# Patient Record
Sex: FEMALE | Race: BLACK OR AFRICAN AMERICAN | Employment: OTHER | URBAN - NONMETROPOLITAN AREA
[De-identification: names, ages, dates, MRNs, and addresses within clinical notes are randomized per-mention and may not be internally consistent; named-entity substitution may affect disease eponyms.]

---

## 2017-05-02 ENCOUNTER — HOSPITAL ENCOUNTER (INPATIENT)
Age: 42
LOS: 1 days | Discharge: PSYCHIATRIC OR SUBSTANCE USE FACILITY | DRG: 885 | End: 2017-05-03
Attending: EMERGENCY MEDICINE | Admitting: PSYCHIATRY & NEUROLOGY
Payer: MEDICAID

## 2017-05-02 DIAGNOSIS — D64.9 ANEMIA, UNSPECIFIED TYPE: ICD-10-CM

## 2017-05-02 DIAGNOSIS — F20.9 SCHIZOPHRENIA, UNSPECIFIED TYPE (HCC): Primary | ICD-10-CM

## 2017-05-02 DIAGNOSIS — E61.1 IRON DEFICIENCY: ICD-10-CM

## 2017-05-02 LAB
ALBUMIN SERPL-MCNC: 4.2 G/DL (ref 3.5–5.2)
ALP BLD-CCNC: 72 U/L (ref 35–104)
ALT SERPL-CCNC: 7 U/L (ref 5–33)
AMPHETAMINE SCREEN, URINE: NEGATIVE
ANION GAP SERPL CALCULATED.3IONS-SCNC: 13 MMOL/L (ref 7–19)
AST SERPL-CCNC: 8 U/L (ref 5–32)
BARBITURATE SCREEN URINE: NEGATIVE
BASOPHILS ABSOLUTE: 0 K/UL (ref 0–0.2)
BASOPHILS RELATIVE PERCENT: 0.3 % (ref 0–1)
BENZODIAZEPINE SCREEN, URINE: NEGATIVE
BILIRUB SERPL-MCNC: <0.2 MG/DL (ref 0.2–1.2)
BILIRUBIN URINE: NEGATIVE
BLOOD, URINE: NEGATIVE
BUN BLDV-MCNC: 6 MG/DL (ref 6–20)
CALCIUM SERPL-MCNC: 8.9 MG/DL (ref 8.6–10)
CANNABINOID SCREEN URINE: NEGATIVE
CHLORIDE BLD-SCNC: 101 MMOL/L (ref 98–111)
CLARITY: CLEAR
CO2: 22 MMOL/L (ref 22–29)
COCAINE METABOLITE SCREEN URINE: NEGATIVE
COLOR: YELLOW
CREAT SERPL-MCNC: 0.5 MG/DL (ref 0.5–0.9)
EOSINOPHILS ABSOLUTE: 0 K/UL (ref 0–0.6)
EOSINOPHILS RELATIVE PERCENT: 0.2 % (ref 0–5)
ETHANOL: <10 MG/DL (ref 0–0.08)
FERRITIN: 7.1 NG/ML (ref 13–150)
GFR NON-AFRICAN AMERICAN: >60
GLOBULIN: 3 G/DL
GLUCOSE BLD-MCNC: 87 MG/DL (ref 74–109)
GLUCOSE URINE: NEGATIVE MG/DL
HCG(URINE) PREGNANCY TEST: NEGATIVE
HCT VFR BLD CALC: 29.3 % (ref 37–47)
HEMOGLOBIN: 8.6 G/DL (ref 12–16)
IRON SATURATION: 6 % (ref 14–50)
IRON: 25 UG/DL (ref 37–145)
KETONES, URINE: NEGATIVE MG/DL
LEUKOCYTE ESTERASE, URINE: NEGATIVE
LYMPHOCYTES ABSOLUTE: 1.7 K/UL (ref 1.1–4.5)
LYMPHOCYTES RELATIVE PERCENT: 19.3 % (ref 20–40)
Lab: NORMAL
MCH RBC QN AUTO: 19.2 PG (ref 27–31)
MCHC RBC AUTO-ENTMCNC: 29.4 G/DL (ref 33–37)
MCV RBC AUTO: 65.5 FL (ref 81–99)
MONOCYTES ABSOLUTE: 0.8 K/UL (ref 0–0.9)
MONOCYTES RELATIVE PERCENT: 8.9 % (ref 0–10)
NEUTROPHILS ABSOLUTE: 6.3 K/UL (ref 1.5–7.5)
NEUTROPHILS RELATIVE PERCENT: 70.4 % (ref 50–65)
NITRITE, URINE: NEGATIVE
OPIATE SCREEN URINE: NEGATIVE
PDW BLD-RTO: 21.4 % (ref 11.5–14.5)
PH UA: 7.5
PLATELET # BLD: 363 K/UL (ref 130–400)
PMV BLD AUTO: 10.2 FL (ref 7.4–10.4)
POTASSIUM SERPL-SCNC: 3.6 MMOL/L (ref 3.5–5)
PROTEIN UA: NEGATIVE MG/DL
RBC # BLD: 4.47 M/UL (ref 4.2–5.4)
SODIUM BLD-SCNC: 136 MMOL/L (ref 136–145)
SPECIFIC GRAVITY UA: 1
TOTAL IRON BINDING CAPACITY: 397 UG/DL (ref 250–400)
TOTAL PROTEIN: 7.2 G/DL (ref 6.6–8.7)
UROBILINOGEN, URINE: 0.2 E.U./DL
WBC # BLD: 9 K/UL (ref 4.8–10.8)

## 2017-05-02 PROCEDURE — 82728 ASSAY OF FERRITIN: CPT

## 2017-05-02 PROCEDURE — 6370000000 HC RX 637 (ALT 250 FOR IP): Performed by: PSYCHIATRY & NEUROLOGY

## 2017-05-02 PROCEDURE — 99285 EMERGENCY DEPT VISIT HI MDM: CPT

## 2017-05-02 PROCEDURE — 80053 COMPREHEN METABOLIC PANEL: CPT

## 2017-05-02 PROCEDURE — G0480 DRUG TEST DEF 1-7 CLASSES: HCPCS

## 2017-05-02 PROCEDURE — 83540 ASSAY OF IRON: CPT

## 2017-05-02 PROCEDURE — 36415 COLL VENOUS BLD VENIPUNCTURE: CPT

## 2017-05-02 PROCEDURE — 81025 URINE PREGNANCY TEST: CPT

## 2017-05-02 PROCEDURE — 99284 EMERGENCY DEPT VISIT MOD MDM: CPT | Performed by: EMERGENCY MEDICINE

## 2017-05-02 PROCEDURE — 80307 DRUG TEST PRSMV CHEM ANLYZR: CPT

## 2017-05-02 PROCEDURE — 81003 URINALYSIS AUTO W/O SCOPE: CPT

## 2017-05-02 PROCEDURE — 1240000000 HC EMOTIONAL WELLNESS R&B

## 2017-05-02 PROCEDURE — 85025 COMPLETE CBC W/AUTO DIFF WBC: CPT

## 2017-05-02 PROCEDURE — 83550 IRON BINDING TEST: CPT

## 2017-05-02 PROCEDURE — 6370000000 HC RX 637 (ALT 250 FOR IP): Performed by: EMERGENCY MEDICINE

## 2017-05-02 RX ORDER — CITALOPRAM 10 MG/1
10 TABLET ORAL DAILY
Status: DISCONTINUED | OUTPATIENT
Start: 2017-05-02 | End: 2017-05-02

## 2017-05-02 RX ORDER — BENZTROPINE MESYLATE 1 MG/1
1 TABLET ORAL 2 TIMES DAILY
Status: DISCONTINUED | OUTPATIENT
Start: 2017-05-02 | End: 2017-05-04 | Stop reason: HOSPADM

## 2017-05-02 RX ORDER — PALIPERIDONE 6 MG/1
6 TABLET, EXTENDED RELEASE ORAL DAILY
Status: DISCONTINUED | OUTPATIENT
Start: 2017-05-02 | End: 2017-05-02

## 2017-05-02 RX ORDER — NICOTINE 21 MG/24HR
1 PATCH, TRANSDERMAL 24 HOURS TRANSDERMAL DAILY
Status: DISCONTINUED | OUTPATIENT
Start: 2017-05-03 | End: 2017-05-04 | Stop reason: HOSPADM

## 2017-05-02 RX ORDER — PALIPERIDONE 6 MG/1
6 TABLET, EXTENDED RELEASE ORAL EVERY MORNING
Status: ON HOLD | COMMUNITY
End: 2017-05-03 | Stop reason: HOSPADM

## 2017-05-02 RX ORDER — PALIPERIDONE 6 MG/1
6 TABLET, EXTENDED RELEASE ORAL DAILY
Status: DISCONTINUED | OUTPATIENT
Start: 2017-05-03 | End: 2017-05-03

## 2017-05-02 RX ORDER — CITALOPRAM 10 MG/1
10 TABLET ORAL DAILY
Status: DISCONTINUED | OUTPATIENT
Start: 2017-05-03 | End: 2017-05-04 | Stop reason: HOSPADM

## 2017-05-02 RX ORDER — QUETIAPINE FUMARATE 400 MG/1
400 TABLET, FILM COATED ORAL NIGHTLY
Status: DISCONTINUED | OUTPATIENT
Start: 2017-05-02 | End: 2017-05-03

## 2017-05-02 RX ORDER — NICOTINE 21 MG/24HR
1 PATCH, TRANSDERMAL 24 HOURS TRANSDERMAL DAILY
Status: DISCONTINUED | OUTPATIENT
Start: 2017-05-02 | End: 2017-05-02

## 2017-05-02 RX ORDER — OLANZAPINE 10 MG/1
10 TABLET, ORALLY DISINTEGRATING ORAL ONCE
Status: COMPLETED | OUTPATIENT
Start: 2017-05-02 | End: 2017-05-02

## 2017-05-02 RX ORDER — TRAZODONE HYDROCHLORIDE 50 MG/1
50 TABLET ORAL NIGHTLY PRN
Status: DISCONTINUED | OUTPATIENT
Start: 2017-05-02 | End: 2017-05-03

## 2017-05-02 RX ORDER — ACETAMINOPHEN 325 MG/1
650 TABLET ORAL EVERY 4 HOURS PRN
Status: DISCONTINUED | OUTPATIENT
Start: 2017-05-02 | End: 2017-05-04 | Stop reason: HOSPADM

## 2017-05-02 RX ADMIN — BENZTROPINE MESYLATE 1 MG: 1 TABLET ORAL at 21:08

## 2017-05-02 RX ADMIN — OLANZAPINE 10 MG: 10 TABLET, ORALLY DISINTEGRATING ORAL at 16:35

## 2017-05-02 RX ADMIN — QUETIAPINE FUMARATE 400 MG: 400 TABLET, FILM COATED ORAL at 21:08

## 2017-05-02 RX ADMIN — TRAZODONE HYDROCHLORIDE 50 MG: 50 TABLET ORAL at 21:08

## 2017-05-02 ASSESSMENT — SLEEP AND FATIGUE QUESTIONNAIRES
AVERAGE NUMBER OF SLEEP HOURS: 20
DO YOU HAVE DIFFICULTY SLEEPING: NO
DO YOU USE A SLEEP AID: NO

## 2017-05-02 ASSESSMENT — LIFESTYLE VARIABLES: HISTORY_ALCOHOL_USE: NO

## 2017-05-02 ASSESSMENT — PATIENT HEALTH QUESTIONNAIRE - PHQ9: SUM OF ALL RESPONSES TO PHQ QUESTIONS 1-9: 4

## 2017-05-03 VITALS
OXYGEN SATURATION: 98 % | WEIGHT: 175 LBS | SYSTOLIC BLOOD PRESSURE: 126 MMHG | HEART RATE: 93 BPM | DIASTOLIC BLOOD PRESSURE: 63 MMHG | TEMPERATURE: 98.4 F | RESPIRATION RATE: 20 BRPM | HEIGHT: 59 IN | BODY MASS INDEX: 35.28 KG/M2

## 2017-05-03 PROCEDURE — 6370000000 HC RX 637 (ALT 250 FOR IP): Performed by: PSYCHIATRY & NEUROLOGY

## 2017-05-03 PROCEDURE — 93005 ELECTROCARDIOGRAM TRACING: CPT

## 2017-05-03 PROCEDURE — 90791 PSYCH DIAGNOSTIC EVALUATION: CPT | Performed by: PSYCHIATRY & NEUROLOGY

## 2017-05-03 RX ORDER — TRAZODONE HYDROCHLORIDE 100 MG/1
100 TABLET ORAL NIGHTLY PRN
Status: DISCONTINUED | OUTPATIENT
Start: 2017-05-03 | End: 2017-05-04 | Stop reason: HOSPADM

## 2017-05-03 RX ORDER — OLANZAPINE 10 MG/1
10 TABLET, ORALLY DISINTEGRATING ORAL ONCE
Status: COMPLETED | OUTPATIENT
Start: 2017-05-03 | End: 2017-05-03

## 2017-05-03 RX ORDER — OLANZAPINE 5 MG/1
5 TABLET ORAL 3 TIMES DAILY
Status: DISCONTINUED | OUTPATIENT
Start: 2017-05-03 | End: 2017-05-04 | Stop reason: HOSPADM

## 2017-05-03 RX ORDER — LORAZEPAM 1 MG/1
2 TABLET ORAL 4 TIMES DAILY
Status: DISCONTINUED | OUTPATIENT
Start: 2017-05-03 | End: 2017-05-04 | Stop reason: HOSPADM

## 2017-05-03 RX ADMIN — BENZTROPINE MESYLATE 1 MG: 1 TABLET ORAL at 21:00

## 2017-05-03 RX ADMIN — CITALOPRAM HYDROBROMIDE 10 MG: 10 TABLET ORAL at 08:57

## 2017-05-03 RX ADMIN — BENZTROPINE MESYLATE 1 MG: 1 TABLET ORAL at 08:57

## 2017-05-03 RX ADMIN — LORAZEPAM 2 MG: 1 TABLET ORAL at 17:10

## 2017-05-03 RX ADMIN — OLANZAPINE 5 MG: 5 TABLET, FILM COATED ORAL at 21:00

## 2017-05-03 RX ADMIN — OLANZAPINE 10 MG: 10 TABLET, ORALLY DISINTEGRATING ORAL at 18:02

## 2017-05-03 RX ADMIN — OLANZAPINE 5 MG: 5 TABLET, FILM COATED ORAL at 14:48

## 2017-05-03 RX ADMIN — LORAZEPAM 2 MG: 1 TABLET ORAL at 21:00

## 2017-05-03 RX ADMIN — PALIPERIDONE 6 MG: 6 TABLET, EXTENDED RELEASE ORAL at 08:57

## 2017-05-03 RX ADMIN — OLANZAPINE 10 MG: 10 TABLET, ORALLY DISINTEGRATING ORAL at 17:40

## 2017-05-05 LAB
EKG P AXIS: 73 DEGREES
EKG P-R INTERVAL: 152 MS
EKG Q-T INTERVAL: 348 MS
EKG QRS DURATION: 82 MS
EKG QTC CALCULATION (BAZETT): 398 MS
EKG T AXIS: 39 DEGREES

## 2017-08-08 ENCOUNTER — OFFICE VISIT (OUTPATIENT)
Dept: PRIMARY CARE CLINIC | Age: 42
End: 2017-08-08
Payer: MEDICAID

## 2017-08-08 VITALS
BODY MASS INDEX: 33.47 KG/M2 | OXYGEN SATURATION: 99 % | HEART RATE: 86 BPM | WEIGHT: 166 LBS | SYSTOLIC BLOOD PRESSURE: 120 MMHG | TEMPERATURE: 98 F | DIASTOLIC BLOOD PRESSURE: 80 MMHG | HEIGHT: 59 IN

## 2017-08-08 DIAGNOSIS — E61.1 IRON DEFICIENCY: ICD-10-CM

## 2017-08-08 DIAGNOSIS — E55.9 VITAMIN D DEFICIENCY: ICD-10-CM

## 2017-08-08 DIAGNOSIS — E53.9 VITAMIN B DEFICIENCY: ICD-10-CM

## 2017-08-08 DIAGNOSIS — Z71.6 TOBACCO ABUSE COUNSELING: ICD-10-CM

## 2017-08-08 DIAGNOSIS — Z72.0 TOBACCO ABUSE: ICD-10-CM

## 2017-08-08 DIAGNOSIS — F20.89 OTHER SCHIZOPHRENIA (HCC): Primary | ICD-10-CM

## 2017-08-08 LAB
HCT VFR BLD CALC: 43.5 % (ref 37–47)
HEMOGLOBIN: 13.4 G/DL (ref 12–16)
MCH RBC QN AUTO: 27.4 PG (ref 27–31)
MCHC RBC AUTO-ENTMCNC: 30.8 G/DL (ref 33–37)
MCV RBC AUTO: 89 FL (ref 81–99)
PDW BLD-RTO: 25.2 % (ref 11.5–14.5)
PLATELET # BLD: 249 K/UL (ref 130–400)
PMV BLD AUTO: 11.1 FL (ref 9.4–12.3)
RBC # BLD: 4.89 M/UL (ref 4.2–5.4)
VITAMIN B-12: >2000 PG/ML (ref 211–946)
VITAMIN D 25-HYDROXY: 19.2 NG/ML
WBC # BLD: 5.9 K/UL (ref 4.8–10.8)

## 2017-08-08 PROCEDURE — 99406 BEHAV CHNG SMOKING 3-10 MIN: CPT | Performed by: NURSE PRACTITIONER

## 2017-08-08 PROCEDURE — 99214 OFFICE O/P EST MOD 30 MIN: CPT | Performed by: NURSE PRACTITIONER

## 2017-08-08 RX ORDER — DOCUSATE SODIUM 100 MG/1
100 CAPSULE, LIQUID FILLED ORAL 2 TIMES DAILY
COMMUNITY
End: 2017-08-08 | Stop reason: SDUPTHER

## 2017-08-08 RX ORDER — MULTIVIT WITH MINERALS/LUTEIN
250 TABLET ORAL DAILY
Qty: 30 TABLET | Refills: 5 | Status: SHIPPED | OUTPATIENT
Start: 2017-08-08 | End: 2018-09-25

## 2017-08-08 RX ORDER — DOCUSATE SODIUM 100 MG/1
100 CAPSULE, LIQUID FILLED ORAL 2 TIMES DAILY
Qty: 60 CAPSULE | Refills: 3 | Status: SHIPPED | OUTPATIENT
Start: 2017-08-08 | End: 2017-12-08 | Stop reason: SDUPTHER

## 2017-08-08 RX ORDER — OLANZAPINE 10 MG/1
TABLET ORAL
Refills: 3 | Status: ON HOLD | COMMUNITY
Start: 2017-08-01 | End: 2021-09-24 | Stop reason: HOSPADM

## 2017-08-08 RX ORDER — CYANOCOBALAMIN 1000 UG/ML
1000 INJECTION INTRAMUSCULAR; SUBCUTANEOUS ONCE
Status: COMPLETED | OUTPATIENT
Start: 2017-08-08 | End: 2017-08-08

## 2017-08-08 RX ORDER — MULTIVIT WITH MINERALS/LUTEIN
250 TABLET ORAL DAILY
COMMUNITY
End: 2017-08-08 | Stop reason: SDUPTHER

## 2017-08-08 RX ORDER — DOXYCYCLINE HYCLATE 50 MG/1
324 CAPSULE, GELATIN COATED ORAL
COMMUNITY
End: 2017-08-08 | Stop reason: SDUPTHER

## 2017-08-08 RX ORDER — RISPERIDONE 3 MG/1
TABLET, FILM COATED ORAL
Refills: 3 | COMMUNITY
Start: 2017-08-01 | End: 2021-04-16

## 2017-08-08 RX ORDER — DIVALPROEX SODIUM 500 MG/1
TABLET, DELAYED RELEASE ORAL
Refills: 3 | COMMUNITY
Start: 2017-08-01 | End: 2019-03-29 | Stop reason: ALTCHOICE

## 2017-08-08 RX ORDER — DOXYCYCLINE HYCLATE 50 MG/1
324 CAPSULE, GELATIN COATED ORAL
Qty: 30 TABLET | Refills: 5 | Status: SHIPPED | OUTPATIENT
Start: 2017-08-08 | End: 2019-03-29 | Stop reason: ALTCHOICE

## 2017-08-08 RX ADMIN — CYANOCOBALAMIN 1000 MCG: 1000 INJECTION INTRAMUSCULAR; SUBCUTANEOUS at 14:23

## 2017-08-08 ASSESSMENT — ENCOUNTER SYMPTOMS
GASTROINTESTINAL NEGATIVE: 1
EYES NEGATIVE: 1
RESPIRATORY NEGATIVE: 1

## 2017-08-09 ENCOUNTER — TELEPHONE (OUTPATIENT)
Dept: PRIMARY CARE CLINIC | Age: 42
End: 2017-08-09

## 2017-12-08 RX ORDER — DOCUSATE SODIUM 100 MG/1
100 CAPSULE, LIQUID FILLED ORAL 2 TIMES DAILY
Qty: 60 CAPSULE | Refills: 3 | Status: SHIPPED | OUTPATIENT
Start: 2017-12-08 | End: 2018-03-23 | Stop reason: SDUPTHER

## 2018-03-23 ENCOUNTER — OFFICE VISIT (OUTPATIENT)
Dept: PRIMARY CARE CLINIC | Age: 43
End: 2018-03-23
Payer: MEDICAID

## 2018-03-23 VITALS
BODY MASS INDEX: 40.92 KG/M2 | SYSTOLIC BLOOD PRESSURE: 126 MMHG | DIASTOLIC BLOOD PRESSURE: 84 MMHG | TEMPERATURE: 98.6 F | HEIGHT: 59 IN | WEIGHT: 203 LBS | OXYGEN SATURATION: 94 % | HEART RATE: 100 BPM

## 2018-03-23 DIAGNOSIS — Z71.6 TOBACCO ABUSE COUNSELING: ICD-10-CM

## 2018-03-23 DIAGNOSIS — J44.9 STAGE 2 MODERATE COPD BY GOLD CLASSIFICATION (HCC): ICD-10-CM

## 2018-03-23 DIAGNOSIS — Z72.89 OTHER PROBLEMS RELATED TO LIFESTYLE: ICD-10-CM

## 2018-03-23 DIAGNOSIS — Z13.220 SCREENING FOR HYPERLIPIDEMIA: ICD-10-CM

## 2018-03-23 DIAGNOSIS — Z13.1 DIABETES MELLITUS SCREENING: ICD-10-CM

## 2018-03-23 DIAGNOSIS — Z72.0 TOBACCO ABUSE: ICD-10-CM

## 2018-03-23 DIAGNOSIS — Z23 NEED FOR PROPHYLACTIC VACCINATION AGAINST STREPTOCOCCUS PNEUMONIAE (PNEUMOCOCCUS): ICD-10-CM

## 2018-03-23 DIAGNOSIS — F20.89 OTHER SCHIZOPHRENIA (HCC): Primary | ICD-10-CM

## 2018-03-23 LAB
CHOLESTEROL, TOTAL: 184 MG/DL (ref 160–199)
HBA1C MFR BLD: 6.8 %
HDLC SERPL-MCNC: 30 MG/DL (ref 65–121)
LDL CHOLESTEROL CALCULATED: 106 MG/DL
RAPID HIV 1&2: NORMAL
TRIGL SERPL-MCNC: 241 MG/DL (ref 0–149)
VALPROIC ACID LEVEL: 115.5 UG/ML (ref 50–100)

## 2018-03-23 PROCEDURE — 94010 BREATHING CAPACITY TEST: CPT | Performed by: NURSE PRACTITIONER

## 2018-03-23 PROCEDURE — 99406 BEHAV CHNG SMOKING 3-10 MIN: CPT | Performed by: NURSE PRACTITIONER

## 2018-03-23 PROCEDURE — 99214 OFFICE O/P EST MOD 30 MIN: CPT | Performed by: NURSE PRACTITIONER

## 2018-03-23 RX ORDER — ALBUTEROL SULFATE 90 UG/1
2 AEROSOL, METERED RESPIRATORY (INHALATION) EVERY 6 HOURS PRN
Qty: 1 INHALER | Refills: 3 | Status: SHIPPED | OUTPATIENT
Start: 2018-03-23 | End: 2019-03-29 | Stop reason: SDUPTHER

## 2018-03-23 RX ORDER — DOCUSATE SODIUM 100 MG/1
100 CAPSULE, LIQUID FILLED ORAL 2 TIMES DAILY
Qty: 60 CAPSULE | Refills: 3 | Status: SHIPPED | OUTPATIENT
Start: 2018-03-23 | End: 2018-09-25 | Stop reason: SDUPTHER

## 2018-03-23 ASSESSMENT — PATIENT HEALTH QUESTIONNAIRE - PHQ9
SUM OF ALL RESPONSES TO PHQ9 QUESTIONS 1 & 2: 2
SUM OF ALL RESPONSES TO PHQ QUESTIONS 1-9: 2
1. LITTLE INTEREST OR PLEASURE IN DOING THINGS: 1
2. FEELING DOWN, DEPRESSED OR HOPELESS: 1

## 2018-03-23 ASSESSMENT — ENCOUNTER SYMPTOMS
GASTROINTESTINAL NEGATIVE: 1
EYES NEGATIVE: 1
WHEEZING: 1

## 2018-03-27 PROCEDURE — 90471 IMMUNIZATION ADMIN: CPT | Performed by: NURSE PRACTITIONER

## 2018-03-27 PROCEDURE — 90732 PPSV23 VACC 2 YRS+ SUBQ/IM: CPT | Performed by: NURSE PRACTITIONER

## 2018-03-27 NOTE — PROGRESS NOTES
After obtaining consent, and per orders of Annette, injection of PCV 23 given in Left arm by Mendez Pace. Patient instructed to remain in clinic for 20 minutes afterwards, and to report any adverse reaction to me immediately.
Judgment and thought content normal. Her mood appears not anxious. Her speech is delayed. She is slowed and withdrawn. Cognition and memory are normal. She does not exhibit a depressed mood. Nursing note and vitals reviewed. /84   Pulse 100   Temp 98.6 °F (37 °C)   Ht 4' 11\" (1.499 m)   Wt 203 lb (92.1 kg)   SpO2 94%   BMI 41.00 kg/m²     Assessment:     1. Other [facility] ([facility]     2. Tobacco abuse     3. Tobacco abuse counseling     4. Need for prophylactic vaccination against Streptococcus pneumoniae (pneumococcus)  Pneumococcal polysaccharide vaccine 23-valent PPSV23   5. Diabetes mellitus screening  Hemoglobin A1c   6. Other problems related to lifestyle  HIV Rapid 1&2   7. Screening for hyperlipidemia  Lipid Panel   8. Stage 2 moderate COPD by GOLD classification ([facility].)  99898 - [place] BREATHING CAPACITY TEST       No results found for this visit on 03/23/18. Plan:     Patient to use albuterol inhaler as needed/directed. Pneumonia vaccine in office today. Order for labs provided - we will call patient with these results. Patient's sister reports that patient has never had PAP and does not want PAP in the future. Approximately 5 minutes of education was provided about quit smoking and the harms of tobacco.  Patient does show understanding. Patient has  the desire to quit smoking in the future. She is to speak with Dr Giovanni Baron about adding Wellbutrin to regimen for cessation. If he is ok with adding will plan to call into pharmacy. IN OFFICE SPIROMETRY (86474)  In office from a chair was performed today. Predicted FEV1/FVC % is 76  Percent predicted FEV1 is 57 %.   Interpretation is stage 2 disease.   -Normal: FEV1/FVC > 0.8, pred FEV1>80%   -Stage 1: FEV1/FVC <0.7, pred FEV1>80%   -Stage 2: FEV1/FVC <0.7, pred FEV1<80%   -Stage 3: FEV1/FVC <0.7, pred FEV1<50%   -Stage 4: FEV1/FVC <0.7, pred FEV1<50% + severe disease/chronic failure    Return in about 6 months (around

## 2018-03-28 ENCOUNTER — TELEPHONE (OUTPATIENT)
Dept: PRIMARY CARE CLINIC | Age: 43
End: 2018-03-28

## 2018-03-28 NOTE — TELEPHONE ENCOUNTER
This LPN contacted patient mother to inform them of lab results. Patient mother stated understanding.

## 2018-09-25 ENCOUNTER — OFFICE VISIT (OUTPATIENT)
Dept: PRIMARY CARE CLINIC | Age: 43
End: 2018-09-25
Payer: MEDICAID

## 2018-09-25 VITALS
TEMPERATURE: 98 F | HEART RATE: 90 BPM | BODY MASS INDEX: 39.68 KG/M2 | DIASTOLIC BLOOD PRESSURE: 86 MMHG | WEIGHT: 196.8 LBS | SYSTOLIC BLOOD PRESSURE: 122 MMHG | OXYGEN SATURATION: 96 % | HEIGHT: 59 IN

## 2018-09-25 DIAGNOSIS — R73.03 PREDIABETES: ICD-10-CM

## 2018-09-25 DIAGNOSIS — J44.9 STAGE 2 MODERATE COPD BY GOLD CLASSIFICATION (HCC): ICD-10-CM

## 2018-09-25 DIAGNOSIS — D50.8 IRON DEFICIENCY ANEMIA SECONDARY TO INADEQUATE DIETARY IRON INTAKE: ICD-10-CM

## 2018-09-25 DIAGNOSIS — F17.210 CIGARETTE NICOTINE DEPENDENCE WITHOUT COMPLICATION: ICD-10-CM

## 2018-09-25 DIAGNOSIS — Z71.6 TOBACCO ABUSE COUNSELING: ICD-10-CM

## 2018-09-25 DIAGNOSIS — F20.89 OTHER SCHIZOPHRENIA (HCC): Primary | ICD-10-CM

## 2018-09-25 DIAGNOSIS — E55.9 VITAMIN D DEFICIENCY: ICD-10-CM

## 2018-09-25 LAB
HBA1C MFR BLD: 6 % (ref 4–6)
HCT VFR BLD CALC: 41.6 % (ref 37–47)
HEMOGLOBIN: 13.5 G/DL (ref 12–16)
IRON SATURATION: 17 % (ref 14–50)
IRON: 65 UG/DL (ref 37–145)
MCH RBC QN AUTO: 29.5 PG (ref 27–31)
MCHC RBC AUTO-ENTMCNC: 32.5 G/DL (ref 33–37)
MCV RBC AUTO: 91 FL (ref 81–99)
PDW BLD-RTO: 15.6 % (ref 11.5–14.5)
PLATELET # BLD: 232 K/UL (ref 130–400)
PMV BLD AUTO: 11.3 FL (ref 9.4–12.3)
RBC # BLD: 4.57 M/UL (ref 4.2–5.4)
TOTAL IRON BINDING CAPACITY: 376 UG/DL (ref 250–400)
WBC # BLD: 6.3 K/UL (ref 4.8–10.8)

## 2018-09-25 PROCEDURE — 99406 BEHAV CHNG SMOKING 3-10 MIN: CPT | Performed by: NURSE PRACTITIONER

## 2018-09-25 PROCEDURE — 99214 OFFICE O/P EST MOD 30 MIN: CPT | Performed by: NURSE PRACTITIONER

## 2018-09-25 RX ORDER — DOCUSATE SODIUM 100 MG/1
100 CAPSULE, LIQUID FILLED ORAL 2 TIMES DAILY
Qty: 60 CAPSULE | Refills: 3 | Status: SHIPPED | OUTPATIENT
Start: 2018-09-25 | End: 2019-04-09 | Stop reason: SDUPTHER

## 2018-09-25 RX ORDER — ERGOCALCIFEROL 1.25 MG/1
50000 CAPSULE ORAL WEEKLY
Qty: 4 CAPSULE | Refills: 5 | Status: SHIPPED | OUTPATIENT
Start: 2018-09-25 | End: 2019-05-15 | Stop reason: SDUPTHER

## 2018-09-25 ASSESSMENT — ENCOUNTER SYMPTOMS
RESPIRATORY NEGATIVE: 1
GASTROINTESTINAL NEGATIVE: 1
EYES NEGATIVE: 1

## 2018-09-25 NOTE — PROGRESS NOTES
tablet 5     No current facility-administered medications for this visit. No Known Allergies    Family History   Problem Relation Age of Onset    Heart Disease Mother            Subjective:      Review of Systems   Constitutional: Positive for appetite change and unexpected weight change (weight gain). HENT: Negative. Eyes: Negative. Respiratory: Negative. Cardiovascular: Negative. Gastrointestinal: Negative. Endocrine: Negative. Genitourinary: Negative. Musculoskeletal: Negative. Skin: Negative. Neurological: Negative. Hematological: Negative. Psychiatric/Behavioral: The patient is nervous/anxious. Objective:     Physical Exam   Constitutional: She is oriented to person, place, and time. Vital signs are normal. She appears well-developed and well-nourished. HENT:   Head: Normocephalic and atraumatic. Right Ear: Hearing, tympanic membrane, external ear and ear canal normal.   Left Ear: Hearing, tympanic membrane, external ear and ear canal normal.   Nose: Nose normal.   Mouth/Throat: Uvula is midline, oropharynx is clear and moist and mucous membranes are normal.   Eyes: Pupils are equal, round, and reactive to light. Conjunctivae, EOM and lids are normal. No scleral icterus. Neck: Trachea normal and normal range of motion. Neck supple. No thyroid mass and no thyromegaly present. Cardiovascular: Normal rate, regular rhythm, normal heart sounds and normal pulses. Pulmonary/Chest: Effort normal. She has no decreased breath sounds. She has no wheezes. She has no rhonchi. She has no rales. Abdominal: Soft. Normal appearance and bowel sounds are normal.   Musculoskeletal: Normal range of motion. Cervical back: Normal. She exhibits normal range of motion and no tenderness. Thoracic back: Normal. She exhibits normal range of motion and no tenderness. Lumbar back: Normal. She exhibits normal range of motion and no tenderness.    Neurological: She is alert and oriented to person, place, and time. She has normal strength. Skin: Skin is warm, dry and intact. Psychiatric: She has a normal mood and affect. Judgment and thought content normal. Her mood appears not anxious. Her speech is delayed. She is slowed and withdrawn. Cognition and memory are normal. She does not exhibit a depressed mood. Nursing note and vitals reviewed. /86   Pulse 90   Temp 98 °F (36.7 °C)   Ht 4' 11\" (1.499 m)   Wt 196 lb 12.8 oz (89.3 kg)   SpO2 96%   BMI 39.75 kg/m²     Assessment:      Diagnosis Orders   1. Other schizophrenia ([facility]     2. Cigarette nicotine dependence without complication     3. Tobacco abuse counseling     4. Stage 2 moderate COPD by GOLD classification ([facility]     5. Vitamin D deficiency  vitamin D (ERGOCALCIFEROL) 56751 units CAPS capsule   6. Prediabetes  Hemoglobin A1C   7. Iron deficiency anemia secondary to inadequate dietary iron intake  CBC    Iron and TIBC       No results found for this visit on 09/25/18. Plan:     I am checking labs - we will call with results. patient is to restart vit d replacement. Approximately 5 minutes of education was provided about quit smoking and the harms of tobacco.  Patient does show understanding. Patient does not have the desire to quit smoking in the future. We did discuss Nicoderm gum, but not wanting it at this time. If patient develops increased shortness of breath, wheezing, or cough we may need to start inhaler as discussed for COPD. Patient and sister do verbalized understanding of this. Return in about 6 months (around 3/25/2019) for Follow up chronic condititons.     Orders Placed This Encounter   Procedures    Hemoglobin A1C     Standing Status:   Future     Standing Expiration Date:   9/25/2019    CBC     Standing Status:   Future     Standing Expiration Date:   9/25/2019    Iron and TIBC     Standing Status:   Future     Standing Expiration Date:   9/25/2019     Order Specific Question:   Is Patient Fasting? Answer:   no     Comments:   0     Order Specific Question:   No of Hours? Answer:   0       Orders Placed This Encounter   Medications    vitamin D (ERGOCALCIFEROL) 78637 units CAPS capsule     Sig: Take 1 capsule by mouth once a week Take after a meal     Dispense:  4 capsule     Refill:  5    docusate sodium (COLACE) 100 MG capsule     Sig: Take 1 capsule by mouth 2 times daily     Dispense:  60 capsule     Refill:  3        Patient given educational materials - see patient instructions. Discussed use, benefit, and side effects of prescribed medications. All patient questions answered. Pt voiced understanding. Reviewed health maintenance. Instructed to continue current medications, diet and exercise. Patient agreed with treatment plan. Follow up as directed.            Electronically signed by BARBARA Orantes on 9/25/2018 at 1:40 PM

## 2018-09-27 ENCOUNTER — TELEPHONE (OUTPATIENT)
Dept: PRIMARY CARE CLINIC | Age: 43
End: 2018-09-27

## 2018-09-27 NOTE — TELEPHONE ENCOUNTER
----- Message from BARBARA Schumacher sent at 9/26/2018  4:46 PM CDT -----  Please let patient know that lab results were normal.  Thanks!

## 2019-03-29 ENCOUNTER — OFFICE VISIT (OUTPATIENT)
Dept: PRIMARY CARE CLINIC | Age: 44
End: 2019-03-29
Payer: MEDICAID

## 2019-03-29 VITALS
HEIGHT: 59 IN | TEMPERATURE: 98.9 F | WEIGHT: 163 LBS | SYSTOLIC BLOOD PRESSURE: 110 MMHG | HEART RATE: 93 BPM | OXYGEN SATURATION: 98 % | BODY MASS INDEX: 32.86 KG/M2 | DIASTOLIC BLOOD PRESSURE: 60 MMHG

## 2019-03-29 DIAGNOSIS — F17.210 CIGARETTE NICOTINE DEPENDENCE WITHOUT COMPLICATION: ICD-10-CM

## 2019-03-29 DIAGNOSIS — F20.89 OTHER SCHIZOPHRENIA (HCC): Primary | ICD-10-CM

## 2019-03-29 DIAGNOSIS — Z71.6 TOBACCO ABUSE COUNSELING: ICD-10-CM

## 2019-03-29 DIAGNOSIS — J44.9 STAGE 2 MODERATE COPD BY GOLD CLASSIFICATION (HCC): ICD-10-CM

## 2019-03-29 PROCEDURE — 99406 BEHAV CHNG SMOKING 3-10 MIN: CPT | Performed by: NURSE PRACTITIONER

## 2019-03-29 PROCEDURE — 99214 OFFICE O/P EST MOD 30 MIN: CPT | Performed by: NURSE PRACTITIONER

## 2019-03-29 RX ORDER — LORATADINE 10 MG/1
10 TABLET ORAL DAILY
Qty: 30 TABLET | Refills: 5 | Status: SHIPPED | OUTPATIENT
Start: 2019-03-29 | End: 2019-10-22 | Stop reason: SDUPTHER

## 2019-03-29 RX ORDER — DOXEPIN HYDROCHLORIDE 10 MG/1
CAPSULE ORAL
Refills: 3 | COMMUNITY
Start: 2019-03-07 | End: 2021-04-16

## 2019-03-29 RX ORDER — ALBUTEROL SULFATE 90 UG/1
2 AEROSOL, METERED RESPIRATORY (INHALATION) EVERY 6 HOURS PRN
Qty: 1 INHALER | Refills: 3 | Status: SHIPPED | OUTPATIENT
Start: 2019-03-29 | End: 2020-05-22 | Stop reason: SDUPTHER

## 2019-03-29 RX ORDER — OXCARBAZEPINE 150 MG/1
TABLET, FILM COATED ORAL
Refills: 3 | COMMUNITY
Start: 2019-03-01 | End: 2021-04-16

## 2019-03-29 ASSESSMENT — ENCOUNTER SYMPTOMS
WHEEZING: 1
GASTROINTESTINAL NEGATIVE: 1
EYES NEGATIVE: 1
COUGH: 1
SHORTNESS OF BREATH: 1

## 2019-03-29 NOTE — PROGRESS NOTES
tablet TK 1 T PO BID  3     No current facility-administered medications for this visit. No Known Allergies    Family History   Problem Relation Age of Onset    Heart Disease Mother        Subjective:      Review of Systems   Constitutional: Negative. HENT: Positive for congestion. Eyes: Negative. Respiratory: Positive for cough, shortness of breath and wheezing. Cardiovascular: Negative. Gastrointestinal: Negative. Endocrine: Negative. Genitourinary: Negative. Musculoskeletal: Negative. Skin: Negative. Neurological: Negative. Hematological: Negative. Psychiatric/Behavioral: Positive for decreased concentration. The patient is nervous/anxious. Objective:     Physical Exam   Constitutional: She is oriented to person, place, and time. Vital signs are normal. She appears well-developed and well-nourished. HENT:   Head: Normocephalic and atraumatic. Right Ear: Hearing, tympanic membrane, external ear and ear canal normal.   Left Ear: Hearing, tympanic membrane, external ear and ear canal normal.   Nose: Nose normal.   Mouth/Throat: Uvula is midline, oropharynx is clear and moist and mucous membranes are normal.   Eyes: Pupils are equal, round, and reactive to light. Conjunctivae, EOM and lids are normal. No scleral icterus. Neck: Trachea normal and normal range of motion. Neck supple. No thyroid mass and no thyromegaly present. Cardiovascular: Normal rate, regular rhythm, normal heart sounds and normal pulses. Pulmonary/Chest: Effort normal. She has no decreased breath sounds. She has no wheezes. She has no rhonchi. She has no rales. Abdominal: Soft. Normal appearance and bowel sounds are normal.   Musculoskeletal: Normal range of motion. Cervical back: Normal. She exhibits normal range of motion and no tenderness. Thoracic back: Normal. She exhibits normal range of motion and no tenderness.         Lumbar back: Normal. She exhibits normal range of motion and no tenderness. Neurological: She is alert and oriented to person, place, and time. She has normal strength. Skin: Skin is warm, dry and intact. Psychiatric: She has a normal mood and affect. Judgment and thought content normal. Her mood appears not anxious. Her speech is delayed. She is slowed and withdrawn. Cognition and memory are normal. She does not exhibit a depressed mood. Nursing note and vitals reviewed. /60   Pulse 93   Temp 98.9 °F (37.2 °C) (Temporal)   Ht 4' 11\" (1.499 m)   Wt 163 lb (73.9 kg)   SpO2 98%   BMI 32.92 kg/m²     Assessment:      Diagnosis Orders   1. Other schizophrenia ([facility]     2. Stage 2 moderate COPD by GOLD classification ([facility]  albuterol sulfate HFA (VENTOLIN HFA) 108 (90 Base) MCG/ACT inhaler    tiotropium (SPIRIVA RESPIMAT) 2.5 MCG/ACT AERS inhaler   3. Cigarette nicotine dependence without complication     4. Tobacco abuse counseling         No results found for this visit on 03/29/19. Plan:     Overall patient is doing well. I am starting Spiriva to see if this will help with increased wheezing. Approximately 5 minutes of education was provided about quit smoking and the harms of tobacco.  Patient does show understanding. Patient does not have the desire to quit smoking in the future. Return in about 6 months (around 9/29/2019) for Annual Physical Exam.    No orders of the defined types were placed in this encounter.       Orders Placed This Encounter   Medications    albuterol sulfate HFA (VENTOLIN HFA) 108 (90 Base) MCG/ACT inhaler     Sig: Inhale 2 puffs into the lungs every 6 hours as needed for Wheezing     Dispense:  1 Inhaler     Refill:  3    tiotropium (SPIRIVA RESPIMAT) 2.5 MCG/ACT AERS inhaler     Sig: Inhale 2 puffs into the lungs daily     Dispense:  1 Inhaler     Refill:  2    loratadine (CLARITIN) 10 MG tablet     Sig: Take 1 tablet by mouth daily     Dispense:  30 tablet     Refill:  5        Patient offered educational handouts and has had all questions answered. Patient voices understanding and agrees to plans along with risks and benefits of plan. Patient is instructed to continue prior meds, diet, and exercise plans as instructed. Patient agrees to follow up as instructed and sooner if needed. Patient agrees to go to ER if condition becomes emergent. EMR Dragon/transcription disclaimer: Some of this encounter note is an electronic transcription/translation of spoken language to printed text. The electronic translation of spoken language may permit erroneous, or at times, nonsensical words or phrases to be inadvertently transcribed.  Although I have reviewed the note for such errors, some may still exist.    Electronically signed by BARBARA Richards on 4/3/2019 at 1:25 PM

## 2019-04-09 RX ORDER — DOCUSATE SODIUM 100 MG/1
100 CAPSULE, LIQUID FILLED ORAL 2 TIMES DAILY
Qty: 60 CAPSULE | Refills: 3 | Status: SHIPPED | OUTPATIENT
Start: 2019-04-09 | End: 2019-09-22 | Stop reason: SDUPTHER

## 2019-05-15 DIAGNOSIS — E55.9 VITAMIN D DEFICIENCY: ICD-10-CM

## 2019-05-15 RX ORDER — ERGOCALCIFEROL 1.25 MG/1
CAPSULE ORAL
Qty: 4 CAPSULE | Refills: 0 | Status: SHIPPED | OUTPATIENT
Start: 2019-05-15 | End: 2019-10-08 | Stop reason: SDUPTHER

## 2019-06-24 ENCOUNTER — HOSPITAL ENCOUNTER (EMERGENCY)
Age: 44
Discharge: ANOTHER HOSPITAL | End: 2019-06-25
Attending: EMERGENCY MEDICINE
Payer: MEDICAID

## 2019-06-24 ENCOUNTER — APPOINTMENT (OUTPATIENT)
Dept: GENERAL RADIOLOGY | Age: 44
End: 2019-06-24
Payer: MEDICAID

## 2019-06-24 DIAGNOSIS — D64.9 ANEMIA, UNSPECIFIED TYPE: ICD-10-CM

## 2019-06-24 DIAGNOSIS — R46.89 AGGRESSIVE BEHAVIOR: ICD-10-CM

## 2019-06-24 DIAGNOSIS — F20.9 SCHIZOPHRENIA, UNSPECIFIED TYPE (HCC): Primary | ICD-10-CM

## 2019-06-24 LAB
ACETAMINOPHEN LEVEL: 19 UG/ML
ALBUMIN SERPL-MCNC: 4.1 G/DL (ref 3.5–5.2)
ALP BLD-CCNC: 81 U/L (ref 35–104)
ALT SERPL-CCNC: <5 U/L (ref 5–33)
AMPHETAMINE SCREEN, URINE: NEGATIVE
ANION GAP SERPL CALCULATED.3IONS-SCNC: 15 MMOL/L (ref 7–19)
AST SERPL-CCNC: 8 U/L (ref 5–32)
BARBITURATE SCREEN URINE: NEGATIVE
BASOPHILS ABSOLUTE: 0 K/UL (ref 0–0.2)
BASOPHILS RELATIVE PERCENT: 0.4 % (ref 0–1)
BENZODIAZEPINE SCREEN, URINE: NEGATIVE
BILIRUB SERPL-MCNC: <0.2 MG/DL (ref 0.2–1.2)
BILIRUBIN URINE: NEGATIVE
BLOOD, URINE: NEGATIVE
BUN BLDV-MCNC: 5 MG/DL (ref 6–20)
CALCIUM SERPL-MCNC: 9.2 MG/DL (ref 8.6–10)
CANNABINOID SCREEN URINE: NEGATIVE
CHLORIDE BLD-SCNC: 107 MMOL/L (ref 98–111)
CLARITY: CLEAR
CO2: 22 MMOL/L (ref 22–29)
COCAINE METABOLITE SCREEN URINE: NEGATIVE
COLOR: YELLOW
CREAT SERPL-MCNC: 0.8 MG/DL (ref 0.5–0.9)
EOSINOPHILS ABSOLUTE: 0 K/UL (ref 0–0.6)
EOSINOPHILS RELATIVE PERCENT: 0.4 % (ref 0–5)
ETHANOL: <10 MG/DL (ref 0–0.08)
GFR NON-AFRICAN AMERICAN: >60
GLUCOSE BLD-MCNC: 117 MG/DL (ref 74–109)
GLUCOSE URINE: NEGATIVE MG/DL
HCT VFR BLD CALC: 32.1 % (ref 37–47)
HEMOGLOBIN: 9.8 G/DL (ref 12–16)
KETONES, URINE: NEGATIVE MG/DL
LEUKOCYTE ESTERASE, URINE: NEGATIVE
LYMPHOCYTES ABSOLUTE: 1.5 K/UL (ref 1.1–4.5)
LYMPHOCYTES RELATIVE PERCENT: 29.2 % (ref 20–40)
Lab: NORMAL
MCH RBC QN AUTO: 23 PG (ref 27–31)
MCHC RBC AUTO-ENTMCNC: 30.5 G/DL (ref 33–37)
MCV RBC AUTO: 75.2 FL (ref 81–99)
MONOCYTES ABSOLUTE: 0.4 K/UL (ref 0–0.9)
MONOCYTES RELATIVE PERCENT: 8.5 % (ref 0–10)
NEUTROPHILS ABSOLUTE: 3.1 K/UL (ref 1.5–7.5)
NEUTROPHILS RELATIVE PERCENT: 60.9 % (ref 50–65)
NITRITE, URINE: NEGATIVE
OPIATE SCREEN URINE: NEGATIVE
PDW BLD-RTO: 19.4 % (ref 11.5–14.5)
PH UA: 7 (ref 5–8)
PLATELET # BLD: 454 K/UL (ref 130–400)
PMV BLD AUTO: 10.3 FL (ref 9.4–12.3)
POTASSIUM SERPL-SCNC: 3.6 MMOL/L (ref 3.5–5)
PROTEIN UA: NEGATIVE MG/DL
RBC # BLD: 4.27 M/UL (ref 4.2–5.4)
SALICYLATE, SERUM: <3 MG/DL (ref 3–10)
SODIUM BLD-SCNC: 144 MMOL/L (ref 136–145)
SPECIFIC GRAVITY UA: 1.01 (ref 1–1.03)
TOTAL PROTEIN: 7.5 G/DL (ref 6.6–8.7)
URINE REFLEX TO CULTURE: NORMAL
UROBILINOGEN, URINE: 0.2 E.U./DL
WBC # BLD: 5.1 K/UL (ref 4.8–10.8)

## 2019-06-24 PROCEDURE — 99285 EMERGENCY DEPT VISIT HI MDM: CPT

## 2019-06-24 PROCEDURE — G0480 DRUG TEST DEF 1-7 CLASSES: HCPCS

## 2019-06-24 PROCEDURE — 71045 X-RAY EXAM CHEST 1 VIEW: CPT

## 2019-06-24 PROCEDURE — 36415 COLL VENOUS BLD VENIPUNCTURE: CPT

## 2019-06-24 PROCEDURE — 85025 COMPLETE CBC W/AUTO DIFF WBC: CPT

## 2019-06-24 PROCEDURE — 81003 URINALYSIS AUTO W/O SCOPE: CPT

## 2019-06-24 PROCEDURE — 80053 COMPREHEN METABOLIC PANEL: CPT

## 2019-06-24 PROCEDURE — 80307 DRUG TEST PRSMV CHEM ANLYZR: CPT

## 2019-06-24 ASSESSMENT — ENCOUNTER SYMPTOMS
SHORTNESS OF BREATH: 0
BACK PAIN: 0
RHINORRHEA: 0
ABDOMINAL PAIN: 0
COUGH: 1
DIARRHEA: 0
SORE THROAT: 0
VOMITING: 0
NAUSEA: 0

## 2019-06-25 VITALS
SYSTOLIC BLOOD PRESSURE: 129 MMHG | RESPIRATION RATE: 18 BRPM | TEMPERATURE: 98.2 F | DIASTOLIC BLOOD PRESSURE: 70 MMHG | HEIGHT: 59 IN | OXYGEN SATURATION: 97 % | WEIGHT: 163 LBS | HEART RATE: 84 BPM | BODY MASS INDEX: 32.86 KG/M2

## 2019-06-25 LAB — ACETAMINOPHEN LEVEL: <15 UG/ML

## 2019-06-25 PROCEDURE — 36415 COLL VENOUS BLD VENIPUNCTURE: CPT

## 2019-06-25 PROCEDURE — G0480 DRUG TEST DEF 1-7 CLASSES: HCPCS

## 2019-06-25 PROCEDURE — 99285 EMERGENCY DEPT VISIT HI MDM: CPT | Performed by: EMERGENCY MEDICINE

## 2019-06-25 ASSESSMENT — LIFESTYLE VARIABLES: HISTORY_ALCOHOL_USE: NO
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
## 2019-06-25 NOTE — ED NOTES
[id number] Thea Waldrop contacted.   [facility] to be dispatched       Priclia Everett RN  06/25/19 5987

## 2019-06-25 NOTE — ED PROVIDER NOTES
[facility] EMERGENCY DEPT  eMERGENCYdEPARTMENT eNCOUnter      Pt Name: Nils Romero  MRN: [id number]  Birthdate 1975  Date of evaluation: 6/24/2019  Red Arreola MD    Emergency Department care of this patient was assumed at [phone] from Dr. Erlinda Olivera. We have discussed the case and the plan of care. I have seen and evaluated patient and reviewed ED course. Hx of schizophrenia. Eval Tylenol level repeat any time now. Will need to transfer to [facility]. BH to eval.     CHIEF COMPLAINT       Chief Complaint   Patient presents with   [address] Problem     aggressive behavior towards famiy members         PHYSICAL EXAM    (up to 7 for level 4, 8 or more for level 5)     ED Triage Vitals   BP Temp Temp Source Pulse Resp SpO2 Height Weight   06/24/19 1853 06/24/19 1853 06/24/19 1904 06/24/19 1853 06/24/19 1853 06/24/19 1853 06/24/19 1853 06/24/19 1853   128/83 98.2 °F (36.8 °C) Temporal 113 18 93 % 4' 11\" (1.499 m) 163 lb (73.9 kg)       Physical Exam  Please see Dr. Kendrick Jackson note for full details. DIAGNOSTIC RESULTS     EKG: All EKG's are interpreted by the Emergency Department Physician who either signs or Co-signs this chart inthe absence of a cardiologist.        RADIOLOGY:   Non-plain film imagessuch as CT, Ultrasound and MRI are read by the radiologist. Plain radiographic images are visualized and preliminarily interpreted by the emergency physician with the below findings:    XR CHEST PORTABLE   Final Result   1. No radiographic evidence of acute cardiopulmonary process.    Signed by Dr Alfred Mo on 6/24/2019 8:38 PM              LABS:  Labs Reviewed   CBC WITH AUTO DIFFERENTIAL - Abnormal; Notable for the following components:       Result Value    Hemoglobin 9.8 (*)     Hematocrit 32.1 (*)     MCV 75.2 (*)     MCH 23.0 (*)     MCHC 30.5 (*)     RDW 19.4 (*)     Platelets 738 (*)     All other components within normal limits   COMPREHENSIVE METABOLIC PANEL - Abnormal; Notable for the following components:    Glucose 117 (*)     BUN 5 (*)     ALT <5 (*)     All other components within normal limits   ACETAMINOPHEN LEVEL   ETHANOL   URINE DRUG SCREEN   URINE RT REFLEX TO CULTURE   SALICYLATE LEVEL   ACETAMINOPHEN LEVEL       All other labs were within normal range or not returned as of this dictation. EMERGENCY DEPARTMENT COURSE and DIFFERENTIAL DIAGNOSIS/MDM:   Vitals:    Vitals:    06/24/19 1853 06/24/19 1904 06/25/19 0133   BP: 128/83 129/88 138/87   Pulse: 113 108 82   Resp: 18 18 18   Temp: 98.2 °F (36.8 °C) 98.2 °F (36.8 °C)    TempSrc:  Temporal    SpO2: 93% 98% 98%   Weight: 163 lb (73.9 kg)     Height: 4' 11\" (1.499 m)         MDM  Number of Diagnoses or Management Options  Aggressive behavior: new and requires workup  Schizophrenia, unspecified type [facility]): new and requires workup  Diagnosis management comments: Patient was evaluated by the [facility]. she was not willing to answer any questions during the interview. They will have me call and do a doctor to doctor with the psychiatrist at [facility]. I spoke with Dr. Tiffanie Ac and he has agreed to accept the pt in transfer. Amount and/or Complexity of Data Reviewed  Decide to obtain previous medical records or to obtain history from someone other than the patient: yes    Patient Progress  Patient progress: stable      Reassessment      CONSULTS:  IP CONSULT TO PSYCHIATRY    PROCEDURES:  Unless otherwise noted below, none     Procedures    FINAL IMPRESSION      1. Schizophrenia, unspecified type ([facility]    2. Aggressive behavior    3. Anemia, unspecified type          DISPOSITION/PLAN   DISPOSITION     PATIENT REFERRED TO:  No follow-up provider specified.     DISCHARGE MEDICATIONS:  New Prescriptions    No medications on file          (Please note that portions ofthis note were completed with a voice recognition program.  Efforts were made to edit the dictations but occasionally words are mis-transcribed.)    Jovanny Doherty MD(electronically signed)  Attending Emergency Physician          Maryan Miguel MD  06/25/19 9916

## 2019-06-25 NOTE — ED PROVIDER NOTES
[facility]  eMERGENCY dEPARTMENT eNCOUnter      Pt Name: Chichi Griffith  MRN: [id number]  [place] 1975  Date of evaluation: 6/24/2019  Provider: Floresita Vuong MD    CHIEF COMPLAINT       Chief Complaint   Patient presents with   [address] Problem     aggressive behavior towards famiy members         HISTORY OF PRESENT ILLNESS   (Location/Symptom, Timing/Onset,Context/Setting, Quality, Duration, Modifying Factors, Severity)  Note limiting factors. Chichi Griffith is a 44 y.o. female who presents to the emergency department with aggressive behavior towards family. Patient has a history of schizophrenia. She has not been taking her medications as prescribed. Family has young children in the house as pt's mother has custody of grandchildren. Pt has previously required admission to [facility] for behavior problems. Has been a long time since she has required patient treatment. Auditory hallucinations. Cough. Heavy smoker    HPI    NursingNotes were reviewed. REVIEW OF SYSTEMS    (2-9 systems for level 4, 10 or more for level 5)     Review of Systems   Constitutional: Negative for chills and fever. HENT: Negative for rhinorrhea and sore throat. Toothache   Respiratory: Positive for cough. Negative for shortness of breath. Cardiovascular: Negative for chest pain and leg swelling. Gastrointestinal: Negative for abdominal pain, diarrhea, nausea and vomiting. Genitourinary: Negative for difficulty urinating. Musculoskeletal: Negative for back pain and neck pain. Skin: Negative for rash. Neurological: Negative for weakness and headaches. Psychiatric/Behavioral: Negative for confusion. A complete review of systems was performed and is negative except as noted above in the HPI. PAST MEDICAL HISTORY     Past Medical History:   Diagnosis Date    Nicotine dependence     Schizophrenia ([facility] [address]          SURGICAL HISTORY     History reviewed.  No pertinent surgical history. CURRENT MEDICATIONS       Previous Medications    ALBUTEROL SULFATE HFA (VENTOLIN HFA) 108 (90 BASE) MCG/ACT INHALER    Inhale 2 puffs into the lungs every 6 hours as needed for Wheezing    DOCUSATE SODIUM (COLACE) 100 MG CAPSULE    Take 1 capsule by mouth 2 times daily    DOXEPIN (SINEQUAN) 10 MG CAPSULE    TK ONE C PO QHS    LORATADINE (CLARITIN) 10 MG TABLET    Take 1 tablet by mouth daily    METFORMIN (GLUCOPHAGE) 500 MG TABLET    Take 1 tablet by mouth 2 times daily (with meals)    OLANZAPINE (ZYPREXA) 10 MG TABLET    TK 1 T PO QHS    OXCARBAZEPINE (TRILEPTAL) 150 MG TABLET    TK TWO TS PO BID    RISPERIDONE (RISPERDAL) 3 MG TABLET    TK 1 T PO BID    TIOTROPIUM (SPIRIVA RESPIMAT) 2.5 MCG/ACT AERS INHALER    Inhale 2 puffs into the lungs daily    VITAMIN D (ERGOCALCIFEROL) 78716 UNITS CAPS CAPSULE    TAKE 1 CAPSULE BY MOUTH 1 TIME A WEEK AFTER A MEAL       ALLERGIES     Patient has no known allergies.     FAMILY HISTORY       Family History   Problem Relation Age of Onset    Heart Disease Mother           SOCIAL HISTORY       Social History     Socioeconomic History    Marital status: Single     Spouse name: None    Number of children: None    Years of education: None    Highest education level: None   Occupational History    None   Social Needs    Financial resource strain: None    Food insecurity:     Worry: None     Inability: None    Transportation needs:     Medical: None     Non-medical: None   Tobacco Use    Smoking status: Current Every Day Smoker     Packs/day: 2.00     Types: Cigarettes    Smokeless tobacco: Never Used   Substance and Sexual Activity    Alcohol use: Yes     Comment: Rare    Drug use: No    Sexual activity: None   Lifestyle    Physical activity:     Days per week: None     Minutes per session: None    Stress: None   Relationships    Social connections:     Talks on phone: None     Gets together: None     Attends Yarsani service: None     Active member of club or organization: None     Attends meetings of clubs or organizations: None     Relationship status: None    Intimate partner violence:     Fear of current or ex partner: None     Emotionally abused: None     Physically abused: None     Forced sexual activity: None   Other Topics Concern    None   Social History Narrative    None       SCREENINGS             PHYSICAL EXAM    (up to 7 for level 4, 8 or more for level 5)     ED Triage Vitals   BP Temp Temp Source Pulse Resp SpO2 Height Weight   06/24/19 1853 06/24/19 1853 06/24/19 1904 06/24/19 1853 06/24/19 1853 06/24/19 1853 06/24/19 1853 06/24/19 1853   128/83 98.2 °F (36.8 °C) Temporal 113 18 93 % 4' 11\" (1.499 m) 163 lb (73.9 kg)       Physical Exam   Constitutional: She is oriented to person, place, and time. She appears well-developed and well-nourished. No distress. HENT:   Head: Normocephalic and atraumatic. Eyes: Pupils are equal, round, and reactive to light. Neck: Normal range of motion. Neck supple. Cardiovascular: Normal rate, regular rhythm, normal heart sounds and intact distal pulses. Pulmonary/Chest: Effort normal and breath sounds normal. No respiratory distress. Abdominal: Soft. Bowel sounds are normal. She exhibits no distension. There is no tenderness. Musculoskeletal: Normal range of motion. She exhibits no edema. Neurological: She is alert and oriented to person, place, and time. No cranial nerve deficit. She exhibits normal muscle tone. Coordination normal.   Skin: Skin is warm and dry. No rash noted. She is not diaphoretic. Psychiatric: Her mood appears anxious. Her affect is blunt. Her speech is delayed. She is agitated, slowed and withdrawn. Cognition and memory are impaired. She expresses impulsivity and inappropriate judgment. She expresses no homicidal and no suicidal ideation. She is inattentive. Nursing note and vitals reviewed.       DIAGNOSTIC RESULTS     EKG: All EKG's are interpreted by the Emergency Department Physician who either signs or Co-signs this chart in the absence of a cardiologist.      RADIOLOGY:   Non-plain film images such as CT, Ultrasound and MRI are read by the radiologist. Plainradiographic images are visualized and preliminarily interpreted by the emergency physician with the below findings:        Interpretation per the Radiologist below, if available at the time of this note:    XR CHEST PORTABLE   Final Result   1. No radiographic evidence of acute cardiopulmonary process. Signed by Dr Lizeth Cruz on 6/24/2019 8:38 PM            ED BEDSIDE ULTRASOUND:   Performed by ED Physician - none    LABS:  Labs Reviewed   CBC WITH AUTO DIFFERENTIAL - Abnormal; Notable for the following components:       Result Value    Hemoglobin 9.8 (*)     Hematocrit 32.1 (*)     MCV 75.2 (*)     MCH 23.0 (*)     MCHC 30.5 (*)     RDW 19.4 (*)     Platelets 712 (*)     All other components within normal limits   COMPREHENSIVE METABOLIC PANEL - Abnormal; Notable for the following components:    Glucose 117 (*)     BUN 5 (*)     ALT <5 (*)     All other components within normal limits   ACETAMINOPHEN LEVEL   ETHANOL   URINE DRUG SCREEN   URINE RT REFLEX TO CULTURE   SALICYLATE LEVEL   ACETAMINOPHEN LEVEL       All other labs were within normal range or not returned as of this dictation. EMERGENCY DEPARTMENT COURSE and DIFFERENTIALDIAGNOSIS/MDM:   Vitals:    Vitals:    06/24/19 1853 06/24/19 1904   BP: 128/83 129/88   Pulse: 113 108   Resp: 18 18   Temp: 98.2 °F (36.8 °C) 98.2 °F (36.8 °C)   TempSrc:  Temporal   SpO2: 93% 98%   Weight: 163 lb (73.9 kg)    Height: 4' 11\" (1.499 m)        MDM  Pt states she took 2 tylenol PM before coming in because she had a toothache. She is a poor historian, will repeat the tylenol level at 4 hours. Pt slightly anemia, denies black or blood stools.   PRETTY Dr COLLEEN BUNCH [facility] end of shift pending repeat tylenol and likely transfer to [facility]  CONSULTS:  None    PROCEDURES:  Unless otherwise notedbelow, none     Procedures    FINAL IMPRESSION     1. Schizophrenia, unspecified type ([facility] [address]    2.  Aggressive behavior          DISPOSITION/PLAN   DISPOSITION        PATIENT REFERRED TO:  @FUP@    DISCHARGE MEDICATIONS:  New Prescriptions    No medications on file          (Please note that portions of this note were completed with a voice recognition program.  Efforts were made to edit the dictations butoccasionally words are mis-transcribed.)    Alecia Oliveira MD (electronically signed)  AttendingEmergency Physician           Alecia Oliveira MD  06/25/19 0002

## 2019-06-25 NOTE — ED NOTES
Anil at bedside to transport patient [facility].       Rannie Brunner, RN  06/25/19 1652

## 2019-06-25 NOTE — ED NOTES
Called security for patient belongings and escort to [facility]  06/25/19 4566

## 2019-08-06 ENCOUNTER — TELEPHONE (OUTPATIENT)
Dept: PRIMARY CARE CLINIC | Age: 44
End: 2019-08-06

## 2019-08-08 RX ORDER — NICOTINE 21 MG/24HR
1 PATCH, TRANSDERMAL 24 HOURS TRANSDERMAL EVERY 24 HOURS
Qty: 30 PATCH | Refills: 0 | Status: SHIPPED | OUTPATIENT
Start: 2019-08-08 | End: 2020-05-22

## 2019-08-09 ENCOUNTER — CARE COORDINATION (OUTPATIENT)
Dept: CARE COORDINATION | Age: 44
End: 2019-08-09

## 2019-09-19 ENCOUNTER — CARE COORDINATION (OUTPATIENT)
Dept: CARE COORDINATION | Age: 44
End: 2019-09-19

## 2019-09-19 NOTE — TELEPHONE ENCOUNTER
Made contact with pt. Sister and informed her that due to pt medical hx of mental illness we will not be able to prescribe her the Chantix nor would we be able to fill out the paperwork. Sister VU and appreciated our call.

## 2019-09-19 NOTE — CARE COORDINATION
Received a Post-It Note stating \"We are unable to prescribe Chantix to this patient due to underlying mental instabilitis. \"   Note attached to [employer] Patient Assistance application. Scanned paperwork.     Sent in basket message to Jeannie Quinn, [address] [place], [place] [address]filomena for Arline Swenson, asking her to call the patient to explain denied request.    Submitted by Nehemiah/GREG

## 2019-09-22 RX ORDER — DOCUSATE SODIUM 100 MG/1
CAPSULE, LIQUID FILLED ORAL
Qty: 60 CAPSULE | Refills: 0 | Status: SHIPPED | OUTPATIENT
Start: 2019-09-22 | End: 2019-10-22 | Stop reason: SDUPTHER

## 2019-10-22 RX ORDER — LORATADINE 10 MG/1
10 TABLET ORAL DAILY
Qty: 30 TABLET | Refills: 0 | Status: SHIPPED | OUTPATIENT
Start: 2019-10-22 | End: 2019-12-22

## 2019-10-22 RX ORDER — DOCUSATE SODIUM 100 MG/1
CAPSULE, LIQUID FILLED ORAL
Qty: 60 CAPSULE | Refills: 0 | Status: SHIPPED | OUTPATIENT
Start: 2019-10-22 | End: 2019-11-22 | Stop reason: SDUPTHER

## 2019-11-10 DIAGNOSIS — E55.9 VITAMIN D DEFICIENCY: ICD-10-CM

## 2019-11-12 RX ORDER — ERGOCALCIFEROL 1.25 MG/1
CAPSULE ORAL
Qty: 4 CAPSULE | Refills: 0 | Status: SHIPPED | OUTPATIENT
Start: 2019-11-12 | End: 2019-12-15 | Stop reason: SDUPTHER

## 2019-11-22 ENCOUNTER — OFFICE VISIT (OUTPATIENT)
Dept: PRIMARY CARE CLINIC | Age: 44
End: 2019-11-22
Payer: MEDICAID

## 2019-11-22 VITALS
RESPIRATION RATE: 17 BRPM | TEMPERATURE: 98.6 F | WEIGHT: 172 LBS | OXYGEN SATURATION: 98 % | BODY MASS INDEX: 34.68 KG/M2 | HEART RATE: 98 BPM | SYSTOLIC BLOOD PRESSURE: 126 MMHG | HEIGHT: 59 IN | DIASTOLIC BLOOD PRESSURE: 86 MMHG

## 2019-11-22 DIAGNOSIS — F20.89 OTHER SCHIZOPHRENIA (HCC): ICD-10-CM

## 2019-11-22 DIAGNOSIS — Z72.0 TOBACCO ABUSE: ICD-10-CM

## 2019-11-22 DIAGNOSIS — E55.9 VITAMIN D DEFICIENCY: ICD-10-CM

## 2019-11-22 DIAGNOSIS — Z71.6 TOBACCO ABUSE COUNSELING: ICD-10-CM

## 2019-11-22 DIAGNOSIS — R73.09 ELEVATED GLUCOSE: ICD-10-CM

## 2019-11-22 DIAGNOSIS — Z00.00 ANNUAL PHYSICAL EXAM: Primary | ICD-10-CM

## 2019-11-22 LAB
ALBUMIN SERPL-MCNC: 3.8 G/DL (ref 3.5–5.2)
ALP BLD-CCNC: 61 U/L (ref 35–104)
ALT SERPL-CCNC: <5 U/L (ref 5–33)
ANION GAP SERPL CALCULATED.3IONS-SCNC: 11 MMOL/L (ref 7–19)
AST SERPL-CCNC: 7 U/L (ref 5–32)
BASOPHILS ABSOLUTE: 0 K/UL (ref 0–0.2)
BASOPHILS RELATIVE PERCENT: 0.3 % (ref 0–1)
BILIRUB SERPL-MCNC: <0.2 MG/DL (ref 0.2–1.2)
BUN BLDV-MCNC: 9 MG/DL (ref 6–20)
CALCIUM SERPL-MCNC: 8.8 MG/DL (ref 8.6–10)
CHLORIDE BLD-SCNC: 100 MMOL/L (ref 98–111)
CO2: 26 MMOL/L (ref 22–29)
CREAT SERPL-MCNC: 0.5 MG/DL (ref 0.5–0.9)
EOSINOPHILS ABSOLUTE: 0 K/UL (ref 0–0.6)
EOSINOPHILS RELATIVE PERCENT: 0.4 % (ref 0–5)
GFR NON-AFRICAN AMERICAN: >60
GLUCOSE BLD-MCNC: 82 MG/DL (ref 74–109)
HBA1C MFR BLD: 5.5 % (ref 4–6)
HCT VFR BLD CALC: 39.6 % (ref 37–47)
HEMOGLOBIN: 12.8 G/DL (ref 12–16)
IMMATURE GRANULOCYTES #: 0 K/UL
LYMPHOCYTES ABSOLUTE: 2.3 K/UL (ref 1.1–4.5)
LYMPHOCYTES RELATIVE PERCENT: 32.8 % (ref 20–40)
MCH RBC QN AUTO: 30.3 PG (ref 27–31)
MCHC RBC AUTO-ENTMCNC: 32.3 G/DL (ref 33–37)
MCV RBC AUTO: 93.6 FL (ref 81–99)
MONOCYTES ABSOLUTE: 0.9 K/UL (ref 0–0.9)
MONOCYTES RELATIVE PERCENT: 12.1 % (ref 0–10)
NEUTROPHILS ABSOLUTE: 3.8 K/UL (ref 1.5–7.5)
NEUTROPHILS RELATIVE PERCENT: 54.1 % (ref 50–65)
PDW BLD-RTO: 17.2 % (ref 11.5–14.5)
PLATELET # BLD: 208 K/UL (ref 130–400)
PMV BLD AUTO: 11.9 FL (ref 9.4–12.3)
POTASSIUM SERPL-SCNC: 3.9 MMOL/L (ref 3.5–5)
RBC # BLD: 4.23 M/UL (ref 4.2–5.4)
SODIUM BLD-SCNC: 137 MMOL/L (ref 136–145)
TOTAL PROTEIN: 6.9 G/DL (ref 6.6–8.7)
VITAMIN D 25-HYDROXY: 39.5 NG/ML
WBC # BLD: 7 K/UL (ref 4.8–10.8)

## 2019-11-22 PROCEDURE — 99396 PREV VISIT EST AGE 40-64: CPT | Performed by: NURSE PRACTITIONER

## 2019-11-22 PROCEDURE — 99406 BEHAV CHNG SMOKING 3-10 MIN: CPT | Performed by: NURSE PRACTITIONER

## 2019-11-22 RX ORDER — DOCUSATE SODIUM 100 MG/1
CAPSULE, LIQUID FILLED ORAL
Qty: 60 CAPSULE | Refills: 3 | Status: SHIPPED | OUTPATIENT
Start: 2019-11-22 | End: 2020-03-20

## 2019-11-22 ASSESSMENT — PATIENT HEALTH QUESTIONNAIRE - PHQ9
1. LITTLE INTEREST OR PLEASURE IN DOING THINGS: 0
SUM OF ALL RESPONSES TO PHQ QUESTIONS 1-9: 0
SUM OF ALL RESPONSES TO PHQ9 QUESTIONS 1 & 2: 0
2. FEELING DOWN, DEPRESSED OR HOPELESS: 0
SUM OF ALL RESPONSES TO PHQ QUESTIONS 1-9: 0

## 2019-11-27 ASSESSMENT — ENCOUNTER SYMPTOMS
GASTROINTESTINAL NEGATIVE: 1
WHEEZING: 1
EYES NEGATIVE: 1

## 2019-12-15 DIAGNOSIS — E55.9 VITAMIN D DEFICIENCY: ICD-10-CM

## 2019-12-16 RX ORDER — ERGOCALCIFEROL 1.25 MG/1
CAPSULE ORAL
Qty: 4 CAPSULE | Refills: 0 | Status: SHIPPED | OUTPATIENT
Start: 2019-12-16 | End: 2020-01-13

## 2020-02-09 ENCOUNTER — OFFICE VISIT (OUTPATIENT)
Dept: URGENT CARE | Age: 45
End: 2020-02-09
Payer: MEDICAID

## 2020-02-09 VITALS
OXYGEN SATURATION: 97 % | RESPIRATION RATE: 18 BRPM | TEMPERATURE: 98.2 F | DIASTOLIC BLOOD PRESSURE: 81 MMHG | WEIGHT: 169 LBS | HEIGHT: 59 IN | BODY MASS INDEX: 34.07 KG/M2 | HEART RATE: 97 BPM | SYSTOLIC BLOOD PRESSURE: 117 MMHG

## 2020-02-09 LAB
APPEARANCE FLUID: NORMAL
BILIRUBIN, POC: NEGATIVE
BLOOD URINE, POC: NORMAL
CLARITY, POC: CLEAR
COLOR, POC: NORMAL
GLUCOSE URINE, POC: NEGATIVE
KETONES, POC: NORMAL
LEUKOCYTE EST, POC: NEGATIVE
NITRITE, POC: NEGATIVE
PH, POC: 6
PROTEIN, POC: NEGATIVE
SPECIFIC GRAVITY, POC: 1.02
UROBILINOGEN, POC: 0.2

## 2020-02-09 PROCEDURE — 81002 URINALYSIS NONAUTO W/O SCOPE: CPT | Performed by: NURSE PRACTITIONER

## 2020-02-09 PROCEDURE — 99213 OFFICE O/P EST LOW 20 MIN: CPT | Performed by: NURSE PRACTITIONER

## 2020-02-09 RX ORDER — AMOXICILLIN 500 MG/1
500 CAPSULE ORAL 2 TIMES DAILY
Qty: 20 CAPSULE | Refills: 0 | Status: SHIPPED | OUTPATIENT
Start: 2020-02-09 | End: 2020-02-19

## 2020-02-09 ASSESSMENT — ENCOUNTER SYMPTOMS
SORE THROAT: 0
HEMOPTYSIS: 0
DIARRHEA: 0
ABDOMINAL PAIN: 0
SINUS PRESSURE: 0
SHORTNESS OF BREATH: 0
COUGH: 1
WHEEZING: 0
EYES NEGATIVE: 1
NAUSEA: 0
VOMITING: 0

## 2020-02-09 ASSESSMENT — VISUAL ACUITY: OU: 1

## 2020-02-09 NOTE — PROGRESS NOTES
[address] [facility] U[facility]  [address]  [address] [place] [address]  Dept: [phone]  Loc: [phone]    Antonieta Huffman is a 38 y.o. female who presents today for her medical conditions/complaintsas noted below. Antonieta Huffman is c/o of Congestion (Patient reports on going for a while no fever ) and Incontinence (Patient reprots leakage for a while )        HPI:     Cough   This is a chronic problem. The current episode started more than 1 month ago. The problem has been unchanged. The problem occurs every few minutes. The cough is non-productive. Pertinent negatives include no chills, ear congestion, ear pain, fever, headaches, hemoptysis, myalgias, nasal congestion, postnasal drip, rash, sore throat, shortness of breath or wheezing. Nothing aggravates the symptoms. Risk factors for lung disease include smoking/tobacco exposure. Treatments tried: Using Albuterol inhaler. The treatment provided mild relief. Her past medical history is significant for COPD. She also notes incontinence with cough and is concerned about this. She denies fever,chills, back pain or abdominal pain. Past Medical History:   Diagnosis Date    Nicotine dependence     Schizophrenia ([facility] [address]      History reviewed. No pertinent surgical history.     Family History   Problem Relation Age of Onset    Heart Disease Mother        Social History     Tobacco Use    Smoking status: Current Every Day Smoker     Packs/day: 2.00     Types: Cigarettes    Smokeless tobacco: Never Used   Substance Use Topics    Alcohol use: Yes     Comment: Rare      Current Outpatient Medications   Medication Sig Dispense Refill    amoxicillin (AMOXIL) 500 MG capsule Take 1 capsule by mouth 2 times daily for 10 days 20 capsule 0    vitamin D (ERGOCALCIFEROL) 1.25 MG (46964 UT) CAPS capsule TAKE 1 CAPSULE BY MOUTH 1 TIME A WEEK AFTER A MEAL 4 capsule 5    loratadine (CLARITIN) 10 MG tablet TAKE 1 TABLET BY MOUTH DAILY place, and time and easily aroused. Mental status is at baseline. Psychiatric:         Attention and Perception: Attention normal.         Mood and Affect: Mood normal. Affect is labile and blunt. Speech: Speech is delayed. Behavior: Behavior normal. Behavior is cooperative. /81   Pulse 97   Temp 98.2 °F (36.8 °C)   Resp 18   Ht 4' 11\" (1.499 m)   Wt 169 lb (76.7 kg)   SpO2 97%   BMI 34.13 kg/m²     :Assessment       Diagnosis Orders   1. Urinary incontinence, unspecified type  POCT Urinalysis no Micro   2. Cough     3. Acute suppurative otitis media of left ear without spontaneous rupture of tympanic membrane, recurrence not specified     4. Chronic obstructive pulmonary disease, unspecified COPD type ([facility]         :Plan      Orders Placed This Encounter   Procedures    POCT Urinalysis no Micro     Results for orders placed or performed in visit on 02/09/20   POCT Urinalysis no Micro   Result Value Ref Range    Color, UA jai     Clarity, UA clear     Glucose, UA POC negative     Bilirubin, UA negative     Ketones, UA trace     Spec Grav, UA 1.025     Blood, UA POC trace-lysed     pH, UA 6.0     Protein, UA POC negative     Urobilinogen, UA 0.2     Leukocytes, UA negative     Nitrite, UA negative     Appearance, Fluid         Return if symptoms worsen or fail to improve. Orders Placed This Encounter   Medications    amoxicillin (AMOXIL) 500 MG capsule     Sig: Take 1 capsule by mouth 2 times daily for 10 days     Dispense:  20 capsule     Refill:  0        Patient Instructions   Plenty of fluids- avoid caffeine  Rest  OTC Tylenol or Motrin as needed  Amoxicillin as prescribed  Discuss urinary incontinence with your PCP if this continues, as now it seems to be related to urge incontinence with cough           Patient given educational materials- see patient instructions. Discussed use, benefit, and side effects of prescribedmedications. All patient questions answered.   Pt voiced understanding.        Electronically signed by BARBARA Schrader CNP on 2/9/2020 at 1:51 PM

## 2020-03-20 RX ORDER — DOCUSATE SODIUM 100 MG/1
CAPSULE, LIQUID FILLED ORAL
Qty: 60 CAPSULE | Refills: 3 | Status: SHIPPED | OUTPATIENT
Start: 2020-03-20 | End: 2020-07-27

## 2020-05-22 ENCOUNTER — VIRTUAL VISIT (OUTPATIENT)
Dept: PRIMARY CARE CLINIC | Age: 45
End: 2020-05-22
Payer: MEDICAID

## 2020-05-22 PROCEDURE — 99406 BEHAV CHNG SMOKING 3-10 MIN: CPT | Performed by: NURSE PRACTITIONER

## 2020-05-22 PROCEDURE — 99214 OFFICE O/P EST MOD 30 MIN: CPT | Performed by: NURSE PRACTITIONER

## 2020-05-22 RX ORDER — ALBUTEROL SULFATE 90 UG/1
2 AEROSOL, METERED RESPIRATORY (INHALATION) EVERY 6 HOURS PRN
Qty: 1 INHALER | Refills: 3 | Status: SHIPPED | OUTPATIENT
Start: 2020-05-22 | End: 2020-08-27

## 2020-05-22 ASSESSMENT — ENCOUNTER SYMPTOMS
GASTROINTESTINAL NEGATIVE: 1
EYES NEGATIVE: 1
WHEEZING: 1

## 2020-05-26 RX ORDER — UMECLIDINIUM 62.5 UG/1
2 AEROSOL, POWDER ORAL DAILY
Qty: 1 EACH | Refills: 2 | Status: SHIPPED | OUTPATIENT
Start: 2020-05-26 | End: 2020-08-25

## 2020-05-26 NOTE — TELEPHONE ENCOUNTER
Spiriva not formulary needs rx for Incruse Ellipta to be sent in as it is formulary. Got VO from BARBARA Cheatham to change to AlephD.

## 2020-07-27 RX ORDER — DOCUSATE SODIUM 100 MG/1
CAPSULE, LIQUID FILLED ORAL
Qty: 60 CAPSULE | Refills: 3 | Status: SHIPPED | OUTPATIENT
Start: 2020-07-27 | End: 2021-04-16 | Stop reason: SDUPTHER

## 2020-08-25 RX ORDER — UMECLIDINIUM 62.5 UG/1
AEROSOL, POWDER ORAL
Qty: 30 EACH | Refills: 2 | Status: SHIPPED | OUTPATIENT
Start: 2020-08-25 | End: 2020-12-09

## 2020-08-27 RX ORDER — ALBUTEROL SULFATE 90 UG/1
2 AEROSOL, METERED RESPIRATORY (INHALATION) EVERY 6 HOURS PRN
Qty: 8.5 G | Refills: 2 | Status: SHIPPED | OUTPATIENT
Start: 2020-08-27 | End: 2021-10-05 | Stop reason: SDUPTHER

## 2021-04-16 ENCOUNTER — VIRTUAL VISIT (OUTPATIENT)
Dept: PRIMARY CARE CLINIC | Age: 46
End: 2021-04-16
Payer: MEDICAID

## 2021-04-16 DIAGNOSIS — F20.89 OTHER SCHIZOPHRENIA (HCC): ICD-10-CM

## 2021-04-16 DIAGNOSIS — Z72.0 TOBACCO ABUSE: ICD-10-CM

## 2021-04-16 DIAGNOSIS — J44.9 STAGE 2 MODERATE COPD BY GOLD CLASSIFICATION (HCC): Primary | ICD-10-CM

## 2021-04-16 DIAGNOSIS — Z71.6 TOBACCO ABUSE COUNSELING: ICD-10-CM

## 2021-04-16 DIAGNOSIS — K59.01 SLOW TRANSIT CONSTIPATION: ICD-10-CM

## 2021-04-16 PROCEDURE — 99214 OFFICE O/P EST MOD 30 MIN: CPT | Performed by: NURSE PRACTITIONER

## 2021-04-16 PROCEDURE — 99406 BEHAV CHNG SMOKING 3-10 MIN: CPT | Performed by: NURSE PRACTITIONER

## 2021-04-16 RX ORDER — DOCUSATE SODIUM 100 MG/1
CAPSULE, LIQUID FILLED ORAL
Qty: 60 CAPSULE | Refills: 11 | Status: SHIPPED | OUTPATIENT
Start: 2021-04-16 | End: 2021-08-11

## 2021-04-16 RX ORDER — DIVALPROEX SODIUM 250 MG/1
250 TABLET, EXTENDED RELEASE ORAL DAILY
Status: ON HOLD | COMMUNITY
End: 2021-09-24 | Stop reason: HOSPADM

## 2021-04-16 ASSESSMENT — PATIENT HEALTH QUESTIONNAIRE - PHQ9
SUM OF ALL RESPONSES TO PHQ QUESTIONS 1-9: 2
2. FEELING DOWN, DEPRESSED OR HOPELESS: 1
1. LITTLE INTEREST OR PLEASURE IN DOING THINGS: 1
SUM OF ALL RESPONSES TO PHQ9 QUESTIONS 1 & 2: 2

## 2021-04-16 ASSESSMENT — ENCOUNTER SYMPTOMS
GASTROINTESTINAL NEGATIVE: 1
RESPIRATORY NEGATIVE: 1
EYES NEGATIVE: 1

## 2021-04-16 NOTE — PROGRESS NOTES
BARBARA Boswell   loratadine (CLARITIN) 10 MG tablet TAKE 1 TABLET BY MOUTH DAILY Yes BARBARA Cortés   metFORMIN (GLUCOPHAGE) 500 MG tablet TAKE 1 TABLET BY MOUTH TWICE DAILY WITH MEALS Yes BARBARA Cortés   OLANZapine (ZYPREXA) 10 MG tablet TK 1 T PO QHS  Historical Provider, MD       Social History     Tobacco Use    Smoking status: Current Every Day Smoker     Packs/day: 2.00     Types: Cigarettes    Smokeless tobacco: Never Used   Substance Use Topics    Alcohol use: Yes     Comment: Rare    Drug use: No        No Known Allergies,   Past Medical History:   Diagnosis Date    Nicotine dependence     Schizophrenia ([facility] [address]    , No past surgical history on file.,   Family History   Problem Relation Age of Onset    Heart Disease Mother        PHYSICAL EXAMINATION:  [ INSTRUCTIONS:  \"[x]\" Indicates a positive item  \"[]\" Indicates a negative item  -- DELETE ALL ITEMS NOT EXAMINED]  Vital Signs: (As obtained by patient/caregiver at home)        Constitutional: [x] Appears well-developed and well-nourished [x] No apparent distress      [] Abnormal   Mental status  [x] Alert and awake  [x] Oriented to person/place/time [x]Able to follow commands        Eyes:  EOM    [x]  Normal  [] Abnormal-  Sclera  [x]  Normal  [] Abnormal -         Discharge []  None visible  [] Abnormal -    HENT:   [x] Normocephalic, atraumatic.   [] Abnormal   [x] Mouth/Throat: Mucous membranes are moist.     External Ears [x] Normal  [] Abnormal-    Neck: [x] No visualized mass     Pulmonary/Chest: [x] Respiratory effort normal.  [x] No visualized signs of difficulty breathing or respiratory distress        [] Abnormal      Musculoskeletal:   [x] Normal gait with no signs of ataxia         [x] Normal range of motion of neck        [] Abnormal       Neurological:        [x] No Facial Asymmetry (Cranial nerve 7 motor function) (limited exam to video visit)          [] No gaze palsy        [] Abnormal         Skin:        [x] No significant exanthematous lesions or discoloration noted on facial skin         [] Abnormal            Psychiatric:       [x] Normal Affect [] Abnormal        [] No Hallucinations    Other pertinent observable physical exam findings:-    Due to this being a TeleHealth encounter, evaluation of the following organ systems is limited: Vitals/Constitutional/EENT/Resp/CV/GI//MS/Neuro/Skin/Heme-Lymph-Imm. ASSESSMENT/PLAN:  1. Stage 2 moderate COPD by GOLD classification ([facility] [address]      2. Other schizophrenia (HCC)    - divalproex (DEPAKOTE ER) 250 MG extended release tablet; Take 250 mg by mouth daily    3. Tobacco abuse      4. Tobacco abuse counseling      5. Slow transit constipation    - docusate sodium (DOK) 100 MG capsule; TAKE 1 CAPSULE BY MOUTH TWICE DAILY  Dispense: 60 capsule; Refill: 11    Patient is to keep appointment with psychiatry as scheduled. Refill on stool softener. Approximately 5 minutes of education was provided about quit smoking and the harms of tobacco.  Patient does show understanding. Patient does not have  the desire to quit smoking in the future. Patient is to keep an office appointment in 1 month for annual physical exam.      Return for change appointment in 1 month to physical exam please. An  electronic signature was used to authenticate this note. --BARBARA Arnold on 4/16/2021 at 8:06 AM        Pursuant to the emergency declaration under the [facility] waiver authority and the [employer] and [employer] Act, this Virtual  Visit was conducted, with patient's consent, to reduce the patient's risk of exposure to COVID-19 and provide continuity of care for an established patient. Services were provided through a video synchronous discussion virtually to substitute for in-person clinic visit.

## 2021-05-12 ENCOUNTER — OFFICE VISIT (OUTPATIENT)
Dept: PRIMARY CARE CLINIC | Age: 46
End: 2021-05-12
Payer: MEDICAID

## 2021-05-12 VITALS
TEMPERATURE: 97.5 F | HEART RATE: 92 BPM | WEIGHT: 208 LBS | OXYGEN SATURATION: 98 % | SYSTOLIC BLOOD PRESSURE: 122 MMHG | DIASTOLIC BLOOD PRESSURE: 84 MMHG | RESPIRATION RATE: 18 BRPM | HEIGHT: 60 IN | BODY MASS INDEX: 40.84 KG/M2

## 2021-05-12 DIAGNOSIS — F20.89 OTHER SCHIZOPHRENIA (HCC): ICD-10-CM

## 2021-05-12 DIAGNOSIS — E55.9 VITAMIN D DEFICIENCY: ICD-10-CM

## 2021-05-12 DIAGNOSIS — J44.9 STAGE 2 MODERATE COPD BY GOLD CLASSIFICATION (HCC): ICD-10-CM

## 2021-05-12 DIAGNOSIS — K59.01 SLOW TRANSIT CONSTIPATION: ICD-10-CM

## 2021-05-12 DIAGNOSIS — Z00.00 ANNUAL PHYSICAL EXAM: Primary | ICD-10-CM

## 2021-05-12 DIAGNOSIS — J40 BRONCHITIS: ICD-10-CM

## 2021-05-12 PROCEDURE — 99396 PREV VISIT EST AGE 40-64: CPT | Performed by: NURSE PRACTITIONER

## 2021-05-12 RX ORDER — PREDNISONE 10 MG/1
10 TABLET ORAL DAILY
Qty: 10 TABLET | Refills: 0 | Status: SHIPPED | OUTPATIENT
Start: 2021-05-12 | End: 2021-05-22

## 2021-05-12 RX ORDER — AZITHROMYCIN 250 MG/1
250 TABLET, FILM COATED ORAL SEE ADMIN INSTRUCTIONS
Qty: 6 TABLET | Refills: 0 | Status: SHIPPED | OUTPATIENT
Start: 2021-05-12 | End: 2021-05-17

## 2021-05-12 SDOH — ECONOMIC STABILITY: INCOME INSECURITY: HOW HARD IS IT FOR YOU TO PAY FOR THE VERY BASICS LIKE FOOD, HOUSING, MEDICAL CARE, AND HEATING?: NOT HARD AT ALL

## 2021-05-12 SDOH — ECONOMIC STABILITY: FOOD INSECURITY: WITHIN THE PAST 12 MONTHS, THE FOOD YOU BOUGHT JUST DIDN'T LAST AND YOU DIDN'T HAVE MONEY TO GET MORE.: NEVER TRUE

## 2021-05-12 ASSESSMENT — ENCOUNTER SYMPTOMS
EYES NEGATIVE: 1
COUGH: 1
GASTROINTESTINAL NEGATIVE: 1
WHEEZING: 1

## 2021-05-12 NOTE — PROGRESS NOTES
[address] [facility]  [address] [place] [address]  [address] [address]  Dept: [phone]  Dept Fax: [phone]: [phone]    Rod Price is a 41 y.o. female who presents today for her medical conditions/complaints as noted below. Rod Price is c/o of Annual Exam, Nasal Congestion, and Swelling      Chief Complaint   Patient presents with    Annual Exam    Nasal Congestion    Swelling       HPI:     HPI   Patient here today for yearly wellness exam.  Patient caregiver reports pt has been smoking and also showing sx of cold she thinks the change of weather has caused a cold. She also is concerned of swelling in hands and feet. Patient has been taking her meds as prescribed. Patient does have follow up with psych later this month as well. Past Medical History:   Diagnosis Date    Nicotine dependence     Schizophrenia ([facility] [address]         History reviewed. No pertinent surgical history.     Social History     Tobacco Use    Smoking status: Current Every Day Smoker     Packs/day: 2.00     Types: Cigarettes    Smokeless tobacco: Never Used   Substance Use Topics    Alcohol use: Yes     Comment: Rare        Current Outpatient Medications   Medication Sig Dispense Refill    predniSONE (DELTASONE) 10 MG tablet Take 1 tablet by mouth daily for 10 days 10 tablet 0    azithromycin (ZITHROMAX) 250 MG tablet Take 1 tablet by mouth See Admin Instructions for 5 days 500mg on day 1 followed by 250mg on days 2 - 5 6 tablet 0    divalproex (DEPAKOTE ER) 250 MG extended release tablet Take 250 mg by mouth daily      docusate sodium (DOK) 100 MG capsule TAKE 1 CAPSULE BY MOUTH TWICE DAILY 60 capsule 11    INCRUSE ELLIPTA 62.5 MCG/INH AEPB INHALE 1 PUFF EVERY DAY AS DIRECTED 30 each 0    albuterol sulfate  (90 Base) MCG/ACT inhaler INHALE 2 PUFFS INTO THE LUNGS EVERY 6 HOURS AS NEEDED FOR WHEEZING 8.5 g 2    tiotropium (SPIRIVA RESPIMAT) 2.5 MCG/ACT AERS inhaler Inhale 2 puffs into the lungs daily 1 Inhaler 2    vitamin D (ERGOCALCIFEROL) 1.25 MG (51637 UT) CAPS capsule TAKE 1 CAPSULE BY MOUTH 1 TIME A WEEK AFTER A MEAL 4 capsule 5    loratadine (CLARITIN) 10 MG tablet TAKE 1 TABLET BY MOUTH DAILY 30 tablet 3    metFORMIN (GLUCOPHAGE) 500 MG tablet TAKE 1 TABLET BY MOUTH TWICE DAILY WITH MEALS 60 tablet 1    OLANZapine (ZYPREXA) 10 MG tablet TK 1 T PO QHS  3     No current facility-administered medications for this visit. No Known Allergies    Family History   Problem Relation Age of Onset    Heart Disease Mother                Subjective:      Review of Systems   Constitutional: Negative. HENT: Positive for congestion. Eyes: Negative. Respiratory: Positive for cough and wheezing. Cardiovascular: Negative. Gastrointestinal: Negative. Endocrine: Negative. Genitourinary: Negative. Musculoskeletal: Negative. Skin: Negative. Neurological: Negative. Hematological: Negative. Psychiatric/Behavioral: Positive for dysphoric mood. Objective:     Physical Exam  Vitals signs and nursing note reviewed. Constitutional:       Appearance: Normal appearance. She is well-developed. HENT:      Head: Normocephalic and atraumatic. Right Ear: Hearing, tympanic membrane, ear canal and external ear normal.      Left Ear: Hearing, tympanic membrane, ear canal and external ear normal.      Nose: Nose normal.      Mouth/Throat:      Pharynx: Uvula midline. Eyes:      General: Lids are normal. No scleral icterus. Conjunctiva/sclera: Conjunctivae normal.      Pupils: Pupils are equal, round, and reactive to light. Neck:      Musculoskeletal: Normal range of motion and neck supple. Thyroid: No thyroid mass or thyromegaly. Trachea: Trachea normal.   Cardiovascular:      Rate and Rhythm: Normal rate and regular rhythm. Pulses: Normal pulses. Heart sounds: Normal heart sounds.    Pulmonary:      Effort: Pulmonary effort is normal.      Breath sounds: Wheezing present. No decreased breath sounds, rhonchi or rales. Abdominal:      General: Bowel sounds are normal.      Palpations: Abdomen is soft. Musculoskeletal: Normal range of motion. Cervical back: Normal. She exhibits normal range of motion and no tenderness. Thoracic back: Normal. She exhibits normal range of motion and no tenderness. Lumbar back: Normal. She exhibits normal range of motion and no tenderness. Skin:     General: Skin is warm and dry. Neurological:      Mental Status: She is alert and oriented to person, place, and time. Psychiatric:         Mood and Affect: Mood is not anxious or depressed. Speech: Speech is delayed. Behavior: Behavior is slowed and withdrawn. Thought Content: Thought content normal.         Judgment: Judgment normal.         /84   Pulse 92   Temp 97.5 °F (36.4 °C) (Temporal)   Resp 18   Ht 5' (1.524 m)   Wt 208 lb (94.3 kg)   SpO2 98%   BMI 40.62 kg/m²     Assessment:      Diagnosis Orders   1. Annual physical exam  CBC Auto Differential    Comprehensive Metabolic Panel    Hemoglobin A1C    Lipid, Fasting   2. Slow transit constipation     3. Other schizophrenia ([facility] [address]  CBC Auto Differential    Comprehensive Metabolic Panel    Hemoglobin A1C    Lipid, Fasting   4. Stage 2 moderate COPD by GOLD classification ([facility] [address]     5. Vitamin D deficiency  Vitamin D 25 Hydroxy   6. Bronchitis  predniSONE (DELTASONE) 10 MG tablet    azithromycin (ZITHROMAX) 250 MG tablet       No results found for this visit on 05/12/21. Plan:     I am checking labs -we will call with these results. abx and steroids for bronchitis. She is to continue inhalers as well. We discussed smoking cessation and patient reports she is not willing to stop at this time. Return in about 6 months (around 11/12/2021) for Office Visit, Follow up chronic conditions.     Orders Placed This Encounter   Procedures

## 2021-05-18 DIAGNOSIS — Z00.00 ANNUAL PHYSICAL EXAM: ICD-10-CM

## 2021-05-18 DIAGNOSIS — F20.89 OTHER SCHIZOPHRENIA (HCC): ICD-10-CM

## 2021-05-18 DIAGNOSIS — E55.9 VITAMIN D DEFICIENCY: ICD-10-CM

## 2021-05-18 LAB
ALBUMIN SERPL-MCNC: 3.7 G/DL (ref 3.5–5.2)
ALP BLD-CCNC: 77 U/L (ref 35–104)
ALT SERPL-CCNC: 6 U/L (ref 5–33)
ANION GAP SERPL CALCULATED.3IONS-SCNC: 9 MMOL/L (ref 7–19)
AST SERPL-CCNC: 8 U/L (ref 5–32)
BASOPHILS ABSOLUTE: 0.1 K/UL (ref 0–0.2)
BASOPHILS RELATIVE PERCENT: 0.6 % (ref 0–1)
BILIRUB SERPL-MCNC: <0.2 MG/DL (ref 0.2–1.2)
BUN BLDV-MCNC: 11 MG/DL (ref 6–20)
CALCIUM SERPL-MCNC: 9.5 MG/DL (ref 8.6–10)
CHLORIDE BLD-SCNC: 98 MMOL/L (ref 98–111)
CHOLESTEROL, FASTING: 141 MG/DL (ref 160–199)
CO2: 28 MMOL/L (ref 22–29)
CREAT SERPL-MCNC: 0.5 MG/DL (ref 0.5–0.9)
EOSINOPHILS ABSOLUTE: 0.1 K/UL (ref 0–0.6)
EOSINOPHILS RELATIVE PERCENT: 0.7 % (ref 0–5)
GFR AFRICAN AMERICAN: >59
GFR NON-AFRICAN AMERICAN: >60
GLUCOSE BLD-MCNC: 99 MG/DL (ref 74–109)
HBA1C MFR BLD: 7.3 % (ref 4–6)
HCT VFR BLD CALC: 36.9 % (ref 37–47)
HDLC SERPL-MCNC: 32 MG/DL (ref 65–121)
HEMOGLOBIN: 11.1 G/DL (ref 12–16)
IMMATURE GRANULOCYTES #: 0.1 K/UL
LDL CHOLESTEROL CALCULATED: 87 MG/DL
LYMPHOCYTES ABSOLUTE: 2.4 K/UL (ref 1.1–4.5)
LYMPHOCYTES RELATIVE PERCENT: 22.6 % (ref 20–40)
MCH RBC QN AUTO: 23.7 PG (ref 27–31)
MCHC RBC AUTO-ENTMCNC: 30.1 G/DL (ref 33–37)
MCV RBC AUTO: 78.7 FL (ref 81–99)
MONOCYTES ABSOLUTE: 0.9 K/UL (ref 0–0.9)
MONOCYTES RELATIVE PERCENT: 8.4 % (ref 0–10)
NEUTROPHILS ABSOLUTE: 7 K/UL (ref 1.5–7.5)
NEUTROPHILS RELATIVE PERCENT: 66.6 % (ref 50–65)
PDW BLD-RTO: 18.9 % (ref 11.5–14.5)
PLATELET # BLD: 325 K/UL (ref 130–400)
PMV BLD AUTO: 10.8 FL (ref 9.4–12.3)
POTASSIUM SERPL-SCNC: 4.2 MMOL/L (ref 3.5–5)
RBC # BLD: 4.69 M/UL (ref 4.2–5.4)
SODIUM BLD-SCNC: 135 MMOL/L (ref 136–145)
TOTAL PROTEIN: 7.6 G/DL (ref 6.6–8.7)
TRIGLYCERIDE, FASTING: 112 MG/DL (ref 0–149)
VITAMIN D 25-HYDROXY: 21.7 NG/ML
WBC # BLD: 10.5 K/UL (ref 4.8–10.8)

## 2021-05-20 ENCOUNTER — TELEPHONE (OUTPATIENT)
Dept: PRIMARY CARE CLINIC | Age: 46
End: 2021-05-20

## 2021-05-20 DIAGNOSIS — E55.9 VITAMIN D DEFICIENCY: ICD-10-CM

## 2021-05-20 RX ORDER — ERGOCALCIFEROL 1.25 MG/1
CAPSULE ORAL
Qty: 4 CAPSULE | Refills: 5 | Status: SHIPPED | OUTPATIENT
Start: 2021-05-20 | End: 2021-12-05

## 2021-05-20 NOTE — TELEPHONE ENCOUNTER
----- Message from Everlyn Goltz, APRN sent at 5/20/2021  7:31 AM CDT -----  Please let patient know that labs have returned. A1c has increased a lot since 1 year ago. Please verify if she is taking the Metformin or not. Does not look like she has had a refill in some time. We need to restart or increase up to 1000 mg BID. Vit d has decreased again as well. Restart the once weekly replacement of 50,000 units please. Other labs were stable.

## 2021-05-20 NOTE — TELEPHONE ENCOUNTER
Pt called back and we discuss results. Pt states she has not been taking Metformin but will restart it. Mower Dee called to request a refill on her medication.       Last office visit : 5/12/2021   Next office visit : 11/16/2021     Requested Prescriptions     Pending Prescriptions Disp Refills    vitamin D (ERGOCALCIFEROL) 1.25 MG (85482 UT) CAPS capsule 4 capsule 5     Sig: TAKE 1 CAPSULE BY MOUTH 1 TIME A WEEK AFTER A MEAL    metFORMIN (GLUCOPHAGE) 500 MG tablet 60 tablet 1     Sig: TAKE 1 TABLET BY MOUTH TWICE DAILY WITH MEALS            Galilea Carmona

## 2021-06-24 LAB — VALPROIC ACID LEVEL: 67.4 UG/ML (ref 50–100)

## 2021-08-11 DIAGNOSIS — K59.01 SLOW TRANSIT CONSTIPATION: ICD-10-CM

## 2021-08-11 RX ORDER — DOCUSATE SODIUM 100 MG/1
CAPSULE, LIQUID FILLED ORAL
Qty: 60 CAPSULE | Refills: 11 | Status: SHIPPED | OUTPATIENT
Start: 2021-08-11

## 2021-09-20 ENCOUNTER — HOSPITAL ENCOUNTER (INPATIENT)
Age: 46
LOS: 3 days | Discharge: HOME | DRG: 885 | End: 2021-09-24
Attending: HOSPITALIST | Admitting: HOSPITALIST
Payer: MEDICAID

## 2021-09-20 DIAGNOSIS — F20.9 SCHIZOPHRENIA, UNSPECIFIED TYPE (HCC): ICD-10-CM

## 2021-09-20 DIAGNOSIS — R44.0 AUDITORY HALLUCINATIONS: ICD-10-CM

## 2021-09-20 DIAGNOSIS — U07.1 COVID-19: Primary | ICD-10-CM

## 2021-09-20 DIAGNOSIS — R46.89 AGGRESSION AGGRAVATED: ICD-10-CM

## 2021-09-20 LAB
ACETAMINOPHEN LEVEL: <15 UG/ML
ALBUMIN SERPL-MCNC: 4.1 G/DL (ref 3.5–5.2)
ALP BLD-CCNC: 94 U/L (ref 35–104)
ALT SERPL-CCNC: 11 U/L (ref 5–33)
AMPHETAMINE SCREEN, URINE: NEGATIVE
ANION GAP SERPL CALCULATED.3IONS-SCNC: 11 MMOL/L (ref 7–19)
ANISOCYTOSIS: ABNORMAL
AST SERPL-CCNC: 12 U/L (ref 5–32)
ATYPICAL LYMPHOCYTE RELATIVE PERCENT: 1 % (ref 0–8)
BACTERIA: ABNORMAL /HPF
BANDED NEUTROPHILS RELATIVE PERCENT: 2 % (ref 0–5)
BARBITURATE SCREEN URINE: NEGATIVE
BASOPHILS ABSOLUTE: 0 K/UL (ref 0–0.2)
BASOPHILS RELATIVE PERCENT: 0 % (ref 0–1)
BENZODIAZEPINE SCREEN, URINE: NEGATIVE
BILIRUB SERPL-MCNC: <0.2 MG/DL (ref 0.2–1.2)
BILIRUBIN URINE: NEGATIVE
BLOOD, URINE: NEGATIVE
BUN BLDV-MCNC: 8 MG/DL (ref 6–20)
CALCIUM SERPL-MCNC: 9.6 MG/DL (ref 8.6–10)
CANNABINOID SCREEN URINE: NEGATIVE
CHLORIDE BLD-SCNC: 104 MMOL/L (ref 98–111)
CLARITY: CLEAR
CO2: 26 MMOL/L (ref 22–29)
COCAINE METABOLITE SCREEN URINE: NEGATIVE
COLOR: YELLOW
CREAT SERPL-MCNC: 0.6 MG/DL (ref 0.5–0.9)
CRYSTALS, UA: ABNORMAL /HPF
EOSINOPHILS ABSOLUTE: 0 K/UL (ref 0–0.6)
EOSINOPHILS RELATIVE PERCENT: 0 % (ref 0–5)
EPITHELIAL CELLS, UA: 10 /HPF (ref 0–5)
ETHANOL: <10 MG/DL (ref 0–0.08)
GFR AFRICAN AMERICAN: >59
GFR NON-AFRICAN AMERICAN: >60
GLUCOSE BLD-MCNC: 106 MG/DL (ref 74–109)
GLUCOSE URINE: NEGATIVE MG/DL
HCG(URINE) PREGNANCY TEST: NEGATIVE
HCT VFR BLD CALC: 35.7 % (ref 37–47)
HEMOGLOBIN: 10.4 G/DL (ref 12–16)
HYALINE CASTS: 12 /HPF (ref 0–8)
HYPOCHROMIA: ABNORMAL
IMMATURE GRANULOCYTES #: 0 K/UL
KETONES, URINE: NEGATIVE MG/DL
LEUKOCYTE ESTERASE, URINE: ABNORMAL
LYMPHOCYTES ABSOLUTE: 1.4 K/UL (ref 1.1–4.5)
LYMPHOCYTES RELATIVE PERCENT: 17 % (ref 20–40)
Lab: NORMAL
MAGNESIUM: 2 MG/DL (ref 1.6–2.6)
MCH RBC QN AUTO: 20.9 PG (ref 27–31)
MCHC RBC AUTO-ENTMCNC: 29.1 G/DL (ref 33–37)
MCV RBC AUTO: 71.8 FL (ref 81–99)
MICROCYTES: ABNORMAL
MONOCYTES ABSOLUTE: 0.8 K/UL (ref 0–0.9)
MONOCYTES RELATIVE PERCENT: 10 % (ref 0–10)
NEUTROPHILS ABSOLUTE: 5.5 K/UL (ref 1.5–7.5)
NEUTROPHILS RELATIVE PERCENT: 70 % (ref 50–65)
NITRITE, URINE: NEGATIVE
OPIATE SCREEN URINE: NEGATIVE
PDW BLD-RTO: 22.4 % (ref 11.5–14.5)
PH UA: 6.5 (ref 5–8)
PLATELET # BLD: 399 K/UL (ref 130–400)
PLATELET SLIDE REVIEW: ADEQUATE
PMV BLD AUTO: 10.1 FL (ref 9.4–12.3)
POTASSIUM REFLEX MAGNESIUM: 3.4 MMOL/L (ref 3.5–5)
PROTEIN UA: NEGATIVE MG/DL
RBC # BLD: 4.97 M/UL (ref 4.2–5.4)
RBC UA: 3 /HPF (ref 0–4)
SALICYLATE, SERUM: <3 MG/DL (ref 3–10)
SODIUM BLD-SCNC: 141 MMOL/L (ref 136–145)
SPECIFIC GRAVITY UA: 1.01 (ref 1–1.03)
TOTAL PROTEIN: 7.6 G/DL (ref 6.6–8.7)
UROBILINOGEN, URINE: 1 E.U./DL
WBC # BLD: 7.6 K/UL (ref 4.8–10.8)
WBC UA: 5 /HPF (ref 0–5)

## 2021-09-20 PROCEDURE — 82607 VITAMIN B-12: CPT

## 2021-09-20 PROCEDURE — 86140 C-REACTIVE PROTEIN: CPT

## 2021-09-20 PROCEDURE — 36415 COLL VENOUS BLD VENIPUNCTURE: CPT

## 2021-09-20 PROCEDURE — 87086 URINE CULTURE/COLONY COUNT: CPT

## 2021-09-20 PROCEDURE — 81001 URINALYSIS AUTO W/SCOPE: CPT

## 2021-09-20 PROCEDURE — 82728 ASSAY OF FERRITIN: CPT

## 2021-09-20 PROCEDURE — 83735 ASSAY OF MAGNESIUM: CPT

## 2021-09-20 PROCEDURE — 80179 DRUG ASSAY SALICYLATE: CPT

## 2021-09-20 PROCEDURE — 82746 ASSAY OF FOLIC ACID SERUM: CPT

## 2021-09-20 PROCEDURE — 82306 VITAMIN D 25 HYDROXY: CPT

## 2021-09-20 PROCEDURE — 85025 COMPLETE CBC W/AUTO DIFF WBC: CPT

## 2021-09-20 PROCEDURE — 80164 ASSAY DIPROPYLACETIC ACD TOT: CPT

## 2021-09-20 PROCEDURE — 84703 CHORIONIC GONADOTROPIN ASSAY: CPT

## 2021-09-20 PROCEDURE — 82077 ASSAY SPEC XCP UR&BREATH IA: CPT

## 2021-09-20 PROCEDURE — 99284 EMERGENCY DEPT VISIT MOD MDM: CPT

## 2021-09-20 PROCEDURE — 80307 DRUG TEST PRSMV CHEM ANLYZR: CPT

## 2021-09-20 PROCEDURE — 80143 DRUG ASSAY ACETAMINOPHEN: CPT

## 2021-09-20 PROCEDURE — 84443 ASSAY THYROID STIM HORMONE: CPT

## 2021-09-20 PROCEDURE — 80053 COMPREHEN METABOLIC PANEL: CPT

## 2021-09-20 ASSESSMENT — ENCOUNTER SYMPTOMS
SORE THROAT: 0
SHORTNESS OF BREATH: 0
COUGH: 0
EYE DISCHARGE: 0
RHINORRHEA: 0
ABDOMINAL PAIN: 0
NAUSEA: 0
EYE PAIN: 0
BACK PAIN: 0
PHOTOPHOBIA: 0
APNEA: 0
ABDOMINAL DISTENTION: 0
COLOR CHANGE: 0

## 2021-09-21 ENCOUNTER — APPOINTMENT (OUTPATIENT)
Dept: GENERAL RADIOLOGY | Age: 46
DRG: 885 | End: 2021-09-21
Payer: MEDICAID

## 2021-09-21 PROBLEM — F23 ACUTE PSYCHOSIS (HCC): Status: ACTIVE | Noted: 2021-09-21

## 2021-09-21 LAB
C-REACTIVE PROTEIN: 1.78 MG/DL (ref 0–0.5)
FERRITIN: 10.2 NG/ML (ref 13–150)
FOLATE: 6.9 NG/ML (ref 4.8–37.3)
SARS-COV-2, NAAT: DETECTED
TSH SERPL DL<=0.05 MIU/L-ACNC: 1.72 UIU/ML (ref 0.27–4.2)
VALPROIC ACID LEVEL: <2.8 UG/ML (ref 50–100)
VITAMIN B-12: 313 PG/ML (ref 211–946)
VITAMIN D 25-HYDROXY: 32.6 NG/ML

## 2021-09-21 PROCEDURE — 87635 SARS-COV-2 COVID-19 AMP PRB: CPT

## 2021-09-21 PROCEDURE — 6360000002 HC RX W HCPCS: Performed by: PHYSICIAN ASSISTANT

## 2021-09-21 PROCEDURE — 71045 X-RAY EXAM CHEST 1 VIEW: CPT

## 2021-09-21 PROCEDURE — 2580000003 HC RX 258: Performed by: HOSPITALIST

## 2021-09-21 PROCEDURE — 99253 IP/OBS CNSLTJ NEW/EST LOW 45: CPT | Performed by: PSYCHIATRY & NEUROLOGY

## 2021-09-21 PROCEDURE — 6370000000 HC RX 637 (ALT 250 FOR IP): Performed by: PSYCHIATRY & NEUROLOGY

## 2021-09-21 PROCEDURE — 1210000000 HC MED SURG R&B

## 2021-09-21 PROCEDURE — 96372 THER/PROPH/DIAG INJ SC/IM: CPT

## 2021-09-21 PROCEDURE — 6370000000 HC RX 637 (ALT 250 FOR IP): Performed by: HOSPITALIST

## 2021-09-21 PROCEDURE — 6360000002 HC RX W HCPCS: Performed by: INTERNAL MEDICINE

## 2021-09-21 RX ORDER — POTASSIUM CHLORIDE 20 MEQ/1
20 TABLET, EXTENDED RELEASE ORAL ONCE
Status: DISCONTINUED | OUTPATIENT
Start: 2021-09-21 | End: 2021-09-24 | Stop reason: HOSPADM

## 2021-09-21 RX ORDER — POLYETHYLENE GLYCOL 3350 17 G/17G
17 POWDER, FOR SOLUTION ORAL DAILY PRN
Status: DISCONTINUED | OUTPATIENT
Start: 2021-09-21 | End: 2021-09-24 | Stop reason: HOSPADM

## 2021-09-21 RX ORDER — ALBUTEROL SULFATE 90 UG/1
2 AEROSOL, METERED RESPIRATORY (INHALATION) EVERY 6 HOURS PRN
Status: DISCONTINUED | OUTPATIENT
Start: 2021-09-21 | End: 2021-09-24 | Stop reason: HOSPADM

## 2021-09-21 RX ORDER — SODIUM CHLORIDE 9 MG/ML
25 INJECTION, SOLUTION INTRAVENOUS PRN
Status: DISCONTINUED | OUTPATIENT
Start: 2021-09-21 | End: 2021-09-24 | Stop reason: HOSPADM

## 2021-09-21 RX ORDER — POLYETHYLENE GLYCOL 3350 17 G/17G
17 POWDER, FOR SOLUTION ORAL DAILY PRN
Status: DISCONTINUED | OUTPATIENT
Start: 2021-09-21 | End: 2021-09-21 | Stop reason: CLARIF

## 2021-09-21 RX ORDER — CHLORPROMAZINE HYDROCHLORIDE 25 MG/ML
25 INJECTION INTRAMUSCULAR ONCE
Status: COMPLETED | OUTPATIENT
Start: 2021-09-21 | End: 2021-09-21

## 2021-09-21 RX ORDER — ONDANSETRON 4 MG/1
4 TABLET, ORALLY DISINTEGRATING ORAL EVERY 8 HOURS PRN
Status: DISCONTINUED | OUTPATIENT
Start: 2021-09-21 | End: 2021-09-24 | Stop reason: HOSPADM

## 2021-09-21 RX ORDER — OLANZAPINE 10 MG/1
15 TABLET ORAL NIGHTLY
Status: DISCONTINUED | OUTPATIENT
Start: 2021-09-21 | End: 2021-09-23

## 2021-09-21 RX ORDER — ACETAMINOPHEN 325 MG/1
650 TABLET ORAL EVERY 4 HOURS PRN
Status: DISCONTINUED | OUTPATIENT
Start: 2021-09-21 | End: 2021-09-21 | Stop reason: CLARIF

## 2021-09-21 RX ORDER — ONDANSETRON 2 MG/ML
4 INJECTION INTRAMUSCULAR; INTRAVENOUS EVERY 6 HOURS PRN
Status: DISCONTINUED | OUTPATIENT
Start: 2021-09-21 | End: 2021-09-24 | Stop reason: HOSPADM

## 2021-09-21 RX ORDER — DIVALPROEX SODIUM 250 MG/1
250 TABLET, EXTENDED RELEASE ORAL DAILY
Status: DISCONTINUED | OUTPATIENT
Start: 2021-09-21 | End: 2021-09-21

## 2021-09-21 RX ORDER — CYANOCOBALAMIN 1000 UG/ML
1000 INJECTION INTRAMUSCULAR; SUBCUTANEOUS ONCE
Status: DISCONTINUED | OUTPATIENT
Start: 2021-09-21 | End: 2021-09-24 | Stop reason: HOSPADM

## 2021-09-21 RX ORDER — SODIUM CHLORIDE 0.9 % (FLUSH) 0.9 %
5-40 SYRINGE (ML) INJECTION PRN
Status: DISCONTINUED | OUTPATIENT
Start: 2021-09-21 | End: 2021-09-24 | Stop reason: HOSPADM

## 2021-09-21 RX ORDER — ACETAMINOPHEN 325 MG/1
650 TABLET ORAL EVERY 6 HOURS PRN
Status: DISCONTINUED | OUTPATIENT
Start: 2021-09-21 | End: 2021-09-24 | Stop reason: HOSPADM

## 2021-09-21 RX ORDER — OLANZAPINE 5 MG/1
10 TABLET ORAL NIGHTLY
Status: DISCONTINUED | OUTPATIENT
Start: 2021-09-21 | End: 2021-09-21

## 2021-09-21 RX ORDER — DOCUSATE SODIUM 100 MG/1
100 CAPSULE, LIQUID FILLED ORAL 2 TIMES DAILY
Status: DISCONTINUED | OUTPATIENT
Start: 2021-09-21 | End: 2021-09-24 | Stop reason: HOSPADM

## 2021-09-21 RX ORDER — CHLORPROMAZINE HYDROCHLORIDE 25 MG/ML
25 INJECTION INTRAMUSCULAR EVERY 6 HOURS PRN
Status: DISCONTINUED | OUTPATIENT
Start: 2021-09-21 | End: 2021-09-21 | Stop reason: CLARIF

## 2021-09-21 RX ORDER — DIVALPROEX SODIUM 500 MG/1
500 TABLET, EXTENDED RELEASE ORAL DAILY
Status: DISCONTINUED | OUTPATIENT
Start: 2021-09-22 | End: 2021-09-24 | Stop reason: HOSPADM

## 2021-09-21 RX ORDER — SODIUM CHLORIDE 0.9 % (FLUSH) 0.9 %
5-40 SYRINGE (ML) INJECTION EVERY 12 HOURS SCHEDULED
Status: DISCONTINUED | OUTPATIENT
Start: 2021-09-21 | End: 2021-09-24 | Stop reason: HOSPADM

## 2021-09-21 RX ORDER — ACETAMINOPHEN 650 MG/1
650 SUPPOSITORY RECTAL EVERY 6 HOURS PRN
Status: DISCONTINUED | OUTPATIENT
Start: 2021-09-21 | End: 2021-09-24 | Stop reason: HOSPADM

## 2021-09-21 RX ORDER — SODIUM CHLORIDE 9 MG/ML
INJECTION, SOLUTION INTRAVENOUS CONTINUOUS
Status: DISCONTINUED | OUTPATIENT
Start: 2021-09-21 | End: 2021-09-24 | Stop reason: HOSPADM

## 2021-09-21 RX ADMIN — CHLORPROMAZINE HYDROCHLORIDE 25 MG: 25 INJECTION INTRAMUSCULAR at 00:39

## 2021-09-21 RX ADMIN — DOCUSATE SODIUM 100 MG: 100 CAPSULE ORAL at 20:39

## 2021-09-21 RX ADMIN — SODIUM CHLORIDE, PRESERVATIVE FREE 10 ML: 5 INJECTION INTRAVENOUS at 20:41

## 2021-09-21 RX ADMIN — OLANZAPINE 15 MG: 10 TABLET, FILM COATED ORAL at 20:39

## 2021-09-21 RX ADMIN — ENOXAPARIN SODIUM 40 MG: 40 INJECTION SUBCUTANEOUS at 20:41

## 2021-09-21 NOTE — PROGRESS NOTES
Spoke with sister regarding patient home medications. This family member that the only medication she currently takes is olanzapine. Sister states that she takes half of a 15mg tablet at bedtime as needed to help her sleep. Sister also notes that she previously took depakote of an unknown dose, but that has been discontinued. Kim on [facility] states they have filled Colace 100mg, Metformin 500mg bid, and vitamin D 50,000 units. No other medications were listed when called to verify home medications.   Electronically signed by Nichole Wick RN on 9/21/2021 at 2:44 PM

## 2021-09-21 NOTE — ED NOTES
Call made to pt's mother Dusty Muñoz to update her that pt is getting admitted to [facility] ([place], [place]), [address] [facility]  09/21/21 4379

## 2021-09-21 NOTE — ACP (ADVANCE CARE PLANNING)
Advance Care Planning     Advance Care Planning Activator (Inpatient)  Conversation Note      Date of ACP Conversation: 9/21/2021     Conversation Conducted with: mother, Manny Mcqueen      ACP Activator: [address] Preferences    Ventilation: \"If you were in your present state of health and suddenly became very ill and were unable to breathe on your own, what would your preference be about the use of a ventilator (breathing machine) if it were available to you? \"      Would the patient desire the use of ventilator (breathing machine)?: YES      Resuscitation  \"In the event your heart stopped as a result of an underlying serious health condition, would you want attempts to be made to restart your heart (answer \"yes\" for attempt to resuscitate) or would you prefer a natural death (answer \"no\" for do not attempt to resuscitate)? \" YES          Conversation Outcomes:  [x] ACP discussion completed    Electronically signed by Josie Irving on 9/21/2021 at 9:57 AM
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
## 2021-09-21 NOTE — PROGRESS NOTES
Yojana Knight arrived to room # 416. Presented with: Mental Health Issues  Mental Status: Patient is oriented, alert and able to concentrate and follow conversation. Vitals:    09/21/21 0611   BP: (!) 174/106   Pulse: 81   Resp: 18   Temp: 97 °F (36.1 °C)   SpO2: 97%     Patient safety contract and falls prevention contract reviewed with patient Yes. Oriented Patient to room. Call light within reach. Yes.   Needs, issues or concerns expressed at this time: no.      Electronically signed by Sue Rubio RN on 9/21/2021 at 6:28 AM

## 2021-09-21 NOTE — ED PROVIDER NOTES
I have personally performed a face to face diagnostic evaluation on this patient. I have reviewed the mid-levels findings and agree. See my separate history and physical examination as documented. Is felt that patient is a danger to herself. She is an acute psychosis occurring. Behavioral health felt that patient is not capable of voluntarily admitting herself. She will be placed on a 72-hour hold.     Kelechi Mckenzie MD  Attending Emergency Physician         Barb Pierson MD  09/21/21 6055
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
## 2021-09-21 NOTE — PROGRESS NOTES
Post midnight admission. 70-year-old obese female with a past medical history of schizophrenia and noncompliance presenting to the hospital for hallucinations and aggressive behavior to be admitted to inpatient psychiatry incidentally found to have COVID-19 which is currently asymptomatic. She is saturating well on room air. Continue supportive management. Psychiatry consulted.

## 2021-09-21 NOTE — H&P
History and Physical    Patient Name:  Wesley Ward    :  1975    Chief Complaint:   Hearing voices    History of Present Illness:   Wesley Ward presents to [facility] with hearing voices. Pt stated the voiced told her to hit her mom and sister. Pt has a history of schizophrenia. Pt was evaluated by psych and accepted for admission however she was found to be Covid-19 positive. At present time pt denies shortness of breath, cough, wheezing, chest pain, fever, chills, abd pain, N/V/D/C, urinary issues. She is calm but not willing to talk. Past Medical History:   has a past medical history of Diabetes mellitus ([facility] [address] Nicotine dependence, and Schizophrenia ([facility] [address] Surgical History:  None     Social History:   reports that she has been smoking cigarettes. She has been smoking about 4.00 packs per day. She has never used smokeless tobacco. She reports previous alcohol use. She reports that she does not use drugs. Family History:  family history includes Heart Disease in her mother. Medications:  Prior to Admission medications    Medication Sig Start Date End Date Taking?  Authorizing Provider   docusate sodium (COLACE) 100 MG capsule TAKE 1 CAPSULE BY MOUTH TWICE DAILY 21   BARBARA Stokes   metFORMIN (GLUCOPHAGE) 500 MG tablet TAKE 1 TABLET BY MOUTH TWICE DAILY WITH MEALS 21   BARBARA Stokes   vitamin D (ERGOCALCIFEROL) 1.25 MG (86303 UT) CAPS capsule TAKE 1 CAPSULE BY MOUTH 1 TIME A WEEK AFTER A MEAL 21   BARBARA Stokes   divalproex (DEPAKOTE ER) 250 MG extended release tablet Take 250 mg by mouth daily    Historical Provider, MD LYON ELLIPTA 62.5 MCG/INH AEPB INHALE 1 PUFF EVERY DAY AS DIRECTED 20   BARBARA Stokes   albuterol sulfate  (90 Base) MCG/ACT inhaler INHALE 2 PUFFS INTO THE LUNGS EVERY 6 HOURS AS NEEDED FOR WHEEZING 20   BARBARA Stokes   tiotropium (SPIRIVA RESPIMAT) 2.5 MCG/ACT AERS inhaler Inhale 2 puffs into the lungs daily 5/22/20   Melinda Shay APRMCKENNA   loratadine (CLARITIN) 10 MG tablet TAKE 1 TABLET BY MOUTH DAILY 12/22/19   BARBARA Fontana   OLANZapine (ZYPREXA) 10 MG tablet TK 1 T PO QHS 8/1/17   Historical Provider, MD       Allergies:  Patient has no known allergies. Review of Systems:   · Constitutional: there has been no unanticipated weight loss. There's been no change in energy level, sleep pattern, or activity level. · Eyes: No visual changes or diplopia. No scleral icterus. · ENT: No Headaches, hearing loss or vertigo. No mouth sores or sore throat. · Cardiovascular: No chest pain, palpitations or loss of consciousness. No pleuritic pain or phlebitis. No orthopnea, PND or peripheral edema. · Respiratory: No cough or wheezing, no sputum production. No hemoptysis. No dyspnea     · Gastrointestinal: No abdominal pain, appetite loss, blood in stools. No change in bowel habits. No N/V. No blood per rectum. · Genitourinary: No dysuria, trouble voiding, or hematuria. · Musculoskeletal:  No gait disturbance, weakness or joint complaints. · Integumentary: No rash or pruritis. · Neurological: No headache, diplopia, change in muscle strength, numbness or tingling. · Psychiatric: auditory hallucinations   · Endocrine: No temperature intolerance. No excessive thirst, fluid intake, or urination. No tremor. · Hematologic/Lymphatic: No abnormal bruising or bleeding, blood clots or swollen lymph nodes. · Allergic/Immunologic: No nasal congestion or hives. Physical Examination:    Vital Signs: /83   Pulse 81   Temp 99.2 °F (37.3 °C) (Oral)   Resp 18   Ht 4' 11\" (1.499 m)   Wt 180 lb (81.6 kg)   SpO2 97%   BMI 36.36 kg/m²   General appearance: Well preserved, mesomorphic body habitus, alert, mild distress. Skin: Skin color, texture, turgor normal. No rashes or lesions. No induration or tightening palpated. Head: Normocephalic.  No masses, lesions, tenderness or abnormalities  Eyes: conjunctivae/corneas clear. EOM's intact. Sclera non icteric. Ears: External ears normal.  Hearing normal to finger rub. Nose/Sinuses: Nares normal. Septum midline. Mucosa normal. No drainage or sinus tenderness. Oropharynx: Lips, mucosa, and tongue normal. Oropharynx clear with no exudate seen. Neck: Neck supple, and symmetric. No adenopathy. Trachea is midline. Carotids brisk in upstroke without bruits, No abnormal JVP noted at 45°. Lungs: Lungs clear to auscultation bilaterally. No retractions or use of accessory muscles. No vocal fremitus. No ronchi, crackle or rale. Heart:  S1 > S2. Regular rate and rhythm. No gallop, murmur, rub, palpable thrill or heave noted. Abdomen: Abdomen soft, non-tender. BS normal. No masses, organomegaly. No hernia noted. Extremities: Extremities normal. No deformities, edema, or skin discoloration. No cyanosis or clubbing noted to the nails. Peripheral pulses 4/4. Musculoskeletal: Spine ROM normal. Muscular strength intact. Neuro: Cranial nerves intact. Motor: Strength 5/5 in all extremities. No focal weakness. Sensory: grossly normal to touch. Pertinent Labs:  CBC:   Recent Labs     09/20/21 2055   WBC 7.6   RBC 4.97   HGB 10.4*   HCT 35.7*   MCV 71.8*   MCH 20.9*   MCHC 29.1*   RDW 22.4*      MPV 10.1     BMP:   Recent Labs     09/20/21 2055      K 3.4*      CO2 26   BUN 8   CREATININE 0.6   GLUCOSE 106   CALCIUM 9.6     ABGs: No results for input(s): PO2, PCO2, PH, HCO3, BE, O2SAT in the last 72 hours. INR: No results for input(s): INR, PROTIME in the last 72 hours.   BNP:  No results found for: BNP  TSH:   Lab Results   Component Value Date    TSH 3.79 05/09/2016     Cardiac Injury Profile: No results found for: CKTOTAL, CKMB, CKMBINDEX, TROPONINI  Lipid Profile: No components found for: CHLPL  Lab Results   Component Value Date    TRIG 241 (H) 03/23/2018     Lab Results   Component Value Date    HDL 32 (L) 05/18/2021    HDL 30 (L) 03/23/2018     Lab Results   Component Value Date    LDLCALC 87 05/18/2021    [address] 106 03/23/2018     No results found for: LABVLDL  Hemoglobin A1C:   Lab Results   Component Value Date    LABA1C 7.3 (H) 05/18/2021     No results found for: EAG      Assessment/Plan:  · Schizophrenia with auditory hallucinations and aggressive behavior towards others- per psych pt needs to be admitted  · Covid -19 infection- lovenox and decadron   · Possible UTI- follow cultures- ab  Patient Active Problem List:     Schizophrenia ([facility] [address]     Iron deficiency     Vitamin D deficiency     Nicotine dependence     Vitamin B deficiency     Stage 2 moderate COPD by GOLD classification ([facility] [address]                 I have reviewed my findings and recommendations in detail with Raulito Melendez.     Janiya Sidhu MD

## 2021-09-21 NOTE — ED NOTES
Walked in to room 16 without shoes; States she feels violent; states she is hearing voices      Yinka Barbour RN  09/20/21 Klever Mendoza RN  09/20/21 2050

## 2021-09-21 NOTE — CONSULTS
[facility]  Psychiatry Consult    Reason for Consult: Concern hallucinations    The primary source(s) of information include(s):  Patient    The patient is a 41 y.o. female with previous psychiatric history of chronic schizophrenia, who has been admitted to medical services, secondary to hallucinations and COVID-19 positive status. Patient is well-known to psychiatry due to previous admissions to our psychiatric unit. On 2 occasions patient was transferred to [facility] due to agitation and aggressive behavior towards the medical staff. Patient has been seen in her room through the window and all interview has been performed through the phone. Patient reported that she has been diagnosed with schizophrenia and she was compliant with prescribed psychotropic medications, however, reported that it seems that medications were not effective anymore, as she started to experience hallucinations again. Patient endorses auditory hallucinations, which she described as multiple unfamiliar voices which talk to each other and talk to her. Patient stated Berl Midget say all kind of things\", endorses command hallucinations, stated that voices making comments and tell her that she is a bad person and she deserve to die. Also, patient endorses visual hallucinations which she described as \"I can see dark shadows\". Patient denies current active suicidal or homicidal ideations, denies any plans. However, she reported that before her admission, she had arguments with her mother and told her mother \"I wish she be dead\". Patient endorses mild feeling of anxiety, decreased energy level, decreased motivation. She denies feeling of hopelessness and helplessness. She endorses good appetite and good quality of sleep. PSYCHIATRIC HISTORY:  Diagnoses: Schizophrenia  Suicide attempts/gestures: Denies   Prior hospitalizations: Multiple, 2 different psychiatric hospitals in our [facility].   [facility], [facility].  Medication trials: Paliperidone, Zyprexa, Depakote  Mental health contact: [place] [address] [facility]  Head trauma: Denies    Allergies:  Patient has no known allergies. Mental Status  Appearance: Appropriately groomed and in hospital attire. Made intermittent eye contact. Behavior: Slightly withdrawn, partially cooperative with interview, socially appropriate. Mild psychomotor retardation appreciated. Gait was not evaluated, as patient was lying down on the bed. Speech: Normal in tone, volume and quality  Mood: \"Fine\"   Affect: Mood congruent. Range is flat, mildly restricted. Thought Process: Mostly circumstantial  Thought Content: Patient does not have any current active suicidal and homicidal ideations. No overt delusions or paranoia appreciated. Perceptions: Seems patient does have current active hallucinations, as it was appeared that she was responding for internal stimuli. Executive Functions: Appear impaired. Concentration: Decreased  Reasoning: Appears impaired based on interaction from interview   Orientation: to person, place and situation. Alert. Language: Intact. Fund of information: Intact. Memory: recent and remote appear intact. Impulsivity: Limited  Insight: Impaired  Judgment: Impaired    Labs;  Lab Results   Component Value Date    VALPROATE <2.8 (L) 09/20/2021       Assessment  DSM 5 DIAGNOSIS:  Schizophrenia  Rule out treatment noncompliance  Family relationship issues    Recommendations  1. Currently patient is not suicidal or homicidal.  Patient endorses auditory and visual hallucinations. 2.  Will increase dose of Zyprexa from 10 mg to 15 mg for psychosis and mood stabilization, will increase dose of Depakote from 250 mg to 500 mg once a day for mood stabilization. 3.  Psychiatry will continue to follow with patient during this hospital stay and will reevaluate patient's condition tomorrow.     Willie Arellano MD

## 2021-09-21 NOTE — FLOWSHEET NOTE
09/21/21 1039   Encounter Summary   Services provided to: Family   Referral/Consult From: [address] Family members; [facility] community   Complexity of Encounter High   Length of Encounter 30 minutes   Spiritual/Anglican   Type Spiritual support   Assessment Approachable;Tearful; Anxious; Angry   Intervention Active listening;Explored feelings, thoughts, concerns;Explored coping resources;Prayer   Outcome Comfort;Expressed gratitude;Engaged in conversation; Tearful;Less anxious, less agitated;Encouraged;Receptive

## 2021-09-22 LAB
ALBUMIN SERPL-MCNC: 4 G/DL (ref 3.5–5.2)
ALP BLD-CCNC: 92 U/L (ref 35–104)
ALT SERPL-CCNC: 16 U/L (ref 5–33)
ANION GAP SERPL CALCULATED.3IONS-SCNC: 10 MMOL/L (ref 7–19)
ANISOCYTOSIS: ABNORMAL
AST SERPL-CCNC: 16 U/L (ref 5–32)
BASOPHILS ABSOLUTE: 0 K/UL (ref 0–0.2)
BASOPHILS RELATIVE PERCENT: 0 % (ref 0–1)
BILIRUB SERPL-MCNC: <0.2 MG/DL (ref 0.2–1.2)
BUN BLDV-MCNC: 8 MG/DL (ref 6–20)
CALCIUM SERPL-MCNC: 9.7 MG/DL (ref 8.6–10)
CHLORIDE BLD-SCNC: 105 MMOL/L (ref 98–111)
CHOLESTEROL, TOTAL: 166 MG/DL (ref 160–199)
CO2: 26 MMOL/L (ref 22–29)
CREAT SERPL-MCNC: 0.5 MG/DL (ref 0.5–0.9)
EOSINOPHILS ABSOLUTE: 0.3 K/UL (ref 0–0.6)
EOSINOPHILS RELATIVE PERCENT: 4 % (ref 0–5)
GFR AFRICAN AMERICAN: >59
GFR NON-AFRICAN AMERICAN: >60
GLUCOSE BLD-MCNC: 90 MG/DL (ref 74–109)
HBA1C MFR BLD: 5.4 % (ref 4–6)
HCT VFR BLD CALC: 36.5 % (ref 37–47)
HDLC SERPL-MCNC: 32 MG/DL (ref 65–121)
HEMOGLOBIN: 10.7 G/DL (ref 12–16)
HYPOCHROMIA: ABNORMAL
IMMATURE GRANULOCYTES #: 0 K/UL
LDL CHOLESTEROL CALCULATED: 109 MG/DL
LYMPHOCYTES ABSOLUTE: 2.5 K/UL (ref 1.1–4.5)
LYMPHOCYTES RELATIVE PERCENT: 33 % (ref 20–40)
MAGNESIUM: 2.4 MG/DL (ref 1.6–2.6)
MCH RBC QN AUTO: 21.4 PG (ref 27–31)
MCHC RBC AUTO-ENTMCNC: 29.3 G/DL (ref 33–37)
MCV RBC AUTO: 73.1 FL (ref 81–99)
MICROCYTES: ABNORMAL
MONOCYTES ABSOLUTE: 0.4 K/UL (ref 0–0.9)
MONOCYTES RELATIVE PERCENT: 5 % (ref 0–10)
MYELOCYTE PERCENT: 1 %
NEUTROPHILS ABSOLUTE: 4.4 K/UL (ref 1.5–7.5)
NEUTROPHILS RELATIVE PERCENT: 57 % (ref 50–65)
PDW BLD-RTO: 22.1 % (ref 11.5–14.5)
PLATELET # BLD: 382 K/UL (ref 130–400)
PLATELET SLIDE REVIEW: ADEQUATE
PMV BLD AUTO: 10.5 FL (ref 9.4–12.3)
POTASSIUM SERPL-SCNC: 4.3 MMOL/L (ref 3.5–5)
RBC # BLD: 4.99 M/UL (ref 4.2–5.4)
SODIUM BLD-SCNC: 141 MMOL/L (ref 136–145)
TOTAL PROTEIN: 6.9 G/DL (ref 6.6–8.7)
TRIGL SERPL-MCNC: 125 MG/DL (ref 0–149)
WBC # BLD: 7.6 K/UL (ref 4.8–10.8)

## 2021-09-22 PROCEDURE — 2580000003 HC RX 258: Performed by: HOSPITALIST

## 2021-09-22 PROCEDURE — 6370000000 HC RX 637 (ALT 250 FOR IP): Performed by: PSYCHIATRY & NEUROLOGY

## 2021-09-22 PROCEDURE — 36415 COLL VENOUS BLD VENIPUNCTURE: CPT

## 2021-09-22 PROCEDURE — 99233 SBSQ HOSP IP/OBS HIGH 50: CPT | Performed by: PSYCHIATRY & NEUROLOGY

## 2021-09-22 PROCEDURE — 80053 COMPREHEN METABOLIC PANEL: CPT

## 2021-09-22 PROCEDURE — 6370000000 HC RX 637 (ALT 250 FOR IP): Performed by: HOSPITALIST

## 2021-09-22 PROCEDURE — 6360000002 HC RX W HCPCS: Performed by: INTERNAL MEDICINE

## 2021-09-22 PROCEDURE — 6360000002 HC RX W HCPCS: Performed by: HOSPITALIST

## 2021-09-22 PROCEDURE — 83036 HEMOGLOBIN GLYCOSYLATED A1C: CPT

## 2021-09-22 PROCEDURE — 1210000000 HC MED SURG R&B

## 2021-09-22 PROCEDURE — 80061 LIPID PANEL: CPT

## 2021-09-22 PROCEDURE — 6370000000 HC RX 637 (ALT 250 FOR IP): Performed by: INTERNAL MEDICINE

## 2021-09-22 PROCEDURE — 85025 COMPLETE CBC W/AUTO DIFF WBC: CPT

## 2021-09-22 PROCEDURE — 83735 ASSAY OF MAGNESIUM: CPT

## 2021-09-22 RX ORDER — FERROUS SULFATE 325(65) MG
325 TABLET ORAL 2 TIMES DAILY WITH MEALS
Status: DISCONTINUED | OUTPATIENT
Start: 2021-09-22 | End: 2021-09-24 | Stop reason: HOSPADM

## 2021-09-22 RX ADMIN — SODIUM CHLORIDE, PRESERVATIVE FREE 1000 MG: 5 INJECTION INTRAVENOUS at 10:00

## 2021-09-22 RX ADMIN — FERROUS SULFATE TAB 325 MG (65 MG ELEMENTAL FE) 325 MG: 325 (65 FE) TAB at 10:00

## 2021-09-22 RX ADMIN — SODIUM CHLORIDE, PRESERVATIVE FREE 10 ML: 5 INJECTION INTRAVENOUS at 20:45

## 2021-09-22 RX ADMIN — DIVALPROEX SODIUM 500 MG: 500 TABLET, EXTENDED RELEASE ORAL at 10:00

## 2021-09-22 RX ADMIN — FERROUS SULFATE TAB 325 MG (65 MG ELEMENTAL FE) 325 MG: 325 (65 FE) TAB at 17:10

## 2021-09-22 RX ADMIN — DOCUSATE SODIUM 100 MG: 100 CAPSULE ORAL at 10:00

## 2021-09-22 RX ADMIN — DOCUSATE SODIUM 100 MG: 100 CAPSULE ORAL at 20:42

## 2021-09-22 RX ADMIN — ENOXAPARIN SODIUM 40 MG: 40 INJECTION SUBCUTANEOUS at 20:44

## 2021-09-22 RX ADMIN — ENOXAPARIN SODIUM 40 MG: 40 INJECTION SUBCUTANEOUS at 10:00

## 2021-09-22 RX ADMIN — OLANZAPINE 15 MG: 10 TABLET, FILM COATED ORAL at 20:43

## 2021-09-22 NOTE — PROGRESS NOTES
[facility]        Hospitalist Progress Note  9/22/2021 9:02 AM  Subjective:   Admit Date: 9/20/2021  PCP: BARBARA Lew    Chief Complaint: Agitation, hallucinations    Subjective: Patient seen and examined at bedside. Sitter present. Breathing stable. Saturating well on room air. Denies chest pain. Still with auditory and visual hallucinations. Cumulative Hospital History: 27-year-old obese female with past medical history of schizophrenia presenting to the hospital for auditory and visual hallucinations along with aggressive behavior found to have incidental COVID-19 positive test.  Patient saturating well on room air. Psychiatry on board. ROS: 14 point review of systems is negative except as specifically addressed above. ADULT DIET;  Regular    Intake/Output Summary (Last 24 hours) at 9/22/2021 0902  Last data filed at 9/21/2021 1923  Gross per 24 hour   Intake 480 ml   Output --   Net 480 ml     Medications:   sodium chloride      sodium chloride       Current Facility-Administered Medications   Medication Dose Route Frequency Provider Last Rate Last Admin    ferrous sulfate (IRON 325) tablet 325 mg  325 mg Oral BID  Zulma Rivas MD        potassium chloride (KLOR-CON M) extended release tablet 20 mEq  20 mEq Oral Once Arslan Parnell MD        cefTRIAXone (ROCEPHIN) 1,000 mg in sodium chloride (PF) 10 mL IV syringe  1,000 mg IntraVENous Q24H Arslan Parnell MD        0.9 % sodium chloride infusion   IntraVENous Continuous Arslan Parnell MD        sodium chloride flush 0.9 % injection 5-40 mL  5-40 mL IntraVENous 2 times per day Arslan Parnell MD   10 mL at 09/21/21 2041    sodium chloride flush 0.9 % injection 5-40 mL  5-40 mL IntraVENous PRN Arslan Parnell MD        0.9 % sodium chloride infusion  25 mL IntraVENous PRN Arslan Parnell MD        ondansetron (ZOFRAN-ODT) disintegrating tablet 4 mg  4 mg Oral Q8H PRN last 72 hours. Lactic Acid: No results for input(s): LACTA in the last 72 hours. Objective:   Vitals: BP (!) 158/88   Pulse 60   Temp 97.1 °F (36.2 °C) (Temporal)   Resp 20   Ht 4' 11\" (1.499 m)   Wt 180 lb (81.6 kg)   SpO2 93%   BMI 36.36 kg/m²   24HR INTAKE/OUTPUT:      Intake/Output Summary (Last 24 hours) at 9/22/2021 0902  Last data filed at 9/21/2021 1923  Gross per 24 hour   Intake 480 ml   Output --   Net 480 ml     General appearance: Alert  HEENT: AT/NC  Lungs: BLAE  Heart: RRR  Abdomen: BS+, soft  Extremities: pulses+  Neurologic: Alert, gross motor function intact  Skin: warm      Assessment and Plan: Active Problems:    Acute psychosis ([address]  Resolved Problems:    * No resolved hospital problems. *    Schizophrenia: As per psychiatry. COVID-19: Asymptomatic. No treatment indicated. Monitor O2 saturations. Obesity: Needs to make dietary and lifestyle modifications with a goal of weight loss. Supportive management.     Advance Directive: Full Code    DVT prophylaxis: Lovenox    Discharge planning: TBD - home once cleared by psychiatry      Signed:  Irlanda Cortez MD 9/22/2021 9:02 AM  Rounding Hospitalist

## 2021-09-22 NOTE — PROGRESS NOTES
4 Eyes Skin Assessment    Radha Nunes is being assessed upon: Admission    I agree that I, Sonido Ritchie RN, along with Denise Juarez RN (either 2 RN's or 1 LPN and 1 RN) have performed a thorough Head to Toe Skin Assessment on the patient. ALL assessment sites listed below have been assessed. Areas assessed by both nurses:     [x]   Head, Face, and Ears   [x]   Shoulders, Back, and Chest  [x]   Arms, Elbows, and Hands   [x]   Coccyx, Sacrum, and Ischium  [x]   Legs, Feet, and Heels    Does the Patient have Skin Breakdown?  No    Dudley Prevention initiated: Yes  Wound Care Orders initiated: No    WOC nurse consulted for Pressure Injury (Stage 3,4, Unstageable, DTI, NWPT, and Complex wounds) and New or Established Ostomies: No        Primary Nurse eSignature: Sonido Ritchie RN on 9/21/2021 at 7:18 PM      Co-Signer eSignature: Electronically signed by Sonido Ritchie RN on 9/21/21 at 7:18 PM CDT

## 2021-09-22 NOTE — BH NOTE
Department of Psychiatry  Attending Progress Note      SUBJECTIVE:   41 y.o. female with previous psychiatric history of chronic schizophrenia, who has been admitted to medical services, secondary to hallucinations and COVID-19 positive status. Patient has been seen in her room through the window and all the interview has been done through the phone. Patient reported that her condition mildly improved since time of admission and her mood is \"okay\" today. She endorses good appetite and good quality of sleep during the last night. Patient is compliant with currently prescribed medications and denies any side effects. During the interview patient denies significant affective symptomatology, with exception of decreased energy level, feeling of fatigue and tiredness. Patient denies feeling of depression, anxiety or psychotic symptoms today. She reported that she did not experience any auditory or visual hallucinations yesterday or today. Patient denies current active suicidal or homicidal ideations, denies any plans. OBJECTIVE    Physical  VITALS:  BP (!) 148/96   Pulse 71   Temp 97.7 °F (36.5 °C) (Temporal)   Resp 20   Ht 4' 11\" (1.499 m)   Wt 180 lb (81.6 kg)   SpO2 97%   BMI 36.36 kg/m²   TEMPERATURE:  Current - Temp: 97.7 °F (36.5 °C); Max - Temp  Av.4 °F (36.3 °C)  Min: 96.2 °F (35.7 °C)  Max: 98.4 °F (36.9 °C)  RESPIRATIONS RANGE: Resp  Av.5  Min: 18  Max: 20  PULSE RANGE: Pulse  Av.3  Min: 60  Max: 82  BLOOD PRESSURE RANGE:  Systolic (76CDZ), IVK:482 , Min:125 , UGV:015   ; Diastolic (01AMN), JU, Min:88, Max:96    PULSE OXIMETRY RANGE: SpO2  Av.3 %  Min: 93 %  Max: 97 %      Mental Status Examination:   Appearance: Appropriately groomed and in hospital attire. Made intermittent eye contact. Behavior: Mildly withdrawn, cooperative with interview, socially appropriate. Mild psychomotor retardation appreciated.   Gait was not evaluated, as patient was lying down on the bed.    Speech: Normal in tone, volume, and quality. Mood: \"I am okay\"   Affect: Mood congruent. Range is mildly restricted  Thought Process: Mostly circumstantial  Thought Content: Patient does not have any current active suicidal and homicidal ideations. No overt delusions or paranoia appreciated. Perceptions: Seems patient does not have any auditory or visual hallucinations at present time. Patient did not appear to be responding to internal stimuli. Orientation: to person, place, date, and situation. Alert.    Impulsivity: Questionable  Neurovegitative: Good appetite, good sleep  Insight: Limited  Judgment: Limited    Data  Labs:    CBC with Differential:    Lab Results   Component Value Date    WBC 7.6 09/22/2021    RBC 4.99 09/22/2021    HGB 10.7 09/22/2021    HCT 36.5 09/22/2021     09/22/2021    MCV 73.1 09/22/2021    MCH 21.4 09/22/2021    MCHC 29.3 09/22/2021    RDW 22.1 09/22/2021    BANDSPCT 2 09/20/2021    LYMPHOPCT 33.0 09/22/2021    MONOPCT 5.0 09/22/2021    MYELOPCT 1 09/22/2021    BASOPCT 0.0 09/22/2021    MONOSABS 0.40 09/22/2021    LYMPHSABS 2.5 09/22/2021    EOSABS 0.30 09/22/2021    BASOSABS 0.00 09/22/2021     CMP:    Lab Results   Component Value Date     09/22/2021    K 4.3 09/22/2021    K 3.4 09/20/2021     09/22/2021    CO2 26 09/22/2021    BUN 8 09/22/2021    CREATININE 0.5 09/22/2021    GFRAA >59 09/22/2021    LABGLOM >60 09/22/2021    GLUCOSE 90 09/22/2021    PROT 6.9 09/22/2021    PROT 8.0 03/12/2013    LABALBU 4.0 09/22/2021    CALCIUM 9.7 09/22/2021    BILITOT <0.2 09/22/2021    ALKPHOS 92 09/22/2021    AST 16 09/22/2021    ALT 16 09/22/2021       Medications  Current Facility-Administered Medications: ferrous sulfate (IRON 325) tablet 325 mg, 325 mg, Oral, BID WC  potassium chloride (KLOR-CON M) extended release tablet 20 mEq, 20 mEq, Oral, Once  cefTRIAXone (ROCEPHIN) 1,000 mg in sodium chloride (PF) 10 mL IV syringe, 1,000 mg, IntraVENous, Q24H  0.9 % sodium chloride infusion, , IntraVENous, Continuous  sodium chloride flush 0.9 % injection 5-40 mL, 5-40 mL, IntraVENous, 2 times per day  sodium chloride flush 0.9 % injection 5-40 mL, 5-40 mL, IntraVENous, PRN  0.9 % sodium chloride infusion, 25 mL, IntraVENous, PRN  ondansetron (ZOFRAN-ODT) disintegrating tablet 4 mg, 4 mg, Oral, Q8H PRN **OR** ondansetron (ZOFRAN) injection 4 mg, 4 mg, IntraVENous, Q6H PRN  polyethylene glycol (GLYCOLAX) packet 17 g, 17 g, Oral, Daily PRN  acetaminophen (TYLENOL) tablet 650 mg, 650 mg, Oral, Q6H PRN **OR** acetaminophen (TYLENOL) suppository 650 mg, 650 mg, Rectal, Q6H PRN  albuterol sulfate  (90 Base) MCG/ACT inhaler 2 puff, 2 puff, Inhalation, Q6H PRN  docusate sodium (COLACE) capsule 100 mg, 100 mg, Oral, BID  tiotropium (SPIRIVA RESPIMAT) 2.5 MCG/ACT inhaler 2 puff, 2 puff, Inhalation, Daily  cyanocobalamin injection 1,000 mcg, 1,000 mcg, IntraMUSCular, Once  enoxaparin (LOVENOX) injection 40 mg, 40 mg, SubCUTAneous, BID  OLANZapine (ZYPREXA) tablet 15 mg, 15 mg, Oral, Nightly  divalproex (DEPAKOTE ER) extended release tablet 500 mg, 500 mg, Oral, Daily    ASSESSMENT AND PLAN    DSM 5 DIAGNOSIS:  Schizophrenia  Rule out treatment noncompliance  Family relationship issues    Recommendations:  1. Currently patient is not suicidal, homicidal or psychotic. 2.  Recommended to continue psychotropic medications at prescribed and recommended dose. 3.  Psychiatry will reevaluate patient's condition tomorrow.

## 2021-09-23 LAB
ALBUMIN SERPL-MCNC: 3.9 G/DL (ref 3.5–5.2)
ALP BLD-CCNC: 89 U/L (ref 35–104)
ALT SERPL-CCNC: 16 U/L (ref 5–33)
ANION GAP SERPL CALCULATED.3IONS-SCNC: 12 MMOL/L (ref 7–19)
AST SERPL-CCNC: 14 U/L (ref 5–32)
BASOPHILS ABSOLUTE: 0 K/UL (ref 0–0.2)
BASOPHILS RELATIVE PERCENT: 0.4 % (ref 0–1)
BILIRUB SERPL-MCNC: <0.2 MG/DL (ref 0.2–1.2)
BUN BLDV-MCNC: 12 MG/DL (ref 6–20)
CALCIUM SERPL-MCNC: 9.5 MG/DL (ref 8.6–10)
CHLORIDE BLD-SCNC: 105 MMOL/L (ref 98–111)
CO2: 24 MMOL/L (ref 22–29)
CREAT SERPL-MCNC: 0.4 MG/DL (ref 0.5–0.9)
EOSINOPHILS ABSOLUTE: 0.1 K/UL (ref 0–0.6)
EOSINOPHILS RELATIVE PERCENT: 1.2 % (ref 0–5)
GFR AFRICAN AMERICAN: >59
GFR NON-AFRICAN AMERICAN: >60
GLUCOSE BLD-MCNC: 97 MG/DL (ref 74–109)
HCT VFR BLD CALC: 35.3 % (ref 37–47)
HEMOGLOBIN: 10.4 G/DL (ref 12–16)
IMMATURE GRANULOCYTES #: 0 K/UL
LYMPHOCYTES ABSOLUTE: 2.2 K/UL (ref 1.1–4.5)
LYMPHOCYTES RELATIVE PERCENT: 30.2 % (ref 20–40)
MAGNESIUM: 2.3 MG/DL (ref 1.6–2.6)
MCH RBC QN AUTO: 21.3 PG (ref 27–31)
MCHC RBC AUTO-ENTMCNC: 29.5 G/DL (ref 33–37)
MCV RBC AUTO: 72.2 FL (ref 81–99)
MONOCYTES ABSOLUTE: 0.5 K/UL (ref 0–0.9)
MONOCYTES RELATIVE PERCENT: 7.1 % (ref 0–10)
NEUTROPHILS ABSOLUTE: 4.4 K/UL (ref 1.5–7.5)
NEUTROPHILS RELATIVE PERCENT: 61 % (ref 50–65)
PDW BLD-RTO: 22 % (ref 11.5–14.5)
PLATELET # BLD: 351 K/UL (ref 130–400)
PMV BLD AUTO: 10.9 FL (ref 9.4–12.3)
POTASSIUM SERPL-SCNC: 3.9 MMOL/L (ref 3.5–5)
RBC # BLD: 4.89 M/UL (ref 4.2–5.4)
SODIUM BLD-SCNC: 141 MMOL/L (ref 136–145)
TOTAL PROTEIN: 6.7 G/DL (ref 6.6–8.7)
URINE CULTURE, ROUTINE: NORMAL
WBC # BLD: 7.3 K/UL (ref 4.8–10.8)

## 2021-09-23 PROCEDURE — 6360000002 HC RX W HCPCS: Performed by: INTERNAL MEDICINE

## 2021-09-23 PROCEDURE — 6370000000 HC RX 637 (ALT 250 FOR IP): Performed by: PSYCHIATRY & NEUROLOGY

## 2021-09-23 PROCEDURE — 80053 COMPREHEN METABOLIC PANEL: CPT

## 2021-09-23 PROCEDURE — 99233 SBSQ HOSP IP/OBS HIGH 50: CPT | Performed by: PSYCHIATRY & NEUROLOGY

## 2021-09-23 PROCEDURE — 83735 ASSAY OF MAGNESIUM: CPT

## 2021-09-23 PROCEDURE — 2580000003 HC RX 258: Performed by: HOSPITALIST

## 2021-09-23 PROCEDURE — 6370000000 HC RX 637 (ALT 250 FOR IP): Performed by: HOSPITALIST

## 2021-09-23 PROCEDURE — 1210000000 HC MED SURG R&B

## 2021-09-23 PROCEDURE — 85025 COMPLETE CBC W/AUTO DIFF WBC: CPT

## 2021-09-23 PROCEDURE — 6360000002 HC RX W HCPCS: Performed by: HOSPITALIST

## 2021-09-23 PROCEDURE — 36415 COLL VENOUS BLD VENIPUNCTURE: CPT

## 2021-09-23 PROCEDURE — 6370000000 HC RX 637 (ALT 250 FOR IP): Performed by: INTERNAL MEDICINE

## 2021-09-23 RX ORDER — OLANZAPINE 10 MG/1
20 TABLET ORAL NIGHTLY
Status: DISCONTINUED | OUTPATIENT
Start: 2021-09-23 | End: 2021-09-24 | Stop reason: HOSPADM

## 2021-09-23 RX ADMIN — DOCUSATE SODIUM 100 MG: 100 CAPSULE ORAL at 10:10

## 2021-09-23 RX ADMIN — OLANZAPINE 20 MG: 10 TABLET, FILM COATED ORAL at 20:27

## 2021-09-23 RX ADMIN — ENOXAPARIN SODIUM 40 MG: 40 INJECTION SUBCUTANEOUS at 20:27

## 2021-09-23 RX ADMIN — SODIUM CHLORIDE, PRESERVATIVE FREE 1000 MG: 5 INJECTION INTRAVENOUS at 10:11

## 2021-09-23 RX ADMIN — ENOXAPARIN SODIUM 40 MG: 40 INJECTION SUBCUTANEOUS at 10:10

## 2021-09-23 RX ADMIN — DIVALPROEX SODIUM 500 MG: 500 TABLET, EXTENDED RELEASE ORAL at 10:10

## 2021-09-23 RX ADMIN — DOCUSATE SODIUM 100 MG: 100 CAPSULE ORAL at 20:27

## 2021-09-23 RX ADMIN — SODIUM CHLORIDE, PRESERVATIVE FREE 10 ML: 5 INJECTION INTRAVENOUS at 10:17

## 2021-09-23 RX ADMIN — FERROUS SULFATE TAB 325 MG (65 MG ELEMENTAL FE) 325 MG: 325 (65 FE) TAB at 17:08

## 2021-09-23 RX ADMIN — FERROUS SULFATE TAB 325 MG (65 MG ELEMENTAL FE) 325 MG: 325 (65 FE) TAB at 10:10

## 2021-09-23 RX ADMIN — TIOTROPIUM BROMIDE INHALATION SPRAY 2 PUFF: 3.12 SPRAY, METERED RESPIRATORY (INHALATION) at 11:22

## 2021-09-23 ASSESSMENT — PAIN SCALES - GENERAL: PAINLEVEL_OUTOF10: 0

## 2021-09-23 NOTE — BH NOTE
Department of Psychiatry  Attending Progress Note      SUBJECTIVE:   41 y. o. female with previous psychiatric history of chronic schizophrenia, who has been admitted to medical services, secondary to hallucinations and COVID-19 positive status. Patient has been seen in her room through the window in the gregorio and interview was performed through the phone conversation. Patient reported no significant changes in her condition since yesterday, stated that her mood is \"okay\" today. She endorses good appetite and good quality of sleep, stated that she slept all night and most of the day, due to feeling tired, probably secondary to COVID infection. She is compliant with currently prescribed medications and denies any side effects. Patient reported that today morning she experienced brief auditory hallucinations, however, was not able to describe them. She denies having any auditory hallucinations during the interview. Patient denies current active suicidal or homicidal ideations, denies any plans. She denies visual hallucinations. OBJECTIVE    Physical  VITALS:  BP (!) 160/90   Pulse 79   Temp 97.2 °F (36.2 °C) (Temporal)   Resp 20   Ht 4' 11\" (1.499 m)   Wt 180 lb (81.6 kg)   SpO2 93%   BMI 36.36 kg/m²   TEMPERATURE:  Current - Temp: 97.2 °F (36.2 °C); Max - Temp  Av.1 °F (36.2 °C)  Min: 96.4 °F (35.8 °C)  Max: 97.9 °F (36.6 °C)  RESPIRATIONS RANGE: Resp  Av  Min: 20  Max: 20  PULSE RANGE: Pulse  Av.5  Min: 60  Max: 80  BLOOD PRESSURE RANGE:  Systolic (62LNT), KARINA:050 , Min:122 , JO ANN:000   ; Diastolic (74MLA), HSC:68, Min:82, Max:114    PULSE OXIMETRY RANGE: SpO2  Av.8 %  Min: 93 %  Max: 99 %      Mental Status Examination:  Appearance: Appropriately groomed and in hospital attire. Made intermittent eye contact. Behavior: Slightly withdrawn, cooperative with interview, socially appropriate. Mild psychomotor retardation appreciated.   Gait was not evaluated, as patient was lying down on the bed. Speech: Normal in tone, volume, and quality. Mood: \"okay\"   Affect: Mood congruent. Range is flat and restricted. Thought Process: Mostly circumstantial  Thought Content: Patient does not have any current active suicidal and homicidal ideations. No overt delusions or paranoia appreciated. Perceptions: Seems patient does not have any auditory or visual hallucinations at present time. Patient did not appear to be responding to internal stimuli. Orientation: to person, place and situation. Alert. Impulsivity: Questionable.    Neurovegitative: Good appetite, good sleep  Insight: Limited  Judgment: Limited    Data  Lab Results   Component Value Date    WBC 7.3 09/23/2021    HGB 10.4 (L) 09/23/2021    HCT 35.3 (L) 09/23/2021    MCV 72.2 (L) 09/23/2021     09/23/2021     Lab Results   Component Value Date     09/23/2021    K 3.9 09/23/2021     09/23/2021    CO2 24 09/23/2021    BUN 12 09/23/2021    CREATININE 0.4 (L) 09/23/2021    GLUCOSE 97 09/23/2021    CALCIUM 9.5 09/23/2021    PROT 6.7 09/23/2021    LABALBU 3.9 09/23/2021    BILITOT <0.2 09/23/2021    ALKPHOS 89 09/23/2021    AST 14 09/23/2021    ALT 16 09/23/2021    LABGLOM >60 09/23/2021    GFRAA >59 09/23/2021    GLOB 3.0 05/02/2017       Medications  Current Facility-Administered Medications: OLANZapine (ZYPREXA) tablet 20 mg, 20 mg, Oral, Nightly  ferrous sulfate (IRON 325) tablet 325 mg, 325 mg, Oral, BID   potassium chloride (KLOR-CON M) extended release tablet 20 mEq, 20 mEq, Oral, Once  0.9 % sodium chloride infusion, , IntraVENous, Continuous  sodium chloride flush 0.9 % injection 5-40 mL, 5-40 mL, IntraVENous, 2 times per day  sodium chloride flush 0.9 % injection 5-40 mL, 5-40 mL, IntraVENous, PRN  0.9 % sodium chloride infusion, 25 mL, IntraVENous, PRN  ondansetron (ZOFRAN-ODT) disintegrating tablet 4 mg, 4 mg, Oral, Q8H PRN **OR** ondansetron (ZOFRAN) injection 4 mg, 4 mg, IntraVENous, Q6H PRN  polyethylene glycol (GLYCOLAX) packet 17 g, 17 g, Oral, Daily PRN  acetaminophen (TYLENOL) tablet 650 mg, 650 mg, Oral, Q6H PRN **OR** acetaminophen (TYLENOL) suppository 650 mg, 650 mg, Rectal, Q6H PRN  albuterol sulfate  (90 Base) MCG/ACT inhaler 2 puff, 2 puff, Inhalation, Q6H PRN  docusate sodium (COLACE) capsule 100 mg, 100 mg, Oral, BID  tiotropium (SPIRIVA RESPIMAT) 2.5 MCG/ACT inhaler 2 puff, 2 puff, Inhalation, Daily  cyanocobalamin injection 1,000 mcg, 1,000 mcg, IntraMUSCular, Once  enoxaparin (LOVENOX) injection 40 mg, 40 mg, SubCUTAneous, BID  divalproex (DEPAKOTE ER) extended release tablet 500 mg, 500 mg, Oral, Daily    ASSESSMENT AND PLAN    DSM 5 DIAGNOSIS:  Schizophrenia  Rule out treatment noncompliance  Family relationship issues     Recommendations:  1. Currently patient is not suicidal or homicidal.  She endorses experience auditory hallucinations today morning. 2.    Will increase dose of Zyprexa from 15 mg to 20 mg once a day for psychosis. 3.   Psychiatry will reevaluate patient's condition tomorrow.

## 2021-09-23 NOTE — PROGRESS NOTES
[facility]        [facility]ist Progress Note  9/23/2021 10:19 AM  Subjective:   Admit Date: 9/20/2021  PCP: BARBARA Priest    Chief Complaint: Agitation, hallucinations    Subjective: Patient seen and examined at bedside. Sitter present. Breathing stable. Saturating well on room air. Denies chest pain. No acute complaints. Cumulative Hospital History: 60-year-old obese female with past medical history of schizophrenia presenting to the hospital for auditory and visual hallucinations along with aggressive behavior found to have incidental COVID-19 positive test.  Patient saturating well on room air. Psychiatry on board. ROS: 14 point review of systems is negative except as specifically addressed above. ADULT DIET;  Regular    Intake/Output Summary (Last 24 hours) at 9/23/2021 1019  Last data filed at 9/22/2021 1902  Gross per 24 hour   Intake 720 ml   Output --   Net 720 ml     Medications:   sodium chloride      sodium chloride       Current Facility-Administered Medications   Medication Dose Route Frequency Provider Last Rate Last Admin    ferrous sulfate (IRON 325) tablet 325 mg  325 mg Oral BID  Ignacio Victoria MD   325 mg at 09/23/21 1010    potassium chloride (KLOR-CON M) extended release tablet 20 mEq  20 mEq Oral Once Kentrell Araya MD        0.9 % sodium chloride infusion   IntraVENous Continuous Kentrell Araya MD        sodium chloride flush 0.9 % injection 5-40 mL  5-40 mL IntraVENous 2 times per day Kentrell Araya MD   10 mL at 09/23/21 1017    sodium chloride flush 0.9 % injection 5-40 mL  5-40 mL IntraVENous PRN Kentrell Araya MD        0.9 % sodium chloride infusion  25 mL IntraVENous PRN Kentrell Araya MD        ondansetron (ZOFRAN-ODT) disintegrating tablet 4 mg  4 mg Oral Q8H PRN Kentrell Araya MD        Or    ondansetron [facility]) injection 4 mg  4 mg IntraVENous Q6H PRN MD Tan Diaz polyethylene glycol (GLYCOLAX) packet 17 g  17 g Oral Daily PRN Rosa Finch MD        acetaminophen (TYLENOL) tablet 650 mg  650 mg Oral Q6H PRN Rosa Finch MD        Or    acetaminophen (TYLENOL) suppository 650 mg  650 mg Rectal Q6H PRN Rosa Finch MD        albuterol sulfate  (90 Base) MCG/ACT inhaler 2 puff  2 puff Inhalation Q6H PRN Rosa Finch MD        docusate sodium (COLACE) capsule 100 mg  100 mg Oral BID Rosa Finch MD   100 mg at 09/23/21 1010    tiotropium (SPIRIVA RESPIMAT) 2.5 MCG/ACT inhaler 2 puff  2 puff Inhalation Daily Rosa Finch MD        cyanocobalamin injection 1,000 mcg  1,000 mcg IntraMUSCular Once Rosa Finch MD        enoxaparin (LOVENOX) injection 40 mg  40 mg SubCUTAneous BID Miguel Lopez MD   40 mg at 09/23/21 1010    OLANZapine (ZYPREXA) tablet 15 mg  15 mg Oral Nightly Jesus Fung MD   15 mg at 09/22/21 2043    divalproex (DEPAKOTE ER) extended release tablet 500 mg  500 mg Oral Daily Jesus Fung MD   500 mg at 09/23/21 1010        Labs:     Recent Labs     09/20/21 2055 09/22/21 0308 09/23/21  0353   WBC 7.6 7.6 7.3   RBC 4.97 4.99 4.89   HGB 10.4* 10.7* 10.4*   HCT 35.7* 36.5* 35.3*   MCV 71.8* 73.1* 72.2*   MCH 20.9* 21.4* 21.3*   MCHC 29.1* 29.3* 29.5*    382 351     Recent Labs     09/20/21 2055 09/22/21  0308 09/23/21  0353    141 141   K 3.4* 4.3 3.9   ANIONGAP 11 10 12    105 105   CO2 26 26 24   BUN 8 8 12   CREATININE 0.6 0.5 0.4*   GLUCOSE 106 90 97   CALCIUM 9.6 9.7 9.5     Recent Labs     09/20/21  2055 09/22/21  0308 09/23/21  0353   MG 2.0 2.4 2.3     Recent Labs     09/20/21 2055 09/22/21 0308 09/23/21  0353   AST 12 16 14   ALT 11 16 16   BILITOT <0.2 <0.2 <0.2   ALKPHOS 94 92 89     ABGs:No results for input(s): PH, PO2, PCO2, HCO3, BE, O2SAT in the last 72 hours.   Troponin T: No results for input(s): TROPONINI in the last 72 hours.  INR: No results for input(s): INR in the last 72 hours. Lactic Acid: No results for input(s): LACTA in the last 72 hours. Objective:   Vitals: /82   Pulse 60   Temp 97.9 °F (36.6 °C) (Temporal)   Resp 20   Ht 4' 11\" (1.499 m)   Wt 180 lb (81.6 kg)   SpO2 95%   BMI 36.36 kg/m²   24HR INTAKE/OUTPUT:      Intake/Output Summary (Last 24 hours) at 9/23/2021 1019  Last data filed at 9/22/2021 1902  Gross per 24 hour   Intake 720 ml   Output --   Net 720 ml     General appearance: Alert  HEENT: AT/NC  Lungs: BLAE  Heart: RRR  Abdomen: BS+, soft  Extremities: pulses+  Neurologic: Alert, gross motor function intact  Skin: warm      Assessment and Plan: Active Problems:    Schizophrenia, chronic condition with acute exacerbation ([address]    Acute psychosis ([facility] [address]  Resolved Problems:    * No resolved hospital problems. *    Schizophrenia: As per psychiatry. COVID-19: Asymptomatic. No treatment indicated. Monitor O2 saturations. Obesity: Needs to make dietary and lifestyle modifications with a goal of weight loss. Supportive management.     Advance Directive: Full Code    DVT prophylaxis: Lovenox    Discharge planning: TBD - home once cleared by psychiatry      Signed:  Salima Soto MD 9/23/2021 10:19 AM  Rounding Hospitalist

## 2021-09-24 VITALS
OXYGEN SATURATION: 98 % | RESPIRATION RATE: 20 BRPM | WEIGHT: 180 LBS | TEMPERATURE: 97.5 F | BODY MASS INDEX: 36.29 KG/M2 | HEART RATE: 60 BPM | HEIGHT: 59 IN | SYSTOLIC BLOOD PRESSURE: 145 MMHG | DIASTOLIC BLOOD PRESSURE: 87 MMHG

## 2021-09-24 LAB
ALBUMIN SERPL-MCNC: 3.9 G/DL (ref 3.5–5.2)
ALP BLD-CCNC: 94 U/L (ref 35–104)
ALT SERPL-CCNC: 17 U/L (ref 5–33)
ANION GAP SERPL CALCULATED.3IONS-SCNC: 9 MMOL/L (ref 7–19)
ANISOCYTOSIS: ABNORMAL
AST SERPL-CCNC: 13 U/L (ref 5–32)
BASOPHILS ABSOLUTE: 0 K/UL (ref 0–0.2)
BASOPHILS RELATIVE PERCENT: 0.5 % (ref 0–1)
BILIRUB SERPL-MCNC: <0.2 MG/DL (ref 0.2–1.2)
BUN BLDV-MCNC: 13 MG/DL (ref 6–20)
CALCIUM SERPL-MCNC: 9.7 MG/DL (ref 8.6–10)
CHLORIDE BLD-SCNC: 104 MMOL/L (ref 98–111)
CO2: 27 MMOL/L (ref 22–29)
CREAT SERPL-MCNC: 0.5 MG/DL (ref 0.5–0.9)
EOSINOPHILS ABSOLUTE: 0.1 K/UL (ref 0–0.6)
EOSINOPHILS RELATIVE PERCENT: 1.3 % (ref 0–5)
GFR AFRICAN AMERICAN: >59
GFR NON-AFRICAN AMERICAN: >60
GLUCOSE BLD-MCNC: 92 MG/DL (ref 74–109)
HCT VFR BLD CALC: 35.8 % (ref 37–47)
HEMOGLOBIN: 10.6 G/DL (ref 12–16)
HYPOCHROMIA: ABNORMAL
IMMATURE GRANULOCYTES #: 0 K/UL
LYMPHOCYTES ABSOLUTE: 2.8 K/UL (ref 1.1–4.5)
LYMPHOCYTES RELATIVE PERCENT: 36 % (ref 20–40)
MAGNESIUM: 2.3 MG/DL (ref 1.6–2.6)
MCH RBC QN AUTO: 21.2 PG (ref 27–31)
MCHC RBC AUTO-ENTMCNC: 29.6 G/DL (ref 33–37)
MCV RBC AUTO: 71.7 FL (ref 81–99)
MICROCYTES: ABNORMAL
MONOCYTES ABSOLUTE: 0.6 K/UL (ref 0–0.9)
MONOCYTES RELATIVE PERCENT: 7.7 % (ref 0–10)
NEUTROPHILS ABSOLUTE: 4.2 K/UL (ref 1.5–7.5)
NEUTROPHILS RELATIVE PERCENT: 54.1 % (ref 50–65)
OVALOCYTES: ABNORMAL
PDW BLD-RTO: 22.2 % (ref 11.5–14.5)
PLATELET # BLD: 359 K/UL (ref 130–400)
PLATELET SLIDE REVIEW: ADEQUATE
PMV BLD AUTO: 10.2 FL (ref 9.4–12.3)
POTASSIUM SERPL-SCNC: 4 MMOL/L (ref 3.5–5)
RBC # BLD: 4.99 M/UL (ref 4.2–5.4)
SODIUM BLD-SCNC: 140 MMOL/L (ref 136–145)
TARGET CELLS: ABNORMAL
TOTAL PROTEIN: 7 G/DL (ref 6.6–8.7)
WBC # BLD: 7.7 K/UL (ref 4.8–10.8)

## 2021-09-24 PROCEDURE — 83735 ASSAY OF MAGNESIUM: CPT

## 2021-09-24 PROCEDURE — 6370000000 HC RX 637 (ALT 250 FOR IP): Performed by: HOSPITALIST

## 2021-09-24 PROCEDURE — 36415 COLL VENOUS BLD VENIPUNCTURE: CPT

## 2021-09-24 PROCEDURE — 99232 SBSQ HOSP IP/OBS MODERATE 35: CPT | Performed by: PSYCHIATRY & NEUROLOGY

## 2021-09-24 PROCEDURE — 2580000003 HC RX 258: Performed by: HOSPITALIST

## 2021-09-24 PROCEDURE — 6370000000 HC RX 637 (ALT 250 FOR IP): Performed by: INTERNAL MEDICINE

## 2021-09-24 PROCEDURE — 85025 COMPLETE CBC W/AUTO DIFF WBC: CPT

## 2021-09-24 PROCEDURE — 80053 COMPREHEN METABOLIC PANEL: CPT

## 2021-09-24 PROCEDURE — 6370000000 HC RX 637 (ALT 250 FOR IP): Performed by: PSYCHIATRY & NEUROLOGY

## 2021-09-24 PROCEDURE — 6360000002 HC RX W HCPCS: Performed by: INTERNAL MEDICINE

## 2021-09-24 RX ORDER — OLANZAPINE 20 MG/1
20 TABLET ORAL NIGHTLY
Qty: 30 TABLET | Refills: 0 | Status: SHIPPED | OUTPATIENT
Start: 2021-09-24 | End: 2021-10-05 | Stop reason: SDUPTHER

## 2021-09-24 RX ORDER — DIVALPROEX SODIUM 500 MG/1
500 TABLET, EXTENDED RELEASE ORAL DAILY
Qty: 30 TABLET | Refills: 0 | Status: SHIPPED | OUTPATIENT
Start: 2021-09-25 | End: 2021-10-05 | Stop reason: SDUPTHER

## 2021-09-24 RX ORDER — FERROUS SULFATE 325(65) MG
325 TABLET ORAL 2 TIMES DAILY WITH MEALS
Qty: 30 TABLET | Refills: 0 | Status: SHIPPED | OUTPATIENT
Start: 2021-09-24

## 2021-09-24 RX ADMIN — SODIUM CHLORIDE, PRESERVATIVE FREE 10 ML: 5 INJECTION INTRAVENOUS at 08:34

## 2021-09-24 RX ADMIN — FERROUS SULFATE TAB 325 MG (65 MG ELEMENTAL FE) 325 MG: 325 (65 FE) TAB at 08:34

## 2021-09-24 RX ADMIN — DIVALPROEX SODIUM 500 MG: 500 TABLET, EXTENDED RELEASE ORAL at 08:34

## 2021-09-24 RX ADMIN — TIOTROPIUM BROMIDE INHALATION SPRAY 2 PUFF: 3.12 SPRAY, METERED RESPIRATORY (INHALATION) at 08:35

## 2021-09-24 RX ADMIN — ENOXAPARIN SODIUM 40 MG: 40 INJECTION SUBCUTANEOUS at 08:33

## 2021-09-24 RX ADMIN — DOCUSATE SODIUM 100 MG: 100 CAPSULE ORAL at 08:34

## 2021-09-24 NOTE — DISCHARGE SUMMARY
Discharge Summary      Kathleen Funez  :  1975  MRN:  [id number]    Admit date:  2021  Discharge date:      Admitting Physician:  Casi Torre MD    Advance Directive: Full Code    Consults: psychiatry    Primary Care Physician:  BARBARA Hutchinson    Discharge Diagnoses:  Principal Problem:    Schizophrenia, chronic condition with acute exacerbation [facility])  Active Problems:    Acute psychosis ([address]  Resolved Problems:    * No resolved hospital problems. *      Significant Diagnostic Studies:   XR CHEST PORTABLE    Result Date: 2021  EXAMINATION: XR CHEST PORTABLE 2021 11:55 AM HISTORY: Covid, evaluate lung fields, history of nicotine dependence COMPARISON: 2019 FINDINGS: The heart and mediastinal contours appear normal. The lungs are clear. There is no pneumothorax or pleural effusion. The pulmonary vasculature appears grossly normal.    No evidence of acute cardiopulmonary process. Signed by Dr Pérez Rain      Pertinent Labs:   CBC:   Recent Labs     21  0308 21  0353 21  0415   WBC 7.6 7.3 7.7   HGB 10.7* 10.4* 10.6*    351 359     BMP:    Recent Labs     21  0308 21  0353 21  0415    141 140   K 4.3 3.9 4.0    105 104   CO2 26 24 27   BUN 8 12 13   CREATININE 0.5 0.4* 0.5   GLUCOSE 90 97 92     INR: No results for input(s): INR in the last 72 hours. ABGs:No results for input(s): PH, PO2, PCO2, HCO3, BE, O2SAT in the last 72 hours. Lactic Acid:No results for input(s): LACTA in the last 72 hours. Procedures: None  Hospital Course: 67-year-old obese female with past medical history of schizophrenia presenting to the hospital for auditory and visual hallucinations along with aggressive behavior found to have incidental COVID-19 positive test.  Patient saturating well on room air. Psychiatry on board with medications adjusted including increase of Depakote and Zyprexa during admission.   Patient is hemodynamically stable for discharge home today to follow-up with PCP and psychiatry as an outpatient. Patient remains asymptomatic from COVID-19 standpoint and continues to saturate well on room air. Psychiatry has cleared the patient for discharge today.     Physical Exam:  Vitals: BP (!) 145/87   Pulse 60   Temp 97.5 °F (36.4 °C) (Temporal)   Resp 20   Ht 4' 11\" (1.499 m)   Wt 180 lb (81.6 kg)   SpO2 98%   BMI 36.36 kg/m²   24HR INTAKE/OUTPUT:      Intake/Output Summary (Last 24 hours) at 9/24/2021 1517  Last data filed at 9/24/2021 1011  Gross per 24 hour   Intake 480 ml   Output --   Net 480 ml     General appearance: Alert  HEENT: AT/NC  Lungs: BLAE  Heart: RRR  Abdomen: BS+, soft  Extremities: pulses+  Neurologic: Alert, gross motor function intact  Skin: warm     Discharge Medications:       Cleo Gaspar   Home Medication Instructions QRN:[id number]    Printed on:09/24/21 1517   Medication Information                      albuterol sulfate  (90 Base) MCG/ACT inhaler  INHALE 2 PUFFS INTO THE LUNGS EVERY 6 HOURS AS NEEDED FOR WHEEZING             divalproex (DEPAKOTE ER) 500 MG extended release tablet  Take 1 tablet by mouth daily             docusate sodium (COLACE) 100 MG capsule  TAKE 1 CAPSULE BY MOUTH TWICE DAILY             ferrous sulfate (IRON 325) 325 (65 Fe) MG tablet  Take 1 tablet by mouth 2 times daily (with meals)             INCRUSE ELLIPTA 62.5 MCG/INH AEPB  INHALE 1 PUFF EVERY DAY AS DIRECTED             loratadine (CLARITIN) 10 MG tablet  TAKE 1 TABLET BY MOUTH DAILY             metFORMIN (GLUCOPHAGE) 500 MG tablet  TAKE 1 TABLET BY MOUTH TWICE DAILY WITH MEALS             OLANZapine (ZYPREXA) 20 MG tablet  Take 1 tablet by mouth nightly             tiotropium (SPIRIVA RESPIMAT) 2.5 MCG/ACT AERS inhaler  Inhale 2 puffs into the lungs daily             vitamin D (ERGOCALCIFEROL) 1.25 MG (44726 UT) CAPS capsule  TAKE 1 CAPSULE BY MOUTH 1 TIME A WEEK AFTER A MEAL                 Discharge Instructions: Follow up with BARBARA Cabrera in 7 days. Take medications as directed. Resume activity as tolerated. Diet: ADULT DIET; Regular     Disposition: Patient is medically stable and will be discharged Home. Time spent on discharge 35 minutes.     Signed:  Estelita Boateng MD 9/24/2021 3:17 PM

## 2021-09-24 NOTE — PROGRESS NOTES
[facility]        [facility] Progress Note  9/24/2021 10:34 AM  Subjective:   Admit Date: 9/20/2021  PCP: BARBARA Marshall    Chief Complaint: Agitation, hallucinations    Subjective: Patient seen and examined at bedside. Sitter present. Comfortable. Saturating well on room air. Cumulative Hospital History: 42-year-old obese female with past medical history of schizophrenia presenting to the hospital for auditory and visual hallucinations along with aggressive behavior found to have incidental COVID-19 positive test.  Patient saturating well on room air. Psychiatry on board. ROS: 14 point review of systems is negative except as specifically addressed above. ADULT DIET;  Regular    Intake/Output Summary (Last 24 hours) at 9/24/2021 1034  Last data filed at 9/24/2021 1011  Gross per 24 hour   Intake 720 ml   Output --   Net 720 ml     Medications:   sodium chloride      sodium chloride       Current Facility-Administered Medications   Medication Dose Route Frequency Provider Last Rate Last Admin    OLANZapine (ZYPREXA) tablet 20 mg  20 mg Oral Nightly Marjan Vazquez MD   20 mg at 09/23/21 2027    ferrous sulfate (IRON 325) tablet 325 mg  325 mg Oral BID  Brendan Larson MD   325 mg at 09/24/21 0834    potassium chloride (KLOR-CON M) extended release tablet 20 mEq  20 mEq Oral Once Alexander Gardner MD        0.9 % sodium chloride infusion   IntraVENous Continuous Alexander Gardner MD        sodium chloride flush 0.9 % injection 5-40 mL  5-40 mL IntraVENous 2 times per day Alexander Gardner MD   10 mL at 09/24/21 0834    sodium chloride flush 0.9 % injection 5-40 mL  5-40 mL IntraVENous PRN Alexander Gardner MD        0.9 % sodium chloride infusion  25 mL IntraVENous PRN Alexander Gardner MD        ondansetron (ZOFRAN-ODT) disintegrating tablet 4 mg  4 mg Oral Q8H PRN Alexander Gardner MD        Or    ondansetron [facility]) injection 4 mg  4 mg IntraVENous Q6H PRN Brian Long MD        polyethylene glycol [facility]) packet 17 g  17 g Oral Daily PRN Brian Long MD        acetaminophen (TYLENOL) tablet 650 mg  650 mg Oral Q6H PRN Brian Long MD        Or    acetaminophen (TYLENOL) suppository 650 mg  650 mg Rectal Q6H PRN Brian Long MD        albuterol sulfate  (90 Base) MCG/ACT inhaler 2 puff  2 puff Inhalation Q6H PRN Brian Long MD        docusate sodium (COLACE) capsule 100 mg  100 mg Oral BID Brian Long MD   100 mg at 09/24/21 0834    tiotropium (SPIRIVA RESPIMAT) 2.5 MCG/ACT inhaler 2 puff  2 puff Inhalation Daily Brian Long MD   2 puff at 09/24/21 0919    cyanocobalamin injection 1,000 mcg  1,000 mcg IntraMUSCular Once Brian Long MD        enoxaparin (LOVENOX) injection 40 mg  40 mg SubCUTAneous BID Frida Cardona MD   40 mg at 09/24/21 4751    divalproex (DEPAKOTE ER) extended release tablet 500 mg  500 mg Oral Daily Tanika Hernandez MD   500 mg at 09/24/21 0834        Labs:     Recent Labs     09/22/21 0308 09/23/21 0353 09/24/21  0415   WBC 7.6 7.3 7.7   RBC 4.99 4.89 4.99   HGB 10.7* 10.4* 10.6*   HCT 36.5* 35.3* 35.8*   MCV 73.1* 72.2* 71.7*   MCH 21.4* 21.3* 21.2*   MCHC 29.3* 29.5* 29.6*    351 359     Recent Labs     09/22/21 0308 09/23/21  0353 09/24/21  0415    141 140   K 4.3 3.9 4.0   ANIONGAP 10 12 9    105 104   CO2 26 24 27   BUN 8 12 13   CREATININE 0.5 0.4* 0.5   GLUCOSE 90 97 92   CALCIUM 9.7 9.5 9.7     Recent Labs     09/22/21 0308 09/23/21  0353 09/24/21  0415   MG 2.4 2.3 2.3     Recent Labs     09/22/21  0308 09/23/21  0353 09/24/21  0415   AST 16 14 13   ALT 16 16 17   BILITOT <0.2 <0.2 <0.2   ALKPHOS 92 89 94     ABGs:No results for input(s): PH, PO2, PCO2, HCO3, BE, O2SAT in the last 72 hours. Troponin T: No results for input(s): TROPONINI in the last 72 hours.   INR: No results for input(s): INR in the last 72 hours. Lactic Acid: No results for input(s): LACTA in the last 72 hours. Objective:   Vitals: BP (!) 159/97   Pulse 65   Temp 97 °F (36.1 °C) (Temporal)   Resp 20   Ht 4' 11\" (1.499 m)   Wt 180 lb (81.6 kg)   SpO2 97%   BMI 36.36 kg/m²   24HR INTAKE/OUTPUT:      Intake/Output Summary (Last 24 hours) at 9/24/2021 1034  Last data filed at 9/24/2021 1011  Gross per 24 hour   Intake 720 ml   Output --   Net 720 ml     General appearance: Alert  HEENT: AT/NC  Lungs: BLAE  Heart: RRR  Abdomen: BS+, soft  Extremities: pulses+  Neurologic: Alert, gross motor function intact  Skin: warm      Assessment and Plan: Active Problems:    Schizophrenia, chronic condition with acute exacerbation ([facility] [address]    Acute psychosis ([facility] [address]  Resolved Problems:    * No resolved hospital problems. *    Schizophrenia: As per psychiatry. COVID-19: Asymptomatic. No treatment indicated. Monitor O2 saturations. Obesity: Needs to make dietary and lifestyle modifications with a goal of weight loss. Supportive management.     Advance Directive: Full Code    DVT prophylaxis: Lovenox    Discharge planning: TBD - home once cleared by psychiatry      Signed:  Ca Renee MD 9/24/2021 10:34 AM  Rounding Hospitalist

## 2021-09-24 NOTE — PLAN OF CARE
Problem: Airway Clearance - Ineffective  Goal: Achieve or maintain patent airway  9/23/2021 2351 by Michael Freitas RN  Outcome: Ongoing  9/23/2021 1137 by Dakotah Dumont RN  Outcome: Ongoing     Problem: Gas Exchange - Impaired  Goal: Absence of hypoxia  9/23/2021 2351 by Michael Freitas RN  Outcome: Ongoing  9/23/2021 1137 by Dakotah Dumont RN  Outcome: Ongoing  Goal: Promote optimal lung function  9/23/2021 2351 by Michael Freitas RN  Outcome: Ongoing  9/23/2021 1137 by Dakotah Dumont RN  Outcome: Ongoing     Problem: Breathing Pattern - Ineffective  Goal: Ability to achieve and maintain a regular respiratory rate  9/23/2021 2351 by Michael Freitas RN  Outcome: Ongoing  9/23/2021 1137 by Dakotah Dumont RN  Outcome: Ongoing     Problem:  Body Temperature -  Risk of, Imbalanced  Goal: Ability to maintain a body temperature within defined limits  9/23/2021 2351 by Michael Freitas RN  Outcome: Ongoing  9/23/2021 1137 by Dakotah Dumont RN  Outcome: Ongoing  Goal: Will regain or maintain usual level of consciousness  9/23/2021 2351 by Michael Freitas RN  Outcome: Ongoing  9/23/2021 1137 by Dakotah Dumont RN  Outcome: Ongoing  Goal: Complications related to the disease process, condition or treatment will be avoided or minimized  9/23/2021 2351 by Michael Freitas RN  Outcome: Ongoing  9/23/2021 1137 by Dakotah Dumont RN  Outcome: Ongoing     Problem: Isolation Precautions - Risk of Spread of Infection  Goal: Prevent transmission of infection  9/23/2021 2351 by Michael Freitas RN  Outcome: Ongoing  9/23/2021 1137 by Dakotah Dumont RN  Outcome: Ongoing     Problem: Nutrition Deficits  Goal: Optimize nutritional status  9/23/2021 2351 by Michael Freitas RN  Outcome: Ongoing  9/23/2021 1137 by Dakotah Dumont RN  Outcome: Ongoing     Problem: Risk for Fluid Volume Deficit  Goal: Maintain normal heart rhythm  9/23/2021 2351 by Michael Freitas RN  Outcome: Ongoing  9/23/2021 1137 by Dakotah Julia, RN  Outcome: Ongoing  Goal: Maintain absence of muscle cramping  9/23/2021 2351 by Ana Brown RN  Outcome: Ongoing  9/23/2021 1137 by Enrique Lundberg RN  Outcome: Ongoing  Goal: Maintain normal serum potassium, sodium, calcium, phosphorus, and pH  9/23/2021 2351 by Ana Brown RN  Outcome: Ongoing  9/23/2021 1137 by Enrique Lundberg RN  Outcome: Ongoing     Problem: Loneliness or Risk for Loneliness  Goal: Demonstrate positive use of time alone when socialization is not possible  9/23/2021 2351 by Ana Brown RN  Outcome: Ongoing  9/23/2021 1137 by Enrique Lundberg RN  Outcome: Ongoing     Problem: Fatigue  Goal: Verbalize increase energy and improved vitality  9/23/2021 2351 by Ana Brown RN  Outcome: Ongoing  9/23/2021 1137 by Enrique Lundberg RN  Outcome: Ongoing     Problem: Patient Education: Go to Patient Education Activity  Goal: Patient/Family Education  9/23/2021 2351 by Ana Brown RN  Outcome: Ongoing  9/23/2021 1137 by Enrique Lundberg RN  Outcome: Ongoing     Problem: Falls - Risk of:  Goal: Will remain free from falls  Description: Will remain free from falls  9/23/2021 2351 by Ana Brown RN  Outcome: Ongoing  9/23/2021 1137 by Enrique Lundberg RN  Outcome: Ongoing  Goal: Absence of physical injury  Description: Absence of physical injury  9/23/2021 2351 by Ana Brown RN  Outcome: Ongoing  9/23/2021 1137 by Enrique Lundberg RN  Outcome: Ongoing

## 2021-09-24 NOTE — BH NOTE
Department of Psychiatry  Attending Progress Note      SUBJECTIVE:   41years old female with previous psychiatric history of schizophrenia, who has been admitted to medical services, secondary to hallucinations and COVID-19 positive status. Patient has been seen in her room through the window from the gregorio and interview was performed through the phone conversation, with presence of the patient's nurse in the room. Patient reported that her condition significantly improved during this hospital stay and her mood is \"good\" today. Patient reported good appetite and good quality of sleep during the last night. She is compliant with currently prescribed medications and denies any side effects. Patient denies significant affective symptomatology today, denies any depression, anxiety, low energy level, muscle weakness, feeling of fatigue and tiredness. She denies current active suicidal or homicidal ideations, denies any plans. Also, patient denies having auditory and visual hallucinations after last adjustment of her psychotropic medications. Patient reported that she is ready to be discharged from the hospital home and I informed her that I will address this question to her primary treatment team.      OBJECTIVE    Physical  VITALS:  BP (!) 145/87   Pulse 60   Temp 97.5 °F (36.4 °C) (Temporal)   Resp 20   Ht 4' 11\" (1.499 m)   Wt 180 lb (81.6 kg)   SpO2 98%   BMI 36.36 kg/m²   TEMPERATURE:  Current - Temp: 97.5 °F (36.4 °C); Max - Temp  Av.9 °F (36.1 °C)  Min: 96.1 °F (35.6 °C)  Max: 97.5 °F (36.4 °C)  RESPIRATIONS RANGE: Resp  Av  Min: 20  Max: 20  PULSE RANGE: Pulse  Av.8  Min: 60  Max: 87  BLOOD PRESSURE RANGE:  Systolic (26BHH), KMK:151 , Min:121 , DBS:663   ; Diastolic (28HTQ), BKI:16, Min:84, Max:99    PULSE OXIMETRY RANGE: SpO2  Av.3 %  Min: 93 %  Max: 98 %      Mental Status Examination:    Appearance: Appropriately groomed and in hospital attire. Made good eye contact. Behavior: Mildly withdrawn, cooperative with interview, socially appropriate. Mild psychomotor retardation appreciated. Gait is unremarkable. Speech: Normal in tone, mildly decrease in volume  Mood: \"Good\"   Affect: Mood congruent. Range is slightly restricted  Thought Process: mostly circumstantial, sometimes linear and goal oriented. Thought Content: Patient does not have any current active suicidal and homicidal ideations. No overt delusions or paranoia appreciated. Perceptions: Seems patient does not have any auditory or visual hallucinations at present time. Patient did not appear to be responding to internal stimuli. Orientation: to person, place and situation. Alert. Impulsivity: Improved.    Neurovegitative: Good appetite, good sleep  Insight: Limited  Judgment: Limited    Data  Lab Results   Component Value Date    WBC 7.7 09/24/2021    HGB 10.6 (L) 09/24/2021    HCT 35.8 (L) 09/24/2021    MCV 71.7 (L) 09/24/2021     09/24/2021     Lab Results   Component Value Date     09/24/2021    K 4.0 09/24/2021     09/24/2021    CO2 27 09/24/2021    BUN 13 09/24/2021    CREATININE 0.5 09/24/2021    GLUCOSE 92 09/24/2021    CALCIUM 9.7 09/24/2021    PROT 7.0 09/24/2021    LABALBU 3.9 09/24/2021    BILITOT <0.2 09/24/2021    ALKPHOS 94 09/24/2021    AST 13 09/24/2021    ALT 17 09/24/2021    LABGLOM >60 09/24/2021    GFRAA >59 09/24/2021    GLOB 3.0 05/02/2017       Medications  Current Facility-Administered Medications: OLANZapine (ZYPREXA) tablet 20 mg, 20 mg, Oral, Nightly  ferrous sulfate (IRON 325) tablet 325 mg, 325 mg, Oral, BID WC  potassium chloride (KLOR-CON M) extended release tablet 20 mEq, 20 mEq, Oral, Once  0.9 % sodium chloride infusion, , IntraVENous, Continuous  sodium chloride flush 0.9 % injection 5-40 mL, 5-40 mL, IntraVENous, 2 times per day  sodium chloride flush 0.9 % injection 5-40 mL, 5-40 mL, IntraVENous, PRN  0.9 % sodium chloride infusion, 25 mL, IntraVENous, PRN  ondansetron (ZOFRAN-ODT) disintegrating tablet 4 mg, 4 mg, Oral, Q8H PRN **OR** ondansetron (ZOFRAN) injection 4 mg, 4 mg, IntraVENous, Q6H PRN  polyethylene glycol (GLYCOLAX) packet 17 g, 17 g, Oral, Daily PRN  acetaminophen (TYLENOL) tablet 650 mg, 650 mg, Oral, Q6H PRN **OR** acetaminophen (TYLENOL) suppository 650 mg, 650 mg, Rectal, Q6H PRN  albuterol sulfate  (90 Base) MCG/ACT inhaler 2 puff, 2 puff, Inhalation, Q6H PRN  docusate sodium (COLACE) capsule 100 mg, 100 mg, Oral, BID  tiotropium (SPIRIVA RESPIMAT) 2.5 MCG/ACT inhaler 2 puff, 2 puff, Inhalation, Daily  cyanocobalamin injection 1,000 mcg, 1,000 mcg, IntraMUSCular, Once  enoxaparin (LOVENOX) injection 40 mg, 40 mg, SubCUTAneous, BID  divalproex (DEPAKOTE ER) extended release tablet 500 mg, 500 mg, Oral, Daily    ASSESSMENT AND PLAN    DSM 5 DIAGNOSIS:  Schizophrenia  Rule out treatment noncompliance  Family relationship issues     Recommendations:  1. Currently patient is not suicidal, homicidal or psychotic. 2. Patient responded positively for adjustment of psychotropic medications and her current mental health condition does not require additional adjustment of psychotropic medications. 3. Patient can be discharged home when she is medically stable. 4. Psychiatry will sign off today.

## 2021-09-27 ENCOUNTER — CARE COORDINATION (OUTPATIENT)
Dept: CASE MANAGEMENT | Age: 46
End: 2021-09-27

## 2021-09-27 NOTE — CARE COORDINATION
[facility] Transitions Initial Follow Up Call    Call within 2 business days of discharge: Yes    Patient: Jeannette Solano Patient : 1975   MRN: [id number]  Reason for Admission: Mental Health issues, COVID 19   Discharge Date: 21 RARS: Readmission Risk Score: 10      Last Discharge [address]       Complaint Diagnosis Description Type Department Provider    21 Mental Health Problem COVID-19 . .. ED to Hosp-Admission (Discharged) (ADMITTED) [facility] Akhil Rudolph MD; Tala Sharp Pis. .. Spoke with: [facility]: [facility]      Non-face-to-face services provided:  Reviewed encounter information for continuity of care prior to follow up Care Transitions phone call - chart notes, consults, progress notes, test results, med list, appointments, AVS, other information. Care Transitions 24 Hour Call    Schedule Follow Up Appointment with PCP: Completed  Do you have any ongoing symptoms?: No  Do you have a copy of your discharge instructions?: Yes  Do you have all of your prescriptions and are they filled?: Yes  Have you been contacted by a [address]?: No  Have you scheduled your follow up appointment?: Yes  How are you going to get to your appointment?: Car - family or friend to transport  Were you discharged with any Home Care or Post Acute Services: No  Do you feel like you have everything you need to keep you well at home?: Yes  Care Transitions Interventions       Placed a call to the number listed for patient for an initial follow up call for Care Transitions Coordination following discharge from the hospital. Spoke with patient regarding hospitalization, discharge and status thus far. Spoke first with her mother and then the patient. She answered all of my questions with yes or no or ok answers. She did say that she is doing fine. She said she has all of her meds and is taking them as ordered. Only reviewed the three discharge meds.   She is aware of her hospital follow up appointment. She denied any abnormal symptoms of COVID 19 or otherwise. She said she is eating, drinking, sleeping. She is not aware of any issues. Informed of CTC process, purpose, future calls, etc and is agreeable with appropriate follow up. Ensured to have CTN contact information to be used as needed. Encouraged to call as needed for new issues, questions, etc.  Given the opportunity to ask questions and answered appropriately. CTN to follow up as indicated. Transitions of Care Initial Call    Was this an external facility discharge? No    Challenges to be reviewed by the provider   Additional needs identified to be addressed with provider: No       Method of communication with provider : none      Advance Care Planning:   Does patient have an Advance Directive: not on file; education provided. Was this a readmission? NO  Patients top risk factors for readmission: medical condition-COVID 19 positive diagnosis and medication management    Care Transition Nurse (CTN) contacted the patient by telephone to perform post hospital discharge assessment. Verified name and  with patient as identifiers. Provided introduction to self, and explanation of the CTN role. CTN reviewed discharge instructions, medical action plan and red flags with patient who verbalized understanding. Patient given an opportunity to ask questions and does not have any further questions or concerns at this time. Were discharge instructions available to patient? Yes. Reviewed appropriate site of care based on symptoms and resources available to patient including: PCP, Specialist, Urgent care clinics, When to call 911 and [address]. The patient agrees to contact the PCP office for questions related to their healthcare. Medication reconciliation was performed partially with patient, who verbalizes understanding of administration of home medications.  Advised obtaining a 90-day supply of all daily and as-needed medications. Covid Risk Education     Educated patient about risk for severe COVID-19 due to risk factors according to CDC guidelines. CTN reviewed discharge instructions, medical action plan and red flag symptoms with the patient who verbalized understanding. Discussed COVID vaccination status: Yes. Education provided on COVID-19 vaccination as appropriate. Discussed exposure protocols and quarantine with CDC Guidelines. Patient was given an opportunity to verbalize any questions and concerns and agrees to contact CTN or health care provider for questions related to their healthcare. Reviewed and educated patient on any new and changed medications related to discharge diagnosis. CTN provided contact information. Plan for follow-up call in 5-7 days based on severity of symptoms and risk factors.   Plan for next call: - disease process mgmt, symptom mgmt, diet/hydration, pain control needs, medication mgmt, activity level, home safety needs, infection control, fall precautions, seeking medical attention, who/when to call prn any needs, etc.    Follow Up  Future Appointments   Date Time Provider Mohini Helton   10/5/2021  8:15 AM Marcelino Felty, APRN [facility]   11/16/2021  2:00 PM Dereck Felty, [address] Rudd RN

## 2021-10-05 ENCOUNTER — CARE COORDINATION (OUTPATIENT)
Dept: CASE MANAGEMENT | Age: 46
End: 2021-10-05

## 2021-10-05 ENCOUNTER — OFFICE VISIT (OUTPATIENT)
Dept: PRIMARY CARE CLINIC | Age: 46
End: 2021-10-05
Payer: MEDICAID

## 2021-10-05 VITALS
SYSTOLIC BLOOD PRESSURE: 108 MMHG | OXYGEN SATURATION: 96 % | HEART RATE: 98 BPM | WEIGHT: 186 LBS | HEIGHT: 59 IN | TEMPERATURE: 97.6 F | RESPIRATION RATE: 18 BRPM | DIASTOLIC BLOOD PRESSURE: 80 MMHG | BODY MASS INDEX: 37.5 KG/M2

## 2021-10-05 DIAGNOSIS — J44.9 STAGE 2 MODERATE COPD BY GOLD CLASSIFICATION (HCC): ICD-10-CM

## 2021-10-05 DIAGNOSIS — F20.89 OTHER SCHIZOPHRENIA (HCC): ICD-10-CM

## 2021-10-05 DIAGNOSIS — Z09 HOSPITAL DISCHARGE FOLLOW-UP: Primary | ICD-10-CM

## 2021-10-05 PROCEDURE — 1111F DSCHRG MED/CURRENT MED MERGE: CPT | Performed by: NURSE PRACTITIONER

## 2021-10-05 PROCEDURE — 99214 OFFICE O/P EST MOD 30 MIN: CPT | Performed by: NURSE PRACTITIONER

## 2021-10-05 RX ORDER — DIVALPROEX SODIUM 500 MG/1
500 TABLET, EXTENDED RELEASE ORAL DAILY
Qty: 30 TABLET | Refills: 5 | Status: ON HOLD | OUTPATIENT
Start: 2021-10-05 | End: 2021-11-09 | Stop reason: HOSPADM

## 2021-10-05 RX ORDER — OLANZAPINE 20 MG/1
20 TABLET ORAL NIGHTLY
Qty: 30 TABLET | Refills: 5 | Status: ON HOLD | OUTPATIENT
Start: 2021-10-05 | End: 2021-11-09 | Stop reason: HOSPADM

## 2021-10-05 RX ORDER — ALBUTEROL SULFATE 90 UG/1
2 AEROSOL, METERED RESPIRATORY (INHALATION) EVERY 6 HOURS PRN
Qty: 8.5 G | Refills: 2 | Status: SHIPPED | OUTPATIENT
Start: 2021-10-05 | End: 2022-01-02

## 2021-10-05 ASSESSMENT — PATIENT HEALTH QUESTIONNAIRE - PHQ9
SUM OF ALL RESPONSES TO PHQ QUESTIONS 1-9: 0
SUM OF ALL RESPONSES TO PHQ QUESTIONS 1-9: 0
2. FEELING DOWN, DEPRESSED OR HOPELESS: 0
SUM OF ALL RESPONSES TO PHQ QUESTIONS 1-9: 0
SUM OF ALL RESPONSES TO PHQ9 QUESTIONS 1 & 2: 0
1. LITTLE INTEREST OR PLEASURE IN DOING THINGS: 0

## 2021-10-05 ASSESSMENT — ENCOUNTER SYMPTOMS
GASTROINTESTINAL NEGATIVE: 1
RESPIRATORY NEGATIVE: 1
EYES NEGATIVE: 1

## 2021-10-05 NOTE — PROGRESS NOTES
Post-Discharge Transitional Care Management Services or Hospital Follow Up      Colbert Cushing   YOB: 1975    Date of Office Visit:  10/5/2021  Date of Hospital Admission: 9/20/21  Date of Hospital Discharge: 9/24/21  Readmission Risk Score(high >=14%.  Medium >=10%):Readmission Risk Score: 10      Care management risk score Rising risk (score 2-5) and Complex Care (Scores >=6): 2     Non face to face  following discharge, date last encounter closed (first attempt may have been earlier): 9/27/2021 11:50 AM 9/27/2021 11:50 AM    Call initiated 2 business days of discharge: Yes     Patient Active Problem List   Diagnosis    [facility]ia, [facility] condition [facility] exacerbation [facility]    Iron deficiency    Vitamin D deficiency    Nicotine dependence    Vitamin B deficiency    Stage 2 moderate COPD by GOLD classification ([facility]    Acute psychosis ([facility]       No Known Allergies    Medications listed as ordered at the time of discharge from hospital   Pat[address] Medication Instructions SHAYLA:    Printed on:10/05/21 3037   Medication Information                      albuterol sulfate  (90 Base) MCG/ACT inhaler  Inhale 2 puffs into the lungs every 6 hours as needed for Wheezing             divalproex (DEPAKOTE ER) 500 MG extended release tablet  Take 1 tablet by mouth daily             docusate sodium (COLACE) 100 MG capsule  TAKE 1 CAPSULE BY MOUTH TWICE DAILY             ferrous sulfate (IRON 325) 325 (65 Fe) MG tablet  Take 1 tablet by mouth 2 times daily (with meals)             INCRUSE ELLIPTA 62.5 MCG/INH AEPB  INHALE 1 PUFF EVERY DAY AS DIRECTED             loratadine (CLARITIN) 10 MG tablet  TAKE 1 TABLET BY MOUTH DAILY             metFORMIN (GLUCOPHAGE) 500 MG tablet  TAKE 1 TABLET BY MOUTH TWICE DAILY WITH MEALS             OLANZapine (ZYPREXA) 20 MG tablet  Take 1 tablet by mouth nightly             tiotropium (SPIRIVA RESPIMAT) 2.5 MCG/ACT AERS inhaler  Inhale 2 puffs adjusted and Zyprexa was increased to 20 mg daily along with restarting Depakote. This time Depakote was started at 500 mg daily. During hospital stay she was found to have Covid incidentally. She does not have any symptoms related to her positive Covid test.    Review of Systems   Constitutional: Negative. HENT: Negative. Eyes: Negative. Respiratory: Negative. Cardiovascular: Negative. Gastrointestinal: Negative. Endocrine: Negative. Genitourinary: Negative. Musculoskeletal: Negative. Skin: Negative. Neurological: Negative. Hematological: Negative. Psychiatric/Behavioral: Negative. Vitals:    10/05/21 0758   BP: 108/80   Site: Left Upper Arm   Position: Sitting   Pulse: 98   Resp: 18   Temp: 97.6 °F (36.4 °C)   TempSrc: Temporal   SpO2: 96%   Weight: 186 lb (84.4 kg)   Height: 4' 11\" (1.499 m)     Body mass index is 37.57 kg/m². Wt Readings from Last 3 Encounters:   10/05/21 186 lb (84.4 kg)   09/20/21 180 lb (81.6 kg)   05/12/21 208 lb (94.3 kg)     BP Readings from Last 3 Encounters:   10/05/21 108/80   09/24/21 (!) 145/87   05/12/21 122/84       Physical Exam  Vitals and nursing note reviewed. Constitutional:       Appearance: Normal appearance. She is well-developed. HENT:      Head: Normocephalic and atraumatic. Right Ear: Hearing, tympanic membrane, ear canal and external ear normal.      Left Ear: Hearing, tympanic membrane, ear canal and external ear normal.      Nose: Nose normal.      Mouth/Throat:      Pharynx: Uvula midline. Eyes:      General: Lids are normal. No scleral icterus. Conjunctiva/sclera: Conjunctivae normal.      Pupils: Pupils are equal, round, and reactive to light. Neck:      Thyroid: No thyroid mass or thyromegaly. Trachea: Trachea normal.   Cardiovascular:      Rate and Rhythm: Normal rate and regular rhythm. Pulses: Normal pulses. Heart sounds: Normal heart sounds.    Pulmonary:      Effort: Pulmonary effort is normal.      Breath sounds: Wheezing present. No decreased breath sounds, rhonchi or rales. Abdominal:      General: Bowel sounds are normal.      Palpations: Abdomen is soft. Musculoskeletal:         General: Normal range of motion. Cervical back: Normal range of motion and neck supple. No tenderness. Thoracic back: Normal. No tenderness. Normal range of motion. Lumbar back: Normal. No tenderness. Normal range of motion. Skin:     General: Skin is warm and dry. Neurological:      Mental Status: She is alert and oriented to person, place, and time. Psychiatric:         Mood and Affect: Mood is not anxious or depressed. Speech: Speech is delayed. Behavior: Behavior is slowed and withdrawn. Thought Content: Thought content normal.         Judgment: Judgment normal.             Assessment/Plan:  1. Hospital discharge follow-up    - WI DISCHARGE MEDS RECONCILED W/ CURRENT OUTPATIENT MED LIST    2. Stage 2 moderate COPD by GOLD classification ([facility]    - albuterol sulfate  (90 Base) MCG/ACT inhaler; Inhale 2 puffs into the lungs every 6 hours as needed for Wheezing  Dispense: 8.5 g; Refill: 2    3. Other schizophrenia ([facility]    - CBC; Future  - Comprehensive Metabolic Panel; Future  - Valproic Acid Level, Total; Future        Medical Decision Making: high complexity      I am checking labs in 1 month prior to next appointment. We will continue Zyprexa along with Depakote. Refills have been sent to the pharmacy. We will continue all other meds as prescribed.

## 2021-10-08 ENCOUNTER — TELEPHONE (OUTPATIENT)
Dept: PRIMARY CARE CLINIC | Age: 46
End: 2021-10-08

## 2021-10-08 ENCOUNTER — CARE COORDINATION (OUTPATIENT)
Dept: CASE MANAGEMENT | Age: 46
End: 2021-10-08

## 2021-10-08 NOTE — TELEPHONE ENCOUNTER
----- Message from BARBARA Caal sent at 10/6/2021 11:54 PM CDT -----  Please let patient know that lab results were stable. Depakote level is not in range yet. May need to recheck at follow up visit. Thanks!

## 2021-10-08 NOTE — TELEPHONE ENCOUNTER
Spoke with pt's mother and let her know labs were stable and she may just need to recheck Depakote level at next visit. Pt's mother thanked me and RON.

## 2021-10-15 ENCOUNTER — CARE COORDINATION (OUTPATIENT)
Dept: CASE MANAGEMENT | Age: 46
End: 2021-10-15

## 2021-10-15 NOTE — CARE COORDINATION
[facility] Transitions Follow Up Call    10/15/2021    Patient: Kostas Dejesus  Patient : 1975   MRN: [id number]  Reason for Admission: COVID 19   Discharge Date: 21 RARS: Readmission Risk Score: 10         Spoke with: [place] Transitions Subsequent and Final Call    Subsequent and Final Calls  Do you have any ongoing symptoms?: No  Have your medications changed?: No  Do you have any questions related to your medications?: No  Do you currently have any active services?: No  Do you have any needs or concerns that I can assist you with?: No  Identified Barriers: None  Care Transitions Interventions  Other Interventions:         Placed a call to the home and spoke with patient for follow up. She reported that she is doing well. Denied any complaints. Said her breathing is ok. She is not having any cough, congestion, fever or other symptoms. She is eating and drinking well. She is tolerating activity well. She is not aware of any needs. She has all of her meds and is taking them as ordered. She does not see any need for any further CTN calls. Will sign off effective today.      Follow Up  Future Appointments   Date Time Provider Mohini Helton   2021  2:00 PM BARBARA [facility]       Joyce Alvarez RN

## 2021-11-01 ENCOUNTER — HOSPITAL ENCOUNTER (INPATIENT)
Age: 46
LOS: 8 days | Discharge: HOME | DRG: 885 | End: 2021-11-09
Attending: EMERGENCY MEDICINE | Admitting: PSYCHIATRY & NEUROLOGY
Payer: MEDICAID

## 2021-11-01 DIAGNOSIS — F20.9 SCHIZOPHRENIA, CHRONIC CONDITION WITH ACUTE EXACERBATION (HCC): Primary | ICD-10-CM

## 2021-11-01 LAB
ALBUMIN SERPL-MCNC: 4.1 G/DL (ref 3.5–5.2)
ALP BLD-CCNC: 87 U/L (ref 35–104)
ALT SERPL-CCNC: 8 U/L (ref 5–33)
AMPHETAMINE SCREEN, URINE: NEGATIVE
ANION GAP SERPL CALCULATED.3IONS-SCNC: 13 MMOL/L (ref 7–19)
ANISOCYTOSIS: ABNORMAL
AST SERPL-CCNC: 8 U/L (ref 5–32)
BACTERIA: NEGATIVE /HPF
BARBITURATE SCREEN URINE: NEGATIVE
BASOPHILS ABSOLUTE: 0 K/UL (ref 0–0.2)
BASOPHILS RELATIVE PERCENT: 0.4 % (ref 0–1)
BENZODIAZEPINE SCREEN, URINE: NEGATIVE
BILIRUB SERPL-MCNC: <0.2 MG/DL (ref 0.2–1.2)
BILIRUBIN URINE: NEGATIVE
BLOOD, URINE: ABNORMAL
BUN BLDV-MCNC: 5 MG/DL (ref 6–20)
CALCIUM SERPL-MCNC: 9.7 MG/DL (ref 8.6–10)
CANNABINOID SCREEN URINE: NEGATIVE
CHLORIDE BLD-SCNC: 102 MMOL/L (ref 98–111)
CLARITY: CLEAR
CO2: 25 MMOL/L (ref 22–29)
COCAINE METABOLITE SCREEN URINE: NEGATIVE
COLOR: YELLOW
CREAT SERPL-MCNC: 0.6 MG/DL (ref 0.5–0.9)
CRYSTALS, UA: ABNORMAL /HPF
EOSINOPHILS ABSOLUTE: 0 K/UL (ref 0–0.6)
EOSINOPHILS RELATIVE PERCENT: 0.3 % (ref 0–5)
EPITHELIAL CELLS, UA: 2 /HPF (ref 0–5)
ETHANOL: <10 MG/DL (ref 0–0.08)
GFR AFRICAN AMERICAN: >59
GFR NON-AFRICAN AMERICAN: >60
GLUCOSE BLD-MCNC: 134 MG/DL (ref 74–109)
GLUCOSE URINE: NEGATIVE MG/DL
HCG QUALITATIVE: NEGATIVE
HCT VFR BLD CALC: 38.6 % (ref 37–47)
HEMOGLOBIN: 12 G/DL (ref 12–16)
HYALINE CASTS: 1 /HPF (ref 0–8)
HYPOCHROMIA: ABNORMAL
IMMATURE GRANULOCYTES #: 0.1 K/UL
KETONES, URINE: NEGATIVE MG/DL
LEUKOCYTE ESTERASE, URINE: NEGATIVE
LYMPHOCYTES ABSOLUTE: 1.8 K/UL (ref 1.1–4.5)
LYMPHOCYTES RELATIVE PERCENT: 20.2 % (ref 20–40)
Lab: NORMAL
MCH RBC QN AUTO: 23.8 PG (ref 27–31)
MCHC RBC AUTO-ENTMCNC: 31.1 G/DL (ref 33–37)
MCV RBC AUTO: 76.4 FL (ref 81–99)
MICROCYTES: ABNORMAL
MONOCYTES ABSOLUTE: 0.7 K/UL (ref 0–0.9)
MONOCYTES RELATIVE PERCENT: 7.2 % (ref 0–10)
NEUTROPHILS ABSOLUTE: 6.4 K/UL (ref 1.5–7.5)
NEUTROPHILS RELATIVE PERCENT: 71.2 % (ref 50–65)
NITRITE, URINE: NEGATIVE
OPIATE SCREEN URINE: NEGATIVE
OVALOCYTES: ABNORMAL
PDW BLD-RTO: 27.4 % (ref 11.5–14.5)
PH UA: 7 (ref 5–8)
PLATELET # BLD: 348 K/UL (ref 130–400)
PLATELET SLIDE REVIEW: ADEQUATE
PMV BLD AUTO: 10.1 FL (ref 9.4–12.3)
POIKILOCYTES: ABNORMAL
POLYCHROMASIA: ABNORMAL
POTASSIUM SERPL-SCNC: 3.6 MMOL/L (ref 3.5–5)
PROTEIN UA: NEGATIVE MG/DL
RBC # BLD: 5.05 M/UL (ref 4.2–5.4)
RBC UA: 1 /HPF (ref 0–4)
SARS-COV-2, NAAT: NOT DETECTED
SODIUM BLD-SCNC: 140 MMOL/L (ref 136–145)
SPECIFIC GRAVITY UA: 1.01 (ref 1–1.03)
TARGET CELLS: ABNORMAL
TOTAL PROTEIN: 7.8 G/DL (ref 6.6–8.7)
UROBILINOGEN, URINE: 0.2 E.U./DL
VALPROIC ACID LEVEL: 38.5 UG/ML (ref 50–100)
WBC # BLD: 9.1 K/UL (ref 4.8–10.8)
WBC UA: 1 /HPF (ref 0–5)

## 2021-11-01 PROCEDURE — 80053 COMPREHEN METABOLIC PANEL: CPT

## 2021-11-01 PROCEDURE — 84703 CHORIONIC GONADOTROPIN ASSAY: CPT

## 2021-11-01 PROCEDURE — 6370000000 HC RX 637 (ALT 250 FOR IP): Performed by: EMERGENCY MEDICINE

## 2021-11-01 PROCEDURE — 99285 EMERGENCY DEPT VISIT HI MDM: CPT

## 2021-11-01 PROCEDURE — 87635 SARS-COV-2 COVID-19 AMP PRB: CPT

## 2021-11-01 PROCEDURE — 82077 ASSAY SPEC XCP UR&BREATH IA: CPT

## 2021-11-01 PROCEDURE — 1240000000 HC EMOTIONAL WELLNESS R&B

## 2021-11-01 PROCEDURE — 81001 URINALYSIS AUTO W/SCOPE: CPT

## 2021-11-01 PROCEDURE — 36415 COLL VENOUS BLD VENIPUNCTURE: CPT

## 2021-11-01 PROCEDURE — 80164 ASSAY DIPROPYLACETIC ACD TOT: CPT

## 2021-11-01 PROCEDURE — 80307 DRUG TEST PRSMV CHEM ANLYZR: CPT

## 2021-11-01 PROCEDURE — 6370000000 HC RX 637 (ALT 250 FOR IP): Performed by: PSYCHIATRY & NEUROLOGY

## 2021-11-01 PROCEDURE — 85025 COMPLETE CBC W/AUTO DIFF WBC: CPT

## 2021-11-01 RX ORDER — OLANZAPINE 10 MG/1
20 TABLET ORAL NIGHTLY
Status: DISCONTINUED | OUTPATIENT
Start: 2021-11-01 | End: 2021-11-02

## 2021-11-01 RX ORDER — NICOTINE 21 MG/24HR
1 PATCH, TRANSDERMAL 24 HOURS TRANSDERMAL DAILY
Status: DISCONTINUED | OUTPATIENT
Start: 2021-11-01 | End: 2021-11-09 | Stop reason: HOSPADM

## 2021-11-01 RX ORDER — ACETAMINOPHEN 325 MG/1
650 TABLET ORAL EVERY 4 HOURS PRN
Status: DISCONTINUED | OUTPATIENT
Start: 2021-11-01 | End: 2021-11-09 | Stop reason: HOSPADM

## 2021-11-01 RX ORDER — POLYETHYLENE GLYCOL 3350 17 G/17G
17 POWDER, FOR SOLUTION ORAL DAILY PRN
Status: DISCONTINUED | OUTPATIENT
Start: 2021-11-01 | End: 2021-11-09 | Stop reason: HOSPADM

## 2021-11-01 RX ORDER — DIVALPROEX SODIUM 500 MG/1
500 TABLET, EXTENDED RELEASE ORAL DAILY
Status: DISCONTINUED | OUTPATIENT
Start: 2021-11-02 | End: 2021-11-02

## 2021-11-01 RX ORDER — TRAZODONE HYDROCHLORIDE 50 MG/1
50 TABLET ORAL NIGHTLY PRN
Status: DISCONTINUED | OUTPATIENT
Start: 2021-11-01 | End: 2021-11-06

## 2021-11-01 RX ADMIN — OLANZAPINE 20 MG: 10 TABLET, FILM COATED ORAL at 23:32

## 2021-11-01 ASSESSMENT — SLEEP AND FATIGUE QUESTIONNAIRES
SLEEP PATTERN: DIFFICULTY FALLING ASLEEP;RESTLESSNESS;NIGHTMARES/TERRORS
RESTFUL SLEEP: NO
DO YOU HAVE DIFFICULTY SLEEPING: YES
DO YOU USE A SLEEP AID: YES
DIFFICULTY ARISING: NO
DIFFICULTY STAYING ASLEEP: YES
AVERAGE NUMBER OF SLEEP HOURS: 5
DIFFICULTY FALLING ASLEEP: YES

## 2021-11-01 ASSESSMENT — ENCOUNTER SYMPTOMS
BLOOD IN STOOL: 0
EYE DISCHARGE: 0
APNEA: 0
SORE THROAT: 0
SINUS PRESSURE: 0
CONSTIPATION: 0
DIARRHEA: 0
VOICE CHANGE: 0
CHOKING: 0
NAUSEA: 0
SHORTNESS OF BREATH: 0
FACIAL SWELLING: 0

## 2021-11-01 ASSESSMENT — LIFESTYLE VARIABLES: HISTORY_ALCOHOL_USE: NO

## 2021-11-01 NOTE — ED NOTES
Bed: 15  Expected date:   Expected time:   Means of arrival:   Comments:  [address], [address]  11/01/21 038

## 2021-11-02 PROBLEM — R44.3 HALLUCINATIONS: Status: ACTIVE | Noted: 2021-11-02

## 2021-11-02 PROBLEM — F20.9 SCHIZOPHRENIA (HCC): Status: ACTIVE | Noted: 2021-11-02

## 2021-11-02 LAB
GLUCOSE BLD-MCNC: 104 MG/DL (ref 70–99)
GLUCOSE BLD-MCNC: 118 MG/DL (ref 70–99)
GLUCOSE BLD-MCNC: 89 MG/DL (ref 70–99)
PERFORMED ON: ABNORMAL
PERFORMED ON: ABNORMAL
PERFORMED ON: NORMAL

## 2021-11-02 PROCEDURE — 1240000000 HC EMOTIONAL WELLNESS R&B

## 2021-11-02 PROCEDURE — 6370000000 HC RX 637 (ALT 250 FOR IP): Performed by: EMERGENCY MEDICINE

## 2021-11-02 PROCEDURE — 82947 ASSAY GLUCOSE BLOOD QUANT: CPT

## 2021-11-02 PROCEDURE — 99223 1ST HOSP IP/OBS HIGH 75: CPT | Performed by: PSYCHIATRY & NEUROLOGY

## 2021-11-02 PROCEDURE — 6370000000 HC RX 637 (ALT 250 FOR IP): Performed by: PSYCHIATRY & NEUROLOGY

## 2021-11-02 RX ORDER — DIVALPROEX SODIUM 500 MG/1
1000 TABLET, EXTENDED RELEASE ORAL DAILY
Status: DISCONTINUED | OUTPATIENT
Start: 2021-11-03 | End: 2021-11-09 | Stop reason: HOSPADM

## 2021-11-02 RX ORDER — LORAZEPAM 2 MG/ML
2 INJECTION INTRAMUSCULAR EVERY 6 HOURS PRN
Status: DISCONTINUED | OUTPATIENT
Start: 2021-11-02 | End: 2021-11-09 | Stop reason: HOSPADM

## 2021-11-02 RX ORDER — PALIPERIDONE 6 MG/1
6 TABLET, EXTENDED RELEASE ORAL DAILY
Status: DISCONTINUED | OUTPATIENT
Start: 2021-11-02 | End: 2021-11-03

## 2021-11-02 RX ORDER — HALOPERIDOL 5 MG/ML
5 INJECTION INTRAMUSCULAR EVERY 6 HOURS PRN
Status: DISCONTINUED | OUTPATIENT
Start: 2021-11-02 | End: 2021-11-09 | Stop reason: HOSPADM

## 2021-11-02 RX ADMIN — DIVALPROEX SODIUM 500 MG: 500 TABLET, FILM COATED, EXTENDED RELEASE ORAL at 10:37

## 2021-11-02 RX ADMIN — PALIPERIDONE 6 MG: 6 TABLET, EXTENDED RELEASE ORAL at 13:48

## 2021-11-02 NOTE — PROGRESS NOTES
Treatment Team Note:    SANTOS met with [address] team to discuss [address]ts [address] plans. Progress/Behavior/Group Attendance: TBD    Target Symptoms/Reason for admission:  Patient admitted to [facility] due to female with a history of schizophrenia presents with increasing hallucinations. Myra Marion is quiet when I ask if any of them are threatening to her or if she had desire to hurt herself or anyone else. Myra Marion is cooperative.  Does not seem anxious.  Denies any injury or abuse.   Diagnoses: Chronic schizophrenia, Tobacco use disorder, severe, Family relationship issues  UDS: Neg  BAL:  Neg      AftercarePlan: [address] lives with: fom    Collateral obtained from: mom  On:11/2/21    Family Session: YULY    Misc:

## 2021-11-02 NOTE — PROGRESS NOTES
Admission Note      Reason for admission/Target Symptom: Patient admitted to [facility] due to female with a history of schizophrenia presents with increasing hallucinations. She is quiet when I ask if any of them are threatening to her or if she had desire to hurt herself or anyone else. She is cooperative. Does not seem anxious. Denies any injury or abuse. Diagnoses: Depression NOS  UDS: Neg  BAL:  Neg    SW met with treatment team to discuss patient's treatment including care planning, discharge planning, and follow-up needs. Pt has been admitted to [facility]. Treatment team has identified patient's discharge needs as medication management and outpatient therapy/counseling. Pt confirmed  the need for ongoing treatment post inpatient stay. Pt was also provided a handout of contact information for drug and alcohol treatment centers and other community support service such as [facility], AA, and [facility].

## 2021-11-02 NOTE — H&P
Department of Psychiatry  Attending History and Physical - Adult         CHIEF COMPLAINT: Psychosis    History obtained from:  patient    HISTORY OF PRESENT ILLNESS:          The patient is a 41 y.o. female with previous psychiatric history of schizophrenia, who has been admitted to our psychiatric unit secondary to worsening of auditory hallucinations. Patient is well-known to psychiatry due to previous admissions to our psychiatric unit and consults, when patient was admitted to medical services. Patient has been seen in treatment team room with presence of the patient's nurse. Patient reported that she has been diagnosed with schizophrenia. She stated that she continues to experience auditory and visual hallucinations, despite the fact that she was compliant with prescribed psychotropic medications, stated \"they probably stopped working\". She describes visual hallucinations as \"I see shadows\", and described her auditory hallucinations, as multiple unfamiliar voices, noises and sounds. Patient stated when she hears the voices, they talk to her and they talk to each other and sometimes tell here that she is the bad person. She reported that last time when she experienced auditory and visual hallucinations is yesterday before admission to the hospital.  Patient denies any auditory and visual hallucinations during the interview. However, patient stated that voices getting worse recently. Patient stated that she frequently has arguments with her mother and her sister and yesterday she was involved in a verbal fight with her older sister. Patient stated that her mother and her sister recommended to come to the hospital and ask for help due to being aggressive and agitated. Today during the interview, patient reported mild feeling of depression and mild anxiety. She denies feeling of hopelessness, helplessness and worthlessness. Patient endorses good appetite and good quality of sleep at home.   She denies current active suicidal or homicidal ideations, denies any plans. PSYCHIATRIC HISTORY:    Diagnoses: Schizophrenia  Suicide attempts/gestures: Denies   Prior hospitalizations: Multiple, to a different psychiatric hospital in our [facility], including [facility] and [facility], with last admission 2-3 years ago  Medication trials: Paliperidone, Zyprexa, Depakote  Mental health contact: [facility]  Head trauma: Denies    Past Medical History:        Diagnosis Date    [facility] mellitus ([facility] [address]     Nicotine dependence     Schizophrenia ([facility] [address]      Past Surgical History:    History reviewed. No pertinent surgical history. Medications Prior to Admission:   Medications Prior to Admission: albuterol sulfate  (90 Base) MCG/ACT inhaler, Inhale 2 puffs into the lungs every 6 hours as needed for Wheezing  divalproex (DEPAKOTE ER) 500 MG extended release tablet, Take 1 tablet by mouth daily  OLANZapine (ZYPREXA) 20 MG tablet, Take 1 tablet by mouth nightly  ferrous sulfate (IRON 325) 325 (65 Fe) MG tablet, Take 1 tablet by mouth 2 times daily (with meals)  docusate sodium (COLACE) 100 MG capsule, TAKE 1 CAPSULE BY MOUTH TWICE DAILY  metFORMIN (GLUCOPHAGE) 500 MG tablet, TAKE 1 TABLET BY MOUTH TWICE DAILY WITH MEALS  vitamin D (ERGOCALCIFEROL) 1.25 MG (50456 UT) CAPS capsule, TAKE 1 CAPSULE BY MOUTH 1 TIME A WEEK AFTER A MEAL  INCRUSE ELLIPTA 62.5 MCG/INH AEPB, INHALE 1 PUFF EVERY DAY AS DIRECTED  tiotropium (SPIRIVA RESPIMAT) 2.5 MCG/ACT AERS inhaler, Inhale 2 puffs into the lungs daily  loratadine (CLARITIN) 10 MG tablet, TAKE 1 TABLET BY MOUTH DAILY    Allergies:  Patient has no known allergies. Social History:  Patient is on disability, she lives with her mother.   Smoking-3 PPD  Alcohol-currently denies, reported history of abusing alcohol in the past.  Illicit drugs-denies    Family History:       Problem Relation Age of Onset    Heart Disease Mother      Psychiatric Family History  Patient is uncertain of psychiatric family history    REVIEW OF SYSTEMS:  General: Endorses mild feeling of anxiety and depression. No fevers, chills, night sweats, no recent weight loss or gain. Head: No headache, no migraine. Eyes: No recent visual changes. Ears: No recent hearing changes. Nose: No increased congestion or change in sense of smell. Throat: No sore throat, no trouble swallowing. Cardiovascular: No chest pain or palpitations, or dizziness. Respiratory: No cough, wheezes, congestion, or shortness of breath. Gastrointestinal: No abdominal pain, nausea or vomiting, no diarrhea or constipation. Musculo-skeletal: No edema, deformities, or loss of functions. Neurological: No loss of consciousness, abnormal sensations, or weakness. Skin: No rash, abrasions or bruises. PHYSICAL EXAM:    Vitals:  BP (!) 150/90   Pulse 100   Temp 98.5 °F (36.9 °C)   Resp 16   Ht 5' 2\" (1.575 m)   Wt 187 lb 6.4 oz (85 kg)   SpO2 99%   BMI 34.28 kg/m²     Mental Status Examination:   Appearance: Appropriately groomed, wearing hospital attire. Made intermittent eye contact. Behavior: Slightly withdrawn, calm, cooperative with interview. Mild psychomotor retardation appreciated. Gait unremarkable. Speech: Normal in tone and quality, slightly decreased in volume. Mood: \"Fine\"   Affect: Mood congruent. Range is flat and restricted. Thought Process: Mostly circumstantial  Thought Content: Patient does not have any current active suicidal and   homicidal ideations. No overt delusions or paranoia appreciated. Perceptions: Seems patient does not have any auditory or visual hallucinations at present time. Patient did not appear to be responding to internal stimuli. Executive Functions: Appear mildly impaired. Concentration: Decreased  Reasoning: Appears impaired based on interaction from interview   Orientation: to person, place and situation. Alert. Language: Intact.    Fund of information: Intact. Memory: recent and remote appear intact. Impulsivity: Limited  Neurovegitative: Fair appetite, fair sleep. Insight: Impaired  Judgment: Impaired    Physical Exam:  GENERAL APPEARANCE: 41y.o. year-old female in NAD   HEAD: Normocephalic, atraumatic. THROAT: No erythema, exudates, lesions. No tongue laceration. CARDIOVASCULAR: PMI nondisplaced. Regular rhythm and rate. Normal S1 and S2.  PULMONARY: Clear to auscultation bilaterally, no tenderness to palpation. ABDOMEN: Soft, obese, nontender, nondistended. MUSCULOSKELTAL: No obvious deformities, clubbing, cyanosis or edema, no spinous process or paraspinous tenderness, normal ROM, normal gait, distal pulses intact symmetric 1+ bilaterally. NEUROLOGICAL: Alert, oriented x 3, CN II-XII grossly intact, motor strength 3-4/5 all muscle groups, DTR 1+ intact and symmetric, sensation intact to sharp and dull. No abnormal movements or tremors.    SKIN: Warm, dry, intact, no rash, abrasions or bruises      DATA:  Labs:    CBC with Differential:    Lab Results   Component Value Date    WBC 9.1 11/01/2021    RBC 5.05 11/01/2021    HGB 12.0 11/01/2021    HCT 38.6 11/01/2021     11/01/2021    MCV 76.4 11/01/2021    MCH 23.8 11/01/2021    MCHC 31.1 11/01/2021    RDW 27.4 11/01/2021    BANDSPCT 2 09/20/2021    LYMPHOPCT 20.2 11/01/2021    MONOPCT 7.2 11/01/2021    MYELOPCT 1 09/22/2021    BASOPCT 0.4 11/01/2021    MONOSABS 0.70 11/01/2021    LYMPHSABS 1.8 11/01/2021    EOSABS 0.00 11/01/2021    BASOSABS 0.00 11/01/2021     CMP:    Lab Results   Component Value Date     11/01/2021    K 3.6 11/01/2021    K 3.4 09/20/2021     11/01/2021    CO2 25 11/01/2021    BUN 5 11/01/2021    CREATININE 0.6 11/01/2021    GFRAA >59 11/01/2021    LABGLOM >60 11/01/2021    GLUCOSE 134 11/01/2021    PROT 7.8 11/01/2021    PROT 8.0 03/12/2013    LABALBU 4.1 11/01/2021    CALCIUM 9.7 11/01/2021    BILITOT <0.2 11/01/2021    ALKPHOS 87 11/01/2021 AST 8 11/01/2021    ALT 8 11/01/2021     Lab Results   Component Value Date    VALPROATE 38.5 (L) 11/01/2021       DSM 5 DIAGNOSIS:  Chronic schizophrenia  Tobacco use disorder, severe  Family relationship issues    Plan:   -Admit to [facility] adult Unit and monitor on 15 minute checks  -Penny Glassing reviewed. -Gather collateral information from family with release  -Medical monitoring to be performed by Dr. Dawson Gilliam and associates  -Acclimate to the unit. -Encourage participation in groups and therapeutic activities as appropriate.  -Medications: Will restart previously prescribed Depakote ER but will increase dose of Depakote from 500 mg once a day to 1000 mg once a day for mood stabilization. Patient's Depakote level in ER was significantly below therapeutic level -38.5. Will discontinue Zyprexa due to negative effect of smoking for concentration of medication, and we will start on Invega 6 mg daily for psychosis.   Nicotine patch 21 mg / 24 hours for nicotine replacement.    -The risks, benefits, side effects, indications, contraindications, and adverse effects of the medications have been discussed.  -The patient has verbalized understanding and has capacity to give informed consent.  -SW help evaluating home environment.   -Discuss with treatment team.

## 2021-11-02 NOTE — PLAN OF CARE
Group Therapy Note     Date: 11/2/2021  Start Time: 1100  End Time:  4039  Number of Participants: 7     Type of Group: Psychoeducation     Wellness Binder Information  Module Name:  emotional wellness  Session Number:  1     Patient's Goal:  validation of feelings     Notes:  pt was verbally prompted to attend group. Pt refused. Information about emotional wellness was provided. Status After Intervention:       Participation Level:      Participation Quality:         Speech:           Thought Process/Content:         Affective Functioning:         Mood:         Level of consciousness:          Response to Learning:         Endings:      Modes of Intervention:         Discipline Responsible: Psychoeducational Specialist        Signature:  Marta Erwin

## 2021-11-02 NOTE — BH NOTE
[facility] ADULT INITIAL INTAKE ASSESSMENT     11/1/21    Radha Hunter ,a 41 y.o. female, presents to the ED for a psychiatric assessment. ED Arrival time: [address]  ED physician: Godfrey Lugo  [facility] Notification time: 1935  JOSEPH Assessment start time: 2045 [facility] on ADULT  Psychiatrist call time: 2125  Spoke with Dr. Tosha Yeboah    Patient is referred by: EMS    Reason for visit to ED - Presenting problem:     PT states reason for ED visit, \"I got in to a verbal argument with my 49 yo sister. I wanted to fight her. I wanted to hurt her. I was hearing voices. I have been hearing voices since 2003. I was diagnosed with Schizophrenia then. I hear them right now. They are mingled. I can't understand what they are saying. The voices were telling me to hurt my sister. I also see shadows. I can't make out what they are. The voices are getting worse. I can't handle it. I take my medicine every day. \"  Patient denies SI and HI at this time. Duration of symptoms: Worsening over the last month    Current Stressors: family    C-SSRS Completed: yes    SI:  denies   Plan: no  Past SI attempts: yes   If yes, when and how many times:  2002 and 2003 cut wrists  Describe suicide attempts:  Cut wrists  HI: denies  If yes describe:   Delusions: denies  If yes describe:   Hallucinations: admits to   If yes describe: see above  Risk of Harm to self: Self injurious/self mutilation behaviors  no   If yes explain:   Was it within the past 6 months: no   Risk of Harm to others: yes   If yes explain: see above  Was it within the past 6 months: yes     Trauma History:  Family friends dying. Anxiety 1-10:  10  Explain if increased:   Depression 1-10:  10  Explain if increased:   Level of function outside hospital decreased: no   If yes explain:       Psychiatric Hospitalizations: Yes   Where & When: multiple times at [facility]. Last in May, 2017. Multiple times in [facility].   The last time at [facility] was 2018  Outpatient Psychiatric Treatment: [facility]    Family History:    Family history of mental illness: no   Family members with suicide attempt: no   If yes explain (attempted or completed):    Substance Abuse History:     SBIRT Completed: yes  Brief Intervention completed if needed:  (Yes/No)    Current ETOH LEVELS: < 10    ETOH Usage:     Amount drinking daily: denied    Date of last drink:   Longest period of sobriety:    Substance/Chemical Abuse/Recreational Drug History:  Substance used: denies  Date of last substance use: Tobacco Use: yes 3 packs daily for 18 years  History of rehab treatment:  no  How many times in rehab:0  Last time in rehab:  Family history of substance abuse:  No    Opiates: It was discussed with pt they would not be receiving opiates unless they were within 3 days post surgery/acute injury. Patient voiced understanding and agreed. Psychiatric Review Of Systems:     Recent Sleep changes: yes   Bad dreams. About 5-7 hours  Recent appetite changes: no   Recent weight changes/Pounds gained (+) or lost (-): no      Medical History:     Medical Diagnosis/Issues: Diabetes  CT today in ED:  no  Use of 02 or CPAP: no  Ambulatory: yes  Independent or Need assistance with Self Care: Independent    PCP: BARBARA Bolanos     Current Medications:   Scheduled Meds: No current facility-administered medications for this encounter.     Current Outpatient Medications:     albuterol sulfate  (90 Base) MCG/ACT inhaler, Inhale 2 puffs into the lungs every 6 hours as needed for Wheezing, Disp: 8.5 g, Rfl: 2    divalproex (DEPAKOTE ER) 500 MG extended release tablet, Take 1 tablet by mouth daily, Disp: 30 tablet, Rfl: 5    OLANZapine (ZYPREXA) 20 MG tablet, Take 1 tablet by mouth nightly, Disp: 30 tablet, Rfl: 5    ferrous sulfate (IRON 325) 325 (65 Fe) MG tablet, Take 1 tablet by mouth 2 times daily (with meals), Disp: 30 tablet, Rfl: 0    docusate sodium (COLACE) 100 MG capsule, TAKE 1 CAPSULE BY MOUTH TWICE DAILY, Disp: 60 capsule, Rfl: 11    metFORMIN (GLUCOPHAGE) 500 MG tablet, TAKE 1 TABLET BY MOUTH TWICE DAILY WITH MEALS, Disp: 60 tablet, Rfl: 5    vitamin D (ERGOCALCIFEROL) 1.25 MG (29787 UT) CAPS capsule, TAKE 1 CAPSULE BY MOUTH 1 TIME A WEEK AFTER A MEAL, Disp: 4 capsule, Rfl: 5    INCRUSE ELLIPTA 62.5 MCG/INH AEPB, INHALE 1 PUFF EVERY DAY AS DIRECTED, Disp: 30 each, Rfl: 0    tiotropium (SPIRIVA RESPIMAT) 2.5 MCG/ACT AERS inhaler, Inhale 2 puffs into the lungs daily, Disp: 1 Inhaler, Rfl: 2    loratadine (CLARITIN) 10 MG tablet, TAKE 1 TABLET BY MOUTH DAILY, Disp: 30 tablet, Rfl: 3     Mental Status Evaluation:     Appearance:  disheveled and overweight   Behavior:  Within Normal Limits   Speech:  soft   Mood:  anxious and depressed   Affect:  mood-congruent   Thought Process:  circumstantial   Thought Content:  hallucinations   Sensorium:  person, place, time/date, situation, day of week, month of year and year   Cognition:  grossly intact   Insight:  good       Collateral Information:     Name: Angelika Fleeting  Relationship: mother  Phone Number: [phone]  Collateral: sister's name Na Infante gets agitated, mostly over cigarettes. She smokes a lot and drinks coffee and soda. Today, we argued and I called the crisis line and they sent her here. Current living arrangement:  Lives with mother and cousin with kids  Current Support System:  mother  Employment:  disability    Disposition:     Choose one of the options below for disposition:     1.  Decision to admit to [address] [place] [address]:  yes    If yes, which unit Adult or Geriatric Unit:  Adult  Is patient voluntary: yes  If no has a 72 hold been initiated:   Admission Diagnosis: Hallucinations    Does the patient have a guardian or Medical POA: No  Has the guardian been notified or Medical POA:         Evelyne Esqueda RN

## 2021-11-02 NOTE — PLAN OF CARE
Group Therapy Note     Date: 11/2/2021  Start Time: 7945  End Time:  1600  Number of Participants: 10     Type of Group: Recovery     Wellness Binder Information  Module Name:  emotional wellness  Session Number:  5     Patient's Goal:  obstacles to emotional wellness     Notes:  pt was verbally prompted to attend group. Pt refused. Information about obstacles to wellness was provided. Status After Intervention:       Participation Level:      Participation Quality:         Speech:           Thought Process/Content:         Affective Functioning:         Mood:         Level of consciousness:          Response to Learning:         Endings:      Modes of Intervention:         Discipline Responsible: Psychoeducational Specialist        Signature:  Helen Santoro

## 2021-11-02 NOTE — PROGRESS NOTES
Group Therapy Note    Start Time: 800  End Time:  692  Number of Participants: 7    Type of Group: Community Meeting       Patient's Goal:        Notes:  Patient didn't attend group today    Participation Level:  Active Listener       Participation Quality: Appropriate      Thought Process/Content: Logical      Affective Functioning: Congruent      Mood: calm      Level of consciousness:  Alert      Modes of Intervention: Support      Discipline Responsible: Behavioral Health Tech II      Signature:  Huseyin Anderson

## 2021-11-02 NOTE — ED PROVIDER NOTES
[facility] EMERGENCY DEPT  eMERGENCY dEPARTMENT eNCOUnter      Pt Name: Miguel A Lowery  MRN: [id number]  [place] 1975  Date of evaluation: 11/1/2021  Provider: Tatiana Denny MD    CHIEF COMPLAINT       Chief Complaint   Patient presents with    Mental Health Problem         HISTORY OF PRESENT ILLNESS   (Location/Symptom, Timing/Onset,Context/Setting, Quality, Duration, Modifying Factors, Severity)  Note limiting factors. Miguel A Lowery is a 41 y.o. female who presents to the emergency department behavioral health evaluation    60-year-old female with a history of schizophrenia presents with increasing hallucinations. She is quiet when I ask if any of them are threatening to her or if she had desire to hurt herself or anyone else. She is cooperative. Does not seem anxious. Denies any injury or abuse. The history is provided by the patient and medical records. NursingNotes were reviewed. REVIEW OF SYSTEMS    (2-9 systems for level 4, 10 or more for level 5)     Review of Systems   Constitutional: Negative for chills and fever. HENT: Negative for drooling, facial swelling, nosebleeds, sinus pressure, sore throat and voice change. Eyes: Negative for discharge. Respiratory: Negative for apnea, choking and shortness of breath. Cardiovascular: Negative for chest pain and leg swelling. Gastrointestinal: Negative for blood in stool, constipation, diarrhea and nausea. Genitourinary: Negative for dysuria and enuresis. Musculoskeletal: Negative for joint swelling. Skin: Negative for rash and wound. Neurological: Negative for seizures and syncope. Psychiatric/Behavioral: Positive for hallucinations. Negative for behavioral problems and suicidal ideas. All other systems reviewed and are negative. A complete review of systems was performed and is negative except as noted above in the HPI.        PAST MEDICAL HISTORY     Past Medical History:   Diagnosis Date    Diabetes mellitus ([facility]  Nicotine dependence     Schizophrenia ([facility] [address]          SURGICAL HISTORY     No past surgical history on file. CURRENT MEDICATIONS       Previous Medications    ALBUTEROL SULFATE  (90 BASE) MCG/ACT INHALER    Inhale 2 puffs into the lungs every 6 hours as needed for Wheezing    DIVALPROEX (DEPAKOTE ER) 500 MG EXTENDED RELEASE TABLET    Take 1 tablet by mouth daily    DOCUSATE SODIUM (COLACE) 100 MG CAPSULE    TAKE 1 CAPSULE BY MOUTH TWICE DAILY    FERROUS SULFATE (IRON 325) 325 (65 FE) MG TABLET    Take 1 tablet by mouth 2 times daily (with meals)    INCRUSE ELLIPTA 62.5 MCG/INH AEPB    INHALE 1 PUFF EVERY DAY AS DIRECTED    LORATADINE (CLARITIN) 10 MG TABLET    TAKE 1 TABLET BY MOUTH DAILY    METFORMIN (GLUCOPHAGE) 500 MG TABLET    TAKE 1 TABLET BY MOUTH TWICE DAILY WITH MEALS    OLANZAPINE (ZYPREXA) 20 MG TABLET    Take 1 tablet by mouth nightly    TIOTROPIUM (SPIRIVA RESPIMAT) 2.5 MCG/ACT AERS INHALER    Inhale 2 puffs into the lungs daily    VITAMIN D (ERGOCALCIFEROL) 1.25 MG (23116 UT) CAPS CAPSULE    TAKE 1 CAPSULE BY MOUTH 1 TIME A WEEK AFTER A MEAL       ALLERGIES     Patient has no known allergies.     FAMILY HISTORY       Family History   Problem Relation Age of Onset    Heart Disease Mother           SOCIAL HISTORY       Social History     Socioeconomic History    Marital status: Single     Spouse name: Not on file    Number of children: Not on file    Years of education: Not on file    Highest education level: Not on file   Occupational History    Not on file   Tobacco Use    Smoking status: Current Every Day Smoker     Packs/day: 4.00     Types: Cigarettes    Smokeless tobacco: Never Used   Vaping Use    Vaping Use: Never used   Substance and Sexual Activity    Alcohol use: Not Currently     Comment: Rare    Drug use: No    Sexual activity: Not on file   Other Topics Concern    Not on file   Social History Narrative    Not on file     Social Determinants of Health Financial Resource Strain: Low Risk     Difficulty of Paying Living Expenses: Not hard at all   Food Insecurity: No Food Insecurity    Worried About Running Out of Food in the Last Year: Never true    Talita of Food in the Last Year: Never true   Transportation Needs: No Transportation Needs    Lack of Transportation (Medical): No    Lack of Transportation (Non-Medical): No   Physical Activity:     Days of Exercise per Week:     Minutes of Exercise per Session:    Stress:     Feeling of Stress :    Social Connections:     Frequency of Communication with Friends and Family:     Frequency of Social Gatherings with Friends and Family:     Attends Restorationism Services:     Active Member of Clubs or Organizations:     Attends Club or Organization Meetings:     Marital Status:    Intimate Partner Violence:     Fear of Current or Ex-Partner:     Emotionally Abused:     Physically Abused:     Sexually Abused:        SCREENINGS             PHYSICAL EXAM    (up to 7 for level 4, 8 or more for level 5)     ED Triage Vitals [11/01/21 1848]   BP Temp Temp Source Pulse Resp SpO2 Height Weight   129/81 98 °F (36.7 °C) Oral 100 18 98 % 4' 11\" (1.499 m) 189 lb (85.7 kg)       Physical Exam  Vitals and nursing note reviewed. Constitutional:       General: She is not in acute distress. Appearance: She is well-developed. Comments: Clean and dressed   HENT:      Head: Normocephalic and atraumatic. Right Ear: External ear normal.      Left Ear: External ear normal.   Eyes:      General: No scleral icterus. Conjunctiva/sclera: Conjunctivae normal.      Pupils: Pupils are equal, round, and reactive to light. Cardiovascular:      Rate and Rhythm: Normal rate and regular rhythm. Heart sounds: Normal heart sounds. Pulmonary:      Effort: Pulmonary effort is normal.   Musculoskeletal:         General: Normal range of motion. Cervical back: Normal range of motion and neck supple.    Skin: General: Skin is warm and dry. Neurological:      General: No focal deficit present. Mental Status: She is alert and oriented to person, place, and time. Psychiatric:         Mood and Affect: Affect is flat. Speech: Speech is delayed. Behavior: Behavior is cooperative. Thought Content: Thought content is delusional. Thought content is not paranoid. Thought content does not include homicidal or suicidal ideation.          DIAGNOSTIC RESULTS     EKG: All EKG's are interpreted by the Emergency Department Physician who either signs or Co-signs this chart in the absence of a cardiologist.        RADIOLOGY:   Non-plain film images such as CT, Ultrasound and MRI are read by the radiologist. Desean Abad images are visualized and preliminarily interpreted by the emergency physician with the below findings:        Interpretation per the Radiologist below, if available at the time of this note:    No orders to display         ED BEDSIDE ULTRASOUND:   Performed by ED Physician - none    LABS:  Labs Reviewed   CBC WITH AUTO DIFFERENTIAL - Abnormal; Notable for the following components:       Result Value    MCV 76.4 (*)     MCH 23.8 (*)     MCHC 31.1 (*)     RDW 27.4 (*)     Neutrophils % 71.2 (*)     Anisocytosis 1+ (*)     Microcytes 1+ (*)     Polychromasia 1+ (*)     Hypochromia 1+ (*)     Poikilocytes 1+ (*)     Ovalocytes Occasional (*)     Target Cells Occasional (*)     All other components within normal limits   COMPREHENSIVE METABOLIC PANEL - Abnormal; Notable for the following components:    Glucose 134 (*)     BUN 5 (*)     All other components within normal limits   URINE RT REFLEX TO CULTURE - Abnormal; Notable for the following components:    Blood, Urine TRACE (*)     All other components within normal limits   MICROSCOPIC URINALYSIS - Abnormal; Notable for the following components:    Bacteria, UA NEGATIVE (*)     Crystals, UA NEG (*)     All other components within normal limits   VALPROIC ACID LEVEL, TOTAL - Abnormal; Notable for the following components:    Valproic Acid Lvl 38.5 (*)     All other components within normal limits   COVID-19, RAPID   ETHANOL   HCG, SERUM, QUALITATIVE   URINE DRUG SCREEN       All other labs were within normal range or not returned as of this dictation. EMERGENCY DEPARTMENT COURSE and DIFFERENTIALDIAGNOSIS/MDM:   Vitals:    Vitals:    11/01/21 1848 11/01/21 2138   BP: 129/81 122/84   Pulse: 100 86   Resp: 18 14   Temp: 98 °F (36.7 °C)    TempSrc: Oral    SpO2: 98% 98%   Weight: 189 lb (85.7 kg)    Height: 4' 11\" (1.499 m)        MDM  Number of Diagnoses or Management Options  Schizophrenia, chronic condition with acute exacerbation ([facility] [address]  Diagnosis management comments: I consulted with psychiatry they were able to get more from the patient and that hallucinations are telling her to hurt her sister. She has had schizophrenia and hallucinations since 2003 however the symptoms have exacerbated. They are going to accept the patient for admission on behavioral health. CONSULTS:  IP CONSULT TO PSYCHIATRY    PROCEDURES:  Unless otherwise notedbelow, none     Procedures    FINAL IMPRESSION     1.  Schizophrenia, chronic condition with acute exacerbation [facility])          DISPOSITION/PLAN   DISPOSITION Decision To Admit 11/01/2021 09:37:17 PM      PATIENT REFERRED TO:  @FUP@    DISCHARGE MEDICATIONS:  New Prescriptions    No medications on file          (Please note that portions of this note were completed with a voice recognition program.  Efforts were made to edit the dictations butoccasionally words are mis-transcribed.)    Vy Prince MD (electronically signed)  AttendingEmergency Physician          Manuel Stockton MD  11/01/21 3348

## 2021-11-02 NOTE — PROGRESS NOTES
BHI Daily Shift Assessment  Nursing Progress Note    [place] [facility] [place] [facility] [place] Name: Tye Rosario Age: 41 y.o. Gender: female   Dx: <principal problem not specified>  Precautions: suicide risk  Target Symptoms:   Accu-Chek: NoSleep: Yes,Sleep Quality Very good SI No AVH denies   [address] [place] [address]  ADLs: No Speech: normal Depression:russel Anxiety: russel  Participation LevelNone  Appetite: Poor  Respiratory symptoms: No Headache: No Body aches: No Fever: No Cough: No  Patients encouraged to wear masks, wash hands frequently and practice social distancing while on the unit: Yes   Visitation: No   Participation QualityResistant    Complaints:none    Notes: alert to person, intermittent eye contact, poor concentration, lethargic, withdrawn and isolated to room. Disheveled and poorly groomed. Poor appetite and excessive sleep. Affect is flat and restricted. Thought processes are circumstantial. Not social and not participating or attending groups. Denies any auditory or visual hallucinations at this time.     Signature: Electronically signed by Debra Claros RN on 11/2/21 at 2:42 PM CDT
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
## 2021-11-02 NOTE — PLAN OF CARE
Problem: Anger Management/Homicidal Ideation:  Goal: Able to display appropriate communication and problem solving  Outcome: Ongoing  Goal: Ability to verbalize frustrations and anger appropriately will improve  Outcome: Ongoing  Goal: Absence of angry outbursts  Outcome: Ongoing  Goal: Absence of homicidal ideation  Outcome: Ongoing  Goal: Participates in care planning  Outcome: Ongoing     Problem: Risk of Harm:  Goal: Ability to remain free from injury will improve  Outcome: Ongoing

## 2021-11-02 NOTE — PROGRESS NOTES
Behavioral Services  Medicare Certification Upon Admission    I certify that this patient's inpatient psychiatric hospital admission is medically necessary for:    [x] (1) Treatment which could reasonably be expected to improve this patient's condition,       [x] (2) Or for diagnostic study;     AND     [x](2) The inpatient psychiatric services are provided while the individual is under the care of a physician and are included in the individualized plan of care.     Estimated length of stay/service 3-7 days    Plan for post-hospital care TBD    Electronically signed by Leann Harding MD on 11/2/2021 at 11:45 AM

## 2021-11-03 LAB
CHOLESTEROL, TOTAL: 159 MG/DL (ref 160–199)
GLUCOSE BLD-MCNC: 129 MG/DL (ref 70–99)
GLUCOSE BLD-MCNC: 87 MG/DL (ref 70–99)
GLUCOSE BLD-MCNC: 91 MG/DL (ref 70–99)
HBA1C MFR BLD: 5.4 % (ref 4–6)
HDLC SERPL-MCNC: 25 MG/DL (ref 65–121)
LDL CHOLESTEROL CALCULATED: 108 MG/DL
PERFORMED ON: ABNORMAL
PERFORMED ON: NORMAL
PERFORMED ON: NORMAL
TRIGL SERPL-MCNC: 131 MG/DL (ref 0–149)
TSH REFLEX FT4: 1.69 UIU/ML (ref 0.35–5.5)
VITAMIN B-12: 363 PG/ML (ref 211–946)
VITAMIN D 25-HYDROXY: 30.2 NG/ML

## 2021-11-03 PROCEDURE — 82306 VITAMIN D 25 HYDROXY: CPT

## 2021-11-03 PROCEDURE — 83036 HEMOGLOBIN GLYCOSYLATED A1C: CPT

## 2021-11-03 PROCEDURE — 82947 ASSAY GLUCOSE BLOOD QUANT: CPT

## 2021-11-03 PROCEDURE — 6370000000 HC RX 637 (ALT 250 FOR IP): Performed by: EMERGENCY MEDICINE

## 2021-11-03 PROCEDURE — 36415 COLL VENOUS BLD VENIPUNCTURE: CPT

## 2021-11-03 PROCEDURE — 82607 VITAMIN B-12: CPT

## 2021-11-03 PROCEDURE — 84443 ASSAY THYROID STIM HORMONE: CPT

## 2021-11-03 PROCEDURE — 6370000000 HC RX 637 (ALT 250 FOR IP): Performed by: PSYCHIATRY & NEUROLOGY

## 2021-11-03 PROCEDURE — 80061 LIPID PANEL: CPT

## 2021-11-03 PROCEDURE — 6370000000 HC RX 637 (ALT 250 FOR IP): Performed by: FAMILY MEDICINE

## 2021-11-03 PROCEDURE — 1240000000 HC EMOTIONAL WELLNESS R&B

## 2021-11-03 PROCEDURE — 99233 SBSQ HOSP IP/OBS HIGH 50: CPT | Performed by: PSYCHIATRY & NEUROLOGY

## 2021-11-03 RX ORDER — CHOLECALCIFEROL (VITAMIN D3) 125 MCG
500 CAPSULE ORAL DAILY
Status: DISCONTINUED | OUTPATIENT
Start: 2021-11-03 | End: 2021-11-09 | Stop reason: HOSPADM

## 2021-11-03 RX ADMIN — CYANOCOBALAMIN TAB 500 MCG 500 MCG: 500 TAB at 11:06

## 2021-11-03 RX ADMIN — PALIPERIDONE 6 MG: 6 TABLET, EXTENDED RELEASE ORAL at 07:55

## 2021-11-03 RX ADMIN — DIVALPROEX SODIUM 1000 MG: 500 TABLET, EXTENDED RELEASE ORAL at 07:55

## 2021-11-03 NOTE — PROGRESS NOTES
Pt has been asleep all night without c/o's. Will continue to monitor via 15 minute rounds.     Electronically signed by Stacy Glover RN on 11/3/2021 at 5:07 AM

## 2021-11-03 NOTE — PLAN OF CARE
Problem: Anger Management/Homicidal Ideation:  Goal: Able to display appropriate communication and problem solving  11/3/2021 1607 by [facility]  Outcome: Ongoing                                                                       Group Therapy Note    Date: 11/3/2021  Start Time: 9436  End Time:  1600  Number of Participants: 9    Type of Group: Recovery    Wellness Binder Information  Module Name:  relapse prevention  Session Number:  2    Patient's Goal:  identifying early warning signs    Notes:  pt acknowledged negative thinking as an early warning sign to help prevent relapse.     Status After Intervention:  Improved    Participation Level: Interactive    Participation Quality: Appropriate, Attentive, and Sharing      Speech:  normal      Thought Process/Content: Logical      Affective Functioning: Congruent      Mood: congruent      Level of consciousness:  Alert, Oriented x4, and Attentive      Response to Learning: Able to verbalize current knowledge/experience      Endings: None Reported    Modes of Intervention: Education      Discipline Responsible: Psychoeducational Specialist      Signature:  [facility]

## 2021-11-03 NOTE — PLAN OF CARE
Problem: Anger Management/Homicidal Ideation:  Goal: Able to display appropriate communication and problem solving  Outcome: Ongoing      Group Therapy Note     Date: 11/3/2021  Start Time: 1100  End Time:  1130  Number of Participants: 14     Type of Group: Psychoeducation     Wellness Binder Information  Module Name:  Staying well  Session Number:  1     Patient's Goal:  daily maintenance and coping skills     Notes:  pt acknowledged use of positive coping skills daily to help stay well.      Status After Intervention:  Improved     Participation Level: Interactive     Participation Quality: Appropriate, Attentive, and Sharing        Speech:  congruent        Thought Process/Content: Logical        Affective Functioning: Congruent        Mood: congruent        Level of consciousness:  Alert, Oriented x4, and Attentive        Response to Learning: Able to verbalize current knowledge/experience        Endings: None Reported     Modes of Intervention: Education        Discipline Responsible: Psychoeducational Specialist        Signature:  Ozzie Vasquez

## 2021-11-03 NOTE — PROGRESS NOTES
BHI Daily Shift Assessment  Nursing Progress Note    [facility]    Room: [facility]    Name: Blanca Loredo  Age: 41   Gender: F   Dx: Hallucinations  Precautions: suicide  Target Symptoms: suicidal thought   Accu-Chek: no  Sleep Quality: 8 hrs  SI: no  HI: no  AVH: Voices, unclear. ADLs: yes  Speech: clear, quiet  Depression: moderate  Anxiety: moderate  Participation Quality: low  Appetite: 75%  Respiratory symptoms: no  Headache: no  Body aches: no  Fever: no  Cough: no  Patients encouraged to wear masks / wash hands frequently / practice social distancing: yes  Visitation: n/a  Complaints: no    Notes: Patient cooperative, verbally minimal today, medication compliant, participating in activities, self care done, reports no problems.     Signature: Leonardo Pace RN

## 2021-11-03 NOTE — PROGRESS NOTES
Group Note  Group time 20;00 to 20:30    Number of Participants in Group: 12  Number of Patients on Unit:14      Patient attended group:Yes  Reason for Absence:  Intervention for patient absence:        Type of Group:   Wrap-Up/Relaxation    Patient's Goal: See wrap up group sheet    Participation Level:     Withdrawn      Patient's Behavior: Anxious    Is Patient Social/Interacting: No    Relaxation:   Television:No   Reading:No   Game/Puzzle:No   Phone: No       Notes/Comments:Pt spent most of tis shift in bed she came out group did not speak and went back to bed    Please see patient's wrap up group sheet for patient's comments

## 2021-11-03 NOTE — BH NOTE
Group Therapy Note    Date: 11/3/2021  Start Time: 1330  End Time:  1400  Number of Participants: 11    Type of Group: Spirituality    Wellness Binder Information  Module Name:  Mindfulness  Session Number:      Patient's Goal:  To rest the mind by focusing on the present moment through awareness utilizing the senses    Notes:      Status After Intervention:  Improved    Participation Level:  Active Listener and Interactive    Participation Quality: Appropriate, Attentive and Sharing      Speech:  normal      Thought Process/Content:       Affective Functioning: Congruent      Mood: euthymic, calm      Level of consciousness:  Oriented x4 and Attentive      Response to Learning: Able to verbalize current knowledge/experience and Capable of insight      Endings:     Modes of Intervention: Education, Exploration and Activity      Discipline Responsible:       Signature:  Marcos Bautista MA [facility]

## 2021-11-03 NOTE — PROGRESS NOTES
Group Therapy Note    Start Time: 800  End Time:  900  Number of Participants: 15    Type of Group: Community Meeting       Patient's Goal:  \"Okay I problems with life\"      Notes:        Participation Level:  Active Listener       Participation Quality: Appropriate      Thought Process/Content: Logical      Affective Functioning: Congruent      Mood: Calm      Level of consciousness:  Alert      Modes of Intervention: Support      Discipline Responsible: Behavioral Health Tech II      Signature:  Adelina Naranjo

## 2021-11-03 NOTE — PROGRESS NOTES
Treatment Team Note:     SANTOS met with [address] team to discuss Pts TX and DC plans.      Progress/Behavior/Group Attendance: TBD     Target Symptoms/Reason for admission:  Patient admitted to [facility] due to female with a history of schizophrenia presents with increasing hallucinations. Elio Plata is quiet when I ask if any of them are threatening to her or if she had desire to hurt herself or anyone else. Elio Plata is cooperative.  Does not seem anxious.  Denies any injury or abuse.   Diagnoses: Chronic schizophrenia, Tobacco use disorder, severe, Family relationship issues  UDS: Neg  BAL:  Neg        AftercarePlan: [address]     Pt lives with: fom     Collateral obtained from: mom  On:11/2/21     Family Session: YULY     Misc:

## 2021-11-03 NOTE — PROGRESS NOTES
Requirement Note     SW met with pt to complete Psychosocial and CSSR-S on this date. Patients long and short term goals discussed. Patient voiced understanding. Treatment plan sheet signed. Patient verbalized understanding of the treatment plan. Patient participated in goals and objectives of the treatment plan. Patient completed safety plan with , patient received copy of plan, and original was placed into patient's chart. SW explained treatment goals with pt. Decreasing depression and anxiety by improvement of positive coping patterns was discussed. Pt acknowledged understanding of treatment goals and signed treatment plan signature sheet. In the last 6 months has the pt been a danger to self: YES  In the last 6 months has the pt been a danger to others: YES  Legal Guardian/POA: NO     Provided patient with [employer] handout entitled \"Quitting Smoking. \"  Reviewed handout with patient: addressing dangers of smoking, developing coping skills, and providing basic information about quitting. Patient received all components practical counseling of tobacco practical counseling during the hospital stay.

## 2021-11-03 NOTE — PLAN OF CARE
Problem: Anger Management/Homicidal Ideation:  Goal: Ability to verbalize frustrations and anger appropriately will improve  Description: Ability to verbalize frustrations and anger appropriately will improve  Outcome: Ongoing     Group Therapy Note     Date: 11/3/2021  Start Time: 1000  End Time:  7904  Number of Participants: 10     Type of Group: Psychotherapy     Patient's Goal: Patient will process emotions and actions and how to relate to other or their with others. Patient discussed open communication and feelings and emotions. Notes:  Patient attended process group as scheduled, patient identified a issue to work on today regarding how they will interact and relate to others. Status After Intervention:  Improved     Participation Level:  Active Listener     Participation Quality: Appropriate, Attentive, and Sharing        Speech:  normal        Thought Process/Content: Logical        Affective Functioning: Congruent        Mood: euthymic        Level of consciousness:  Alert        Response to Learning: Able to verbalize current knowledge/experience        Endings: None Reported     Modes of Intervention: Education, Support, and Socialization        Discipline Responsible: /Counselor        Signature:  [facility]

## 2021-11-03 NOTE — PROGRESS NOTES
Department of Psychiatry  Attending Progress Note      SUBJECTIVE:    41 y.o. female with previous psychiatric history of schizophrenia, who has been admitted to our psychiatric unit secondary to worsening of auditory hallucinations. Patient has been seen in treatment team room. She reported that her condition mildly improved since time of admission to the hospital and her mood is \"better\" today. She endorses good appetite and good quality of sleep during the last night with prescribed psychotropic medications. Patient is compliant with currently prescribed medications and denies any side effects. She continues to report feeling of depression and anxiety, and rated both of them as 7 out of 10, with 10 being the worst.  Patient continues to endorse auditory hallucinations, stated that she did hear \"sounds and poorly understandable voices\" last night and today morning. She denies any hallucinations during the interview and it did not seem that patient was responding for any internal stimuli. Patient denies any command hallucinations. Shee attended a few group activities in the unit but did not participate in those activities. Patient is not social with other patients in the unit and social with medical staff only when it is related to her treatment plan. She performed her ADLs today. Patient denies current active suicidal or homicidal ideations, denies any plans. She denies auditory and visual hallucinations during the interview. Patient did not endorse any paranoid ideations. OBJECTIVE    Physical  VITALS:  /85   Pulse 88   Temp 97 °F (36.1 °C) (Temporal)   Resp 14   Ht 5' 2\" (1.575 m)   Wt 187 lb 6.4 oz (85 kg)   SpO2 97%   BMI 34.28 kg/m²   TEMPERATURE:  Current - Temp: 97 °F (36.1 °C);  Max - Temp  Av.4 °F (36.3 °C)  Min: 96.5 °F (35.8 °C)  Max: 98.6 °F (37 °C)  RESPIRATIONS RANGE: Resp  Avg: 15.3  Min: 14  Max: 16  PULSE RANGE: Pulse  Av.7  Min: 87  Max: 100  BLOOD PRESSURE RANGE:  Systolic (07NDX), DVD:464 , Min:110 , RHX:595   ; Diastolic (38YVP), TBB:23, Min:80, Max:89    PULSE OXIMETRY RANGE: SpO2  Av.7 %  Min: 96 %  Max: 100 %    Review of Systems: 14 point review of systems is negative    Psychological ROS: Positive for presence of depression, anxiety, auditory hallucinations    Mental Status Examination:  Appearance: Appropriately groomed, wearing casual civilian clothes. Made intermittent eye   contact. Behavior: Slightly withdrawn, calm, cooperative with interview, socially appropriate. Mild psychomotor retardation appreciated. Gait unremarkable. Speech: Slightly decreased in volume, normal in tone and quality. Mood: \"Better\"   Affect: Mood congruent. Range is flat and restricted. Thought Process: Mostly circumstantial, sometimes linear and goal oriented  Thought Content: Patient does not have any current active suicidal and homicidal ideations. No overt delusions or paranoia appreciated. Perceptions: Seems patient does not have any auditory or visual hallucinations at present time. Patient did not appear to be responding to internal stimuli. Orientation: to person, place, and situation. Alert. Impulsivity: Questionable.    Neurovegitative: Fair appetite, fair sleep  Insight: Limited  Judgment: Limited    Data  Lab Results   Component Value Date    TSHFT4 1.69 2021    TSH 1.720 2021     Lab Results   Component Value Date    LLHBSRJV12 363 2021     Lab Results   Component Value Date    VITD25 30.2 2021       Medications  Current Facility-Administered Medications: divalproex (DEPAKOTE ER) extended release tablet 1,000 mg, 1,000 mg, Oral, Daily  paliperidone (INVEGA) extended release tablet 6 mg, 6 mg, Oral, Daily  haloperidol lactate (HALDOL) injection 5 mg, 5 mg, IntraMUSCular, Q6H PRN  LORazepam (ATIVAN) injection 2 mg, 2 mg, IntraMUSCular, Q6H PRN  nicotine (NICODERM CQ) 21 MG/24HR 1 patch, 1 patch, TransDERmal, Daily  acetaminophen (TYLENOL) tablet 650 mg, 650 mg, Oral, Q4H PRN  polyethylene glycol (GLYCOLAX) packet 17 g, 17 g, Oral, Daily PRN  traZODone (DESYREL) tablet 50 mg, 50 mg, Oral, Nightly PRN    ASSESSMENT AND PLAN    DSM 5 DIAGNOSIS:  Chronic schizophrenia  Tobacco use disorder, severe  Family relationship issues  Vitamin B12 deficiency    Plan:   1. Psychiatric Medications:   Continue current psychotropic medications as recommended. Patient denies side effect of currently prescribed medications. Will increase dose of Invega from 6 mg once a day to 9 mg once a day fo hallucinations  The risks, benefits, side effects, indications, contraindications, alternatives and adverse effects of the medications have been discussed with patient. 2. Medical Issues:    Continue medical monitoring by Dr. Portillo Prince and associates. Will start patient on vitamin B12 500 MCG daily for vitamin B12 deficiency. 3. Disposition:    Discharge patient home when she will be in stable mental condition and adjustment psychotropic medications will be done.      Amount of time spent with patient:    35 minutes with greater than 50% of the time spent in counseling and collaboration of care

## 2021-11-04 PROCEDURE — 1240000000 HC EMOTIONAL WELLNESS R&B

## 2021-11-04 PROCEDURE — 99233 SBSQ HOSP IP/OBS HIGH 50: CPT | Performed by: PSYCHIATRY & NEUROLOGY

## 2021-11-04 PROCEDURE — 6370000000 HC RX 637 (ALT 250 FOR IP): Performed by: PSYCHIATRY & NEUROLOGY

## 2021-11-04 PROCEDURE — 6370000000 HC RX 637 (ALT 250 FOR IP): Performed by: FAMILY MEDICINE

## 2021-11-04 PROCEDURE — 6370000000 HC RX 637 (ALT 250 FOR IP): Performed by: EMERGENCY MEDICINE

## 2021-11-04 RX ORDER — CLONIDINE HYDROCHLORIDE 0.1 MG/1
0.1 TABLET ORAL EVERY 4 HOURS PRN
Status: DISCONTINUED | OUTPATIENT
Start: 2021-11-04 | End: 2021-11-09 | Stop reason: HOSPADM

## 2021-11-04 RX ORDER — PALIPERIDONE 6 MG/1
12 TABLET, EXTENDED RELEASE ORAL DAILY
Status: DISCONTINUED | OUTPATIENT
Start: 2021-11-05 | End: 2021-11-09 | Stop reason: HOSPADM

## 2021-11-04 RX ADMIN — CYANOCOBALAMIN TAB 500 MCG 500 MCG: 500 TAB at 08:02

## 2021-11-04 RX ADMIN — DIVALPROEX SODIUM 1000 MG: 500 TABLET, EXTENDED RELEASE ORAL at 08:02

## 2021-11-04 RX ADMIN — PALIPERIDONE 9 MG: 6 TABLET, EXTENDED RELEASE ORAL at 08:02

## 2021-11-04 NOTE — PROGRESS NOTES
Department of Psychiatry  Attending Progress Note      SUBJECTIVE:    41 y. o. female with previous psychiatric history of schizophrenia, who has been admitted to our psychiatric unit secondary to worsening of auditory hallucinations. Patient has been seen in treatment team room. She reported no significant changes in her condition since time of admission, stated that her mood is \"okay\" today. She endorses good appetite and good quality of sleep during the last night. Patient is compliant with currently prescribed medications and denies any side effect. She continues to report feeling of depression and anxiety, and rated both of them as \"a little bit\". Patient continued to report auditory hallucinations, stated that she experienced hallucinations yesterday during the night and today during the morning, as well as during the interview. She described her auditory hallucinations, as \"noises, sounds, voices\". Patient stated that she cannot understand the content of the voices and denies any command hallucinations. She attends group activities in the unit and participates in some of them. Patient is not social with other patients in the unit and social with medical staff only when it is related to her treatment plan. Most of the time, she spent isolated in the day room between group activities. Patient performed her ADLs today. She denies current active suicidal or homicidal ideations, denies any plans. Also, she denies any visual hallucinations. Patient endorses auditory hallucinations, as described above. OBJECTIVE    Physical  VITALS:  BP (!) 126/90   Pulse 93   Temp 96.3 °F (35.7 °C)   Resp 16   Ht 5' 2\" (1.575 m)   Wt 187 lb 6.4 oz (85 kg)   SpO2 92%   BMI 34.28 kg/m²   TEMPERATURE:  Current - Temp: 96.3 °F (35.7 °C);  Max - Temp  Av.3 °F (35.7 °C)  Min: 96.3 °F (35.7 °C)  Max: 96.3 °F (35.7 °C)  RESPIRATIONS RANGE: Resp  Av  Min: 16  Max: 16  PULSE RANGE: Pulse  Av.5  Min: 80  Max: 106  BLOOD PRESSURE RANGE:  Systolic (05KYW), OYA:950 , Min:126 , ANI:625   ; Diastolic (56GQT), LCO:97, Min:90, Max:96    PULSE OXIMETRY RANGE: SpO2  Av.5 %  Min: 91 %  Max: 92 %    Review of Systems: 14 point review of systems is negative    Psychological ROS: Positive for presence of depression, anxiety, auditory hallucinations    Mental Status Examination:   Appearance: Appropriately groomed, wearing casual civilian clothes. Made intermittent eye contact. Behavior: Mildly withdrawn, cooperative with interview, socially appropriate. Mild psychomotor retardation appreciated. Gait unremarkable. Speech: Normal in tone and quality, decrease in volume. No pressured speech noted. Mood: \"okay\"   Affect: Mood congruent. Range is slightly restricted. Thought Process: Mostly linear and goal oriented, sometimes circumstantial  Thought Content: Patient does not have any current active suicidal and homicidal ideations. No overt delusions or paranoia appreciated. Perceptions: Seems that patient has been experiencing hallucinations during the interview, as it appeared she was responding to internal stimuli. Orientation: to person, place and situation. Alert. Impulsivity: Questionable. Neurovegitative: Good appetite, good sleep.    Insight: Impaired  Judgment: Limited    Data  No new lab test results to review    Medications  Current Facility-Administered Medications: vitamin B-12 (CYANOCOBALAMIN) tablet 500 mcg, 500 mcg, Oral, Daily  paliperidone (INVEGA) extended release tablet 9 mg, 9 mg, Oral, Daily  divalproex (DEPAKOTE ER) extended release tablet 1,000 mg, 1,000 mg, Oral, Daily  haloperidol lactate (HALDOL) injection 5 mg, 5 mg, IntraMUSCular, Q6H PRN  LORazepam (ATIVAN) injection 2 mg, 2 mg, IntraMUSCular, Q6H PRN  nicotine (NICODERM CQ) 21 MG/24HR 1 patch, 1 patch, TransDERmal, Daily  acetaminophen (TYLENOL) tablet 650 mg, 650 mg, Oral, Q4H PRN  polyethylene glycol (GLYCOLAX) packet 17 g, 17 g, Oral, Daily PRN  traZODone (DESYREL) tablet 50 mg, 50 mg, Oral, Nightly PRN    ASSESSMENT AND PLAN    DSM 5 DIAGNOSIS:  Chronic schizophrenia  Tobacco use disorder, severe  Family relationship issues  Vitamin B12 deficiency     Plan:   1. Psychiatric Medications:   Continue current psychotropic medications as recommended.  Patient denies side effect of currently prescribed medications. We will increase dose of Invega from 9 mg once a day to 12 mg once a day for hallucinations    The risks, benefits, side effects, indications, contraindications, alternatives and adverse effects of the medications have been discussed with patient.     2. Medical Issues:    Continue medical monitoring by Dr. Alyson Otero and associates.       3.  Disposition:    Discharge patient home when she will be in stable mental condition and adjustment psychotropic medications will be done.     Amount of time spent with patient:    35 minutes with greater than 50% of the time spent in counseling and collaboration of care

## 2021-11-04 NOTE — PROGRESS NOTES
Treatment Team Note:     SANTOS met with [address] team to discuss Pts TX and DC plans.      Progress/Behavior/Group Attendance: TBD     Target Symptoms/Reason for admission:  Patient admitted to [facility] due to female with a history of schizophrenia presents with increasing hallucinations. Amrik Dumont is quiet when I ask if any of them are threatening to her or if she had desire to hurt herself or anyone else. Amrik Dumont is cooperative.  Does not seem anxious.  Denies any injury or abuse.   Diagnoses: Chronic schizophrenia, Tobacco use disorder, severe, Family relationship issues  UDS: Neg  BAL:  Neg        AftercarePlan: [facility]     Pt lives with: fom     Collateral obtained from: mom  On:11/2/21     Family Session: YULY     Misc:

## 2021-11-04 NOTE — PROGRESS NOTES
Group Therapy Note    Start Time: 800  End Time:  900  Number of Participants: 15    Type of Group: Community Meeting       Patient's Goal:  \"Try to go home\"      Notes:        Participation Level:  Active Listener       Participation Quality: Appropriate      Thought Process/Content: Logical      Affective Functioning: Congruent      Mood: Calm      Level of consciousness:  Alert      Modes of Intervention: Support      Discipline Responsible: Behavioral Health Tech II      Signature:  Adelina Naranjo

## 2021-11-04 NOTE — PROGRESS NOTES
BHI Daily Shift Assessment  Nursing Progress Note    Room: 0606/606-01 Name: Raulito Melendez Age: 41 y.o. Gender: female   Dx: <principal problem not specified>  Precautions: suicide risk  Target Symptoms:   Accu-Chek: NoSleep: Yes,Sleep Quality Good SI No AVH auditory [facility]  ADLs: Yes Speech: normal Depression: 7 Anxiety: 5   Participation LevelMinimal  Appetite: Good  Respiratory symptoms: No Headache: No Body aches: No Fever: No Cough: No  Patients encouraged to wear masks, wash hands frequently and practice social distancing while on the unit: Yes  Visitation: No  Participation QualityResistant    Complaints:none    Notes: alert to person. Appropriately dressed, well groomed. mildy withdrawn and evasive. Isolative to self. Patient is in day area walking around and responding to internal stimuli. Friendly with staff and peers, compliant with medications. Thought processes are linear and goal directed. Affect is congruent and mildly restricted. Patient has been experiencing auditory hallucinations all morning. Patient states \"they are constant. \"  Signature: Electronically signed by Velasquez Macias RN on 11/4/21 at 11:15 AM CDT

## 2021-11-04 NOTE — PROGRESS NOTES
Progress Note  Radha Medeiros  11/3/2021 11:58 PM  Subjective:   Admit Date:   11/1/2021      CC/ADMIT DX:       Interval History:   Reviewed overnight events and nursing notes. No new physical complaints. I have reviewed all labs/diagnostics from the last 24hrs. ROS:   I have done a 10 point ROS and all are negative, except what is mentioned in the HPI. ADULT DIET; Regular; 5 carb choices (75 gm/meal)    Medications:      vitamin B-12  500 mcg Oral Daily    [START ON 11/4/2021] paliperidone  9 mg Oral Daily    divalproex  1,000 mg Oral Daily    nicotine  1 patch TransDERmal Daily           Objective:   Vitals: BP (!) 154/96   Pulse 106   Temp 96.3 °F (35.7 °C)   Resp 16   Ht 5' 2\" (1.575 m)   Wt 187 lb 6.4 oz (85 kg)   SpO2 91%   BMI 34.28 kg/m²  No intake or output data in the 24 hours ending 11/03/21 0428  General appearance: alert and cooperative with exam  Extremities: extremities normal, atraumatic, no cyanosis or edema  Neurologic:  No obvious focal neurologic deficits. Skin: no rashes    Assessment and Plan: Active Problems:    Hallucinations    Schizophrenia ([address]  Resolved Problems:    * No resolved hospital problems. *    Elevated BP    Plan:  1. Continue present medication(s)   2. Follow with BP  3. Follow with Psych      Discharge planning:   her home     Reviewed treatment plans with the patient and/or family.              Electronically signed by Hayley Davis MD on 11/3/2021 at 11:58 PM

## 2021-11-04 NOTE — PROGRESS NOTES
[facility] Adult Unit Daily Assessment  Nursing Progress Note    Room: 06/606-01   Name: Kostas Dejesus   Age: 41 y.o. Gender: female   Dx: <principal problem not specified>  Precautions: suicide risk  Inpatient Status: voluntary       SLEEP:    Sleep Quality Good  Sleep Medications: Yes, Trazodone 50mg   PRN Sleep Meds: No       MEDICAL:    Other PRN Meds: No   Med Compliant: Yes  Accu-Chek: No  Oxygen/CPAP/BiPAP: No  CIWA/CINA: No   PAIN Assessment: none  Side Effects from medication: No    Is Patient experiencing any respiratory symptoms (headache, fever, body aches, cough. Malik Hodge ): no  Patient educated by nursing to practice social distancing, wear masks, wash hands frequently: yes      PSYCH:    Depression: \"little bit\"   Anxiety: \"little bit\"   SI denies suicidal ideation   HI Negative for homicidal ideation      AVH:Present - admits to hearing voices, would not report what voices are saying      GENERAL:    Appetite: good    Social: No   Speech: normal, mumbles at times   Appearance: appropriately dressed    GROUP:    Group Participation: No  Participation Quality: None    Notes: Pt A&O to person, place & time of day. Pt with poor concentration, poor eye contact & flat facial expression. Pt appropriately dressed & groomed. Pt walks away from this nurse while attempting conversation. Pt occasionally in the day area this evening, but not social with peers. Did hear patient talking loudly once during the night. Pt was awake at that time, lying in bed, stated 'I was talking to myself.' Reassured pt of her safety. Pt denies SI/HI. Pt asleep by midnight. Will continue to monitor via 15 minute rounds.         Electronically signed by Shaka Lorenzo RN on 11/4/2021 at 1:09 AM

## 2021-11-05 PROCEDURE — 1240000000 HC EMOTIONAL WELLNESS R&B

## 2021-11-05 PROCEDURE — 6370000000 HC RX 637 (ALT 250 FOR IP): Performed by: PSYCHIATRY & NEUROLOGY

## 2021-11-05 PROCEDURE — 99233 SBSQ HOSP IP/OBS HIGH 50: CPT | Performed by: PSYCHIATRY & NEUROLOGY

## 2021-11-05 PROCEDURE — 6360000002 HC RX W HCPCS: Performed by: PSYCHIATRY & NEUROLOGY

## 2021-11-05 PROCEDURE — 6370000000 HC RX 637 (ALT 250 FOR IP): Performed by: FAMILY MEDICINE

## 2021-11-05 PROCEDURE — 6370000000 HC RX 637 (ALT 250 FOR IP): Performed by: EMERGENCY MEDICINE

## 2021-11-05 RX ORDER — LOPERAMIDE HYDROCHLORIDE 2 MG/1
2 CAPSULE ORAL 4 TIMES DAILY PRN
Status: DISCONTINUED | OUTPATIENT
Start: 2021-11-05 | End: 2021-11-09 | Stop reason: HOSPADM

## 2021-11-05 RX ADMIN — ACETAMINOPHEN 650 MG: 325 TABLET ORAL at 20:43

## 2021-11-05 RX ADMIN — PALIPERIDONE 12 MG: 6 TABLET, EXTENDED RELEASE ORAL at 07:59

## 2021-11-05 RX ADMIN — CYANOCOBALAMIN TAB 500 MCG 500 MCG: 500 TAB at 07:59

## 2021-11-05 RX ADMIN — DIVALPROEX SODIUM 1000 MG: 500 TABLET, EXTENDED RELEASE ORAL at 07:58

## 2021-11-05 ASSESSMENT — PAIN SCALES - GENERAL
PAINLEVEL_OUTOF10: 3
PAINLEVEL_OUTOF10: 2
PAINLEVEL_OUTOF10: 7

## 2021-11-05 NOTE — PROGRESS NOTES
Group Therapy Note    Date:11/4  Time:20;00    Patient attended and participated in [facility]. Patient filled out wrap up group documentation.     Notes:    Theresa CHANG

## 2021-11-05 NOTE — PROGRESS NOTES
BHI Daily Shift Assessment  Nursing Progress Note    Room: [facility][note 3]/606-01 Name: Chel Hernández Age: 41 y.o. Ethnicity: -American Gender: female   Dx: <principal problem not specified>  Precautions: suicide risk  CPAP: No Accu-Chek: No  MSE:  Status and Exam  Normal: No  Facial Expression: Flat  Affect: Inappropriate  Level of Consciousness: Alert  Mood:Normal: No  Mood: Depressed, Anxious  Motor Activity:Normal: Yes  Motor Activity: Decreased  Interview Behavior: Cooperative  Preception: [place] to Situation, [place] to Place  Attention:Normal: No  Attention: Distractible  Thought Processes: Blocking  Thought Content:Normal: No  Thought Content: Delusions  Hallucinations: Auditory (Comment), Visual (Comment)  Delusions: No  Memory:Normal: Yes  Memory: Poor Recent, Poor Remote  Insight and Judgment: No  Insight and Judgment: Poor Judgment, Poor Insight  Present Suicidal Ideation: No  Present Homicidal Ideation: No  Sleep: Yes, Good, no sleep issues Hours Slept: 8 Sched Sleep Meds: Yes PRN Sleep Meds: Yes Other PRN Meds: No Med Compliant: Yes Appetite: good Percent Meals: 100% Social: No ADLs: No Speech: hesitant Depression: 6 Anxiety: 6    Pt pacing in hallways and dinning area seems to be responding to internal stimuli, talking to self and moving arms around. She states \"I need a mask I have covid. She does attend groups and walks off before groups are finnished. She is medication compliant.  She is occasionally social with peers     Destini Reddy RN

## 2021-11-05 NOTE — PROGRESS NOTES
[facility] Adult Unit Daily Assessment  Nursing Progress Note    Room: [facility]/606-01   Name: Kavitha Orlando   Age: 41 y.o. Gender: female   Dx: <principal problem not specified>  Precautions: suicide risk  Inpatient Status: voluntary       SLEEP:    Sleep Quality Good  Sleep Medications: Yes   PRN Sleep Meds: No       MEDICAL:    Other PRN Meds: No   Med Compliant: Yes  Accu-Chek: No  Oxygen/CPAP/BiPAP: No  CIWA/CINA: No   PAIN Assessment: none  Side Effects from medication: No    Is Patient experiencing any respiratory symptoms (headache, fever, body aches, cough. Algis Broaden ): no  Patient educated by nursing to practice social distancing, wear masks, wash hands frequently: yes      PSYCH:    Depression: 7   Anxiety: 7   SI denies suicidal ideation   HI Negative for homicidal ideation      AVH:Present - Command Hallucinations       GENERAL:    Appetite: good    Social: No   Speech: slurred   Appearance: appropriately dressed and healthy looking    GROUP:    Group Participation: Yes  Participation Quality: Minimal    Notes: Pt was seen in dayroom. Pt is noticeably having hallucinations. Pt says she hears things but when asked to explain she says , they say lots of things. Pt says she also sees shadows. Pt has been pacing around hallways , talking to herself. Pt is not social with staff and peers. Pt did attend group. Pt also says she is depressed and anxious. Pt is denying SI,HI.

## 2021-11-05 NOTE — GROUP NOTE
Group Therapy Note    Date: 11/5/2021    Group Start Time: 1600  Group End Time: 1700  Group Topic: Healthy Living/Wellness    [facility] ADULT I    Kassy Hernandez RN                Patient's Goal:  Medication education    Status After Intervention:  Improved    Participation Level: Active Listener    Participation Quality: Appropriate and Attentive      Speech:  normal      Thought Process/Content: Logical      Affective Functioning: Congruent      Level of consciousness:  Alert and Oriented x4      Response to Learning: Able to verbalize current knowledge/experience       Modes of Intervention: Education      Discipline Responsible: Registered Nurse      Signature:   Kassy Hernandez RN

## 2021-11-05 NOTE — PROGRESS NOTES
Progress Note  Radha Medeiros  11/4/2021 10:18 PM  Subjective:   Admit Date:   11/1/2021      CC/ADMIT DX:       Interval History:   Reviewed overnight events and nursing notes. She has no medical complaints. I have reviewed all labs/diagnostics from the last 24hrs. ROS:   I have done a 10 point ROS and all are negative, except what is mentioned in the HPI. ADULT DIET; Regular; 5 carb choices (75 gm/meal)    Medications:      [START ON 11/5/2021] paliperidone  12 mg Oral Daily    vitamin B-12  500 mcg Oral Daily    divalproex  1,000 mg Oral Daily    nicotine  1 patch TransDERmal Daily           Objective:   Vitals: BP (!) 144/89   Pulse 94   Temp 97.9 °F (36.6 °C)   Resp 17   Ht 5' 2\" (1.575 m)   Wt 187 lb 6.4 oz (85 kg)   SpO2 93%   BMI 34.28 kg/m²  No intake or output data in the 24 hours ending 11/04/21 2218  General appearance: alert and cooperative with exam  Extremities: extremities normal, atraumatic, no cyanosis or edema  Neurologic:  No obvious focal neurologic deficits. Skin: no rashes    Assessment and Plan: Active Problems:    Hallucinations    Schizophrenia ([address]  Resolved Problems:    * No resolved hospital problems. *    Elevated BP    Plan:  1. Continue present medication(s)   2. Monitor BP  3. Follow with Psych      Discharge planning:   her home     Reviewed treatment plans with the patient and/or family.              Electronically signed by Rosalie Gardner MD on 11/4/2021 at 10:18 PM

## 2021-11-05 NOTE — PLAN OF CARE
Problem: Anger Management/Homicidal Ideation:  Goal: Able to display appropriate communication and problem solving  Outcome: Ongoing                                                                       Group Therapy Note    Date: 11/5/2021  Start Time: 1100  End Time:  1130  Number of Participants: 6    Type of Group: Psychoeducation    Wellness Binder Information  Module Name:  Staying well  Session Number:  1    Patient's Goal:  daily maintenance coping skills    Notes:  pt acknowledged use of positive coping skills daily to help stay well.     Status After Intervention:  Improved    Participation Level: Interactive    Participation Quality: Appropriate, Attentive, and Sharing      Speech:  normal      Thought Process/Content: Logical      Affective Functioning: Congruent      Mood: congruent      Level of consciousness:  Alert, Oriented x4, and Attentive      Response to Learning: Able to verbalize current knowledge/experience      Endings: None Reported    Modes of Intervention: Education      Discipline Responsible: Psychoeducational Specialist      Signature:  Petey Childress

## 2021-11-05 NOTE — PROGRESS NOTES
94  BLOOD PRESSURE RANGE:  Systolic (43GDK), QHJ:151 , Min:144 , NVW:003   ; Diastolic (39QEY), KYC:06, Min:89, Max:101    PULSE OXIMETRY RANGE: SpO2  Av %  Min: 93 %  Max: 93 %    Review of Systems: 14 point review of systems is negative    Psychological ROS: Positive for presence of depression, anxiety, hallucinations    Mental Status Examination:   Appearance: Appropriately groomed, wearing hospital attire. Made good eye contact. Behavior: Calm, cooperative, friendly, and socially appropriate. Mild psychomotor retardation appreciated. Gait unremarkable. Speech: Normal in tone and quality, decrease in volume. Mood: \" okay\"   Affect: Mood congruent. Range is slightly restricted  Thought Process: Mostly circumstantial  Thought Content: Patient does not have any current active suicidal and homicidal ideations. No overt delusions or paranoia appreciated. Perceptions: Seems patient does not have any auditory or visual hallucinations at present time. Patient did not appear to be responding to internal stimuli. Orientation: to person, place and situation. Alert. Impulsivity: Improved. Neurovegitative: Good appetite, good sleep  Insight: Limited  Judgment: Limited    Data  No new lab test results to review.     Medications  Current Facility-Administered Medications: paliperidone (INVEGA) extended release tablet 12 mg, 12 mg, Oral, Daily  cloNIDine (CATAPRES) tablet 0.1 mg, 0.1 mg, Oral, Q4H PRN  vitamin B-12 (CYANOCOBALAMIN) tablet 500 mcg, 500 mcg, Oral, Daily  divalproex (DEPAKOTE ER) extended release tablet 1,000 mg, 1,000 mg, Oral, Daily  haloperidol lactate (HALDOL) injection 5 mg, 5 mg, IntraMUSCular, Q6H PRN  LORazepam (ATIVAN) injection 2 mg, 2 mg, IntraMUSCular, Q6H PRN  nicotine (NICODERM CQ) 21 MG/24HR 1 patch, 1 patch, TransDERmal, Daily  acetaminophen (TYLENOL) tablet 650 mg, 650 mg, Oral, Q4H PRN  polyethylene glycol (GLYCOLAX) packet 17 g, 17 g, Oral, Daily PRN  traZODone (DESYREL) tablet 50 mg, 50 mg, Oral, Nightly PRN    ASSESSMENT AND PLAN  DSM 5 DIAGNOSIS:  Chronic schizophrenia  Tobacco use disorder, severe  Family relationship issues  Vitamin B12 deficiency     Plan:   1. Psychiatric Medications:   Continue current psychotropic medications as recommended.    Patient denies side effect of currently prescribed medications. No changes with dose of prescribed psychotropic medications today.     2. Medical Issues:    Continue medical monitoring by Dr. Meron Mccord and associates.       3.  Disposition:    Discharge patient home when she will be in stable mental condition and adjustment psychotropic medications will be done.     Amount of time spent with patient:    35 minutes with greater than 50% of the time spent in counseling and collaboration of care

## 2021-11-05 NOTE — PROGRESS NOTES
Treatment Team Note:     SW met with [place] team to discuss Pts TX and DC plans.      Progress/Behavior/Group Attendance: TBD     Target Symptoms/Reason for admission:  Patient admitted to [facility] due to female with a history of schizophrenia presents with increasing hallucinations. Marylen Harden is quiet when I ask if any of them are threatening to her or if she had desire to hurt herself or anyone else. Marylen Harden is cooperative.  Does not seem anxious.  Denies any injury or abuse.   Diagnoses: Chronic schizophrenia, Tobacco use disorder, severe, Family relationship issues  UDS: Neg  BAL:  Neg        AftercarePlan: [facility]     Pt lives with: fom     Collateral obtained from: mom  On:11/2/21     Family Session: YULY     Misc:

## 2021-11-06 PROCEDURE — 1240000000 HC EMOTIONAL WELLNESS R&B

## 2021-11-06 PROCEDURE — 6370000000 HC RX 637 (ALT 250 FOR IP): Performed by: PSYCHIATRY & NEUROLOGY

## 2021-11-06 PROCEDURE — 99233 SBSQ HOSP IP/OBS HIGH 50: CPT | Performed by: PSYCHIATRY & NEUROLOGY

## 2021-11-06 PROCEDURE — 6370000000 HC RX 637 (ALT 250 FOR IP): Performed by: FAMILY MEDICINE

## 2021-11-06 PROCEDURE — 6370000000 HC RX 637 (ALT 250 FOR IP): Performed by: EMERGENCY MEDICINE

## 2021-11-06 RX ORDER — TRAZODONE HYDROCHLORIDE 100 MG/1
100 TABLET ORAL NIGHTLY
Status: DISCONTINUED | OUTPATIENT
Start: 2021-11-06 | End: 2021-11-08

## 2021-11-06 RX ORDER — QUETIAPINE FUMARATE 100 MG/1
100 TABLET, FILM COATED ORAL NIGHTLY
Status: DISCONTINUED | OUTPATIENT
Start: 2021-11-06 | End: 2021-11-07

## 2021-11-06 RX ADMIN — QUETIAPINE FUMARATE 100 MG: 100 TABLET ORAL at 21:08

## 2021-11-06 RX ADMIN — TRAZODONE HYDROCHLORIDE 100 MG: 100 TABLET ORAL at 21:08

## 2021-11-06 RX ADMIN — PALIPERIDONE 12 MG: 6 TABLET, EXTENDED RELEASE ORAL at 08:01

## 2021-11-06 RX ADMIN — CYANOCOBALAMIN TAB 500 MCG 500 MCG: 500 TAB at 08:01

## 2021-11-06 RX ADMIN — DIVALPROEX SODIUM 1000 MG: 500 TABLET, EXTENDED RELEASE ORAL at 08:01

## 2021-11-06 NOTE — PROGRESS NOTES
Patient has asked multiple times, \"when am I leaving, where am I going? \"  Explained that she would be seen by physician again in AM but, tonight she was staying in her assigned room on unit. Reassured patient of safety on unit. Approximately 30 minutes later patient stated, \" Can you call the doctor? I want to go home, don't feel safe here. \" Reassured and reiterated with patient that physician would see her in AM and that she was safe here. Reminded patient that we make every 15 minutes safety checks. Patient just stared at this author, briefly. Then asked \"can you dial this number for me? \"  Assisted patient with phone call and she spoke on phone for a few minutes.

## 2021-11-06 NOTE — PROGRESS NOTES
Group Note  Group Time 20:00 to 20:30  Number of Participants in Group: 5  Number of Patients on Unit:6      Patient attended group:Yes  Reason for Absence:  Intervention for patient absence:        Type of Group:   Wrap-Up/Relaxation    Patient's Goal: See wrap up group sheet    Participation Level: Active Listener, Interactive, Monopolizing, Minimal and None           Patient Response to Learning: Positive    Patient's Behavior: Withdrawn    Is Patient Social/Interacting: No    Relaxation:   Television:No   Reading:No   Game/Puzzle:No   Phone:  Yes             Please see patient's wrap up group sheet for patient's comments

## 2021-11-06 NOTE — PROGRESS NOTES
[facility] Adult Unit Daily Assessment  Nursing Progress Note    Room: 0606/606-01   Name: Cleo Henry   Age: 41 y.o. Gender: female   Dx: <principal problem not specified>  Precautions: suicide risk  Inpatient Status: voluntary       SLEEP:    Sleep Quality Good  Sleep Medications: No   PRN Sleep Meds: Yes       MEDICAL:    Other PRN Meds: Yes   Med Compliant: Yes  Accu-Chek: No  Oxygen/CPAP/BiPAP: No  CIWA/CINA: No   PAIN Assessment: none  Side Effects from medication: No    Is Patient experiencing any respiratory symptoms (headache, fever, body aches, cough. Estil Prieto ): no  Patient educated by nursing to practice social distancing, wear masks, wash hands frequently: yes      PSYCH:    Depression: 7   Anxiety: 8   SI denies suicidal ideation   HI Negative for homicidal ideation      AVH:Present - Reports constant auditory hallucinations present, occasionally they give her commands but is unable to verbalize what those commands are at present, reports they are not friendly, and occasionally sees shadowy figures. GENERAL:    Appetite: improved    Social: No   Speech: hesitant   Appearance: appropriately dressed, disheveled and healthy looking    GROUP:    Group Participation: Yes  Participation Quality: Minimal; she will walk in/out of groups    Notes:     Aroused this am with slight difficulty. Previous shift reports she did not fall asleep until around 0300. Did get up and eat breakfast, compliant with medications, sat down with this nurse and discussed hallucinations. Reports they started after her brother  when she was around 26 and have not stopped since. Reports that they are not friendly and do tell her what to do at times but is unable to verbalize as to what that is at this time. Reports seeing shadowy figures at times but denies they try to harm her.  Denies suicidal or homicidal ideations at this time and reports she has not felt this way in the recent past. Took a shower this am and was heard to be having a

## 2021-11-06 NOTE — PROGRESS NOTES
Department of Psychiatry  Attending Progress Note      SUBJECTIVE:    41 y. o. female with previous psychiatric history of schizophrenia, who has been admitted to our psychiatric unit secondary to worsening of auditory hallucinations. Patient has been seen in treatment team room. She reported that her condition mildly improved during this hospital stay and her mood is \"better\" today. She endorses good appetite and reported decreased quality of sleep during the last night, stated that she experienced voices again. Patient reported that she experiences voices 5-6 times a day, however, stated that the voices became less intense. She is compliant with currently prescribed medications and denies any side effects. Patient continues to report feeling of depression and anxiety, and rated both of them as 5-6 out of 10, with 10 being the worst.  Patient attends group activities in the unit and participates in some of those activities. She is not social with other patients in the unit and social with medical staff only when it is related to her treatment plan. Patient performs her ADLs daily. She denies current active suicidal or homicidal ideations, denies any plans. Also, she denies any auditory and visual hallucinations. OBJECTIVE    Physical  VITALS:  /83   Pulse 103   Temp 96.4 °F (35.8 °C) (Temporal)   Resp 18   Ht 5' 2\" (1.575 m)   Wt 187 lb 6.4 oz (85 kg)   SpO2 99%   BMI 34.28 kg/m²   TEMPERATURE:  Current - Temp: 96.4 °F (35.8 °C);  Max - Temp  Av.2 °F (36.2 °C)  Min: 96.4 °F (35.8 °C)  Max: 97.9 °F (36.6 °C)  RESPIRATIONS RANGE: Resp  Av  Min: 18  Max: 20  PULSE RANGE: Pulse  Av.5  Min: 96  Max: 103  BLOOD PRESSURE RANGE:  Systolic (79MEL), EXR:672 , Min:123 , WQH:171   ; Diastolic (57LWZ), JUB:15, Min:83, Max:98    PULSE OXIMETRY RANGE: SpO2  Av.5 %  Min: 99 %  Max: 100 %    Review of Systems: 14 point review of systems is negative    Psychological ROS: Positive for presence of depression, anxiety, auditory hallucinations    Mental Status Examination:   Appearance: Appropriately groomed, wearing casual civilian clothes. Made intermittent eye contact. Behavior: Slightly withdrawn, cooperative with interview, socially appropriate. Mild psychomotor retardation appreciated. Gait unremarkable. Speech: Normal in tone, volume and quality. Mood: \"Better\"   Affect: Mood congruent. Range is flat and restricted  Thought Process: Mostly circumstantial, sometimes linear and goal oriented. Thought Content: Patient does not have any current active suicidal and homicidal ideations. No overt delusions or paranoia appreciated. Perceptions: Seems patient does not have any auditory or visual hallucinations at present time. Patient did not appear to be responding to internal stimuli. Orientation: to person, place, date, and situation. Alert. Impulsivity: Fair. Neurovegitative: Good appetite, decreased sleep.    Insight: Limited  Judgment: Limited    Data  No new lab test results to review    Medications  Current Facility-Administered Medications: loperamide (IMODIUM) capsule 2 mg, 2 mg, Oral, 4x Daily PRN  paliperidone (INVEGA) extended release tablet 12 mg, 12 mg, Oral, Daily  cloNIDine (CATAPRES) tablet 0.1 mg, 0.1 mg, Oral, Q4H PRN  vitamin B-12 (CYANOCOBALAMIN) tablet 500 mcg, 500 mcg, Oral, Daily  divalproex (DEPAKOTE ER) extended release tablet 1,000 mg, 1,000 mg, Oral, Daily  haloperidol lactate (HALDOL) injection 5 mg, 5 mg, IntraMUSCular, Q6H PRN  LORazepam (ATIVAN) injection 2 mg, 2 mg, IntraMUSCular, Q6H PRN  nicotine (NICODERM CQ) 21 MG/24HR 1 patch, 1 patch, TransDERmal, Daily  acetaminophen (TYLENOL) tablet 650 mg, 650 mg, Oral, Q4H PRN  polyethylene glycol (GLYCOLAX) packet 17 g, 17 g, Oral, Daily PRN  traZODone (DESYREL) tablet 50 mg, 50 mg, Oral, Nightly PRN    ASSESSMENT AND PLAN    DSM 5 DIAGNOSIS:  Chronic schizophrenia  Tobacco use disorder, severe  Family relationship issues  Vitamin B12 deficiency     Plan:   1. Psychiatric Medications:   Continue current psychotropic medications as recommended.  Patient denies side effect of currently prescribed medications. Will increase dose of trazodone from 50 mg to 100 mg for insomnia. Will start patient on Seroquel 100 mg at bedtime for psychotic symptoms. The risks, benefits, side effects, indications, contraindications, alternatives and adverse effects of the medications have been discussed with patient.     2. Medical Issues:    Continue medical monitoring by Dr. Valerie Navarro and associates.       3.  Disposition:    Discharge patient home when she will be in stable mental condition and adjustment psychotropic medications will be done.     Amount of time spent with patient:    35 minutes with greater than 50% of the time spent in counseling and collaboration of care

## 2021-11-06 NOTE — PLAN OF CARE
Problem: Anger Management/Homicidal Ideation:  Goal: Able to display appropriate communication and problem solving  Description: Able to display appropriate communication and problem solving  Outcome: Ongoing  Goal: Ability to verbalize frustrations and anger appropriately will improve  Description: Ability to verbalize frustrations and anger appropriately will improve  Outcome: Ongoing  Goal: Absence of angry outbursts  Description: Absence of angry outbursts  Outcome: Ongoing  Goal: Absence of homicidal ideation  Description: Absence of homicidal ideation  Outcome: Ongoing  Goal: Participates in care planning  Description: Participates in care planning  Outcome: Ongoing     Problem: Risk of Harm:  Goal: Ability to remain free from injury will improve  Description: Ability to remain free from injury will improve  Outcome: Ongoing

## 2021-11-06 NOTE — PROGRESS NOTES
Progress Note  Radha Medeiros  11/5/2021 10:37 PM  Subjective:   Admit Date:   11/1/2021      CC/ADMIT DX:       Interval History:   Reviewed overnight events and nursing notes. She has no new medical issues. I have reviewed all labs/diagnostics from the last 24hrs. ROS:   I have done a 10 point ROS and all are negative, except what is mentioned in the HPI. ADULT DIET; Regular; 5 carb choices (75 gm/meal)    Medications:      paliperidone  12 mg Oral Daily    vitamin B-12  500 mcg Oral Daily    divalproex  1,000 mg Oral Daily    nicotine  1 patch TransDERmal Daily           Objective:   Vitals: BP (!) 151/98 Comment: Patient has been slowly pacing halls & asking about leaving  Pulse 96   Temp 97.9 °F (36.6 °C) (Temporal)   Resp 20   Ht 5' 2\" (1.575 m)   Wt 187 lb 6.4 oz (85 kg)   SpO2 100%   BMI 34.28 kg/m²  No intake or output data in the 24 hours ending 11/05/21 2234  General appearance: alert and cooperative with exam  Extremities: extremities normal, atraumatic, no cyanosis or edema  Neurologic:  No obvious focal neurologic deficits. Skin: no rashes    Assessment and Plan: Active Problems:    Hallucinations    Schizophrenia ([facility] [address]  Resolved Problems:    * No resolved hospital problems. *    Elevated BP    Plan:  1. Continue present medication(s)   2. Follow with BP  3. Follow with Psych      Discharge planning:   her home     Reviewed treatment plans with the patient and/or family.              Electronically signed by Ralf Madrid MD on 11/5/2021 at 10:37 PM

## 2021-11-06 NOTE — PROGRESS NOTES
Participation Quality      Notes: Pt is in the dining area during this interview. She has been to the desk several times asking for help making phone calls. She is cooperative but is laughing inappropriately and talking to herself . After this interview the pt became loud ,talking or responding to internal stimuli. Haldol and ativan Im given as per MAR. Will continue to monitor.

## 2021-11-06 NOTE — PROGRESS NOTES
Group Therapy Note    Start Time: 800  End Time:  778  Number of Participants: 4    Type of Group: Community Meeting       Patient's Goal:  No I didn't work on      Notes:      Participation Level:  Active Listener       Participation Quality: Appropriate      Thought Process/Content: Logical      Affective Functioning: Congruent      Mood: calm      Level of consciousness:  Alert      Modes of Intervention: Support      Discipline Responsible: Behavioral Health Tech II      Signature:  Matheus Pike

## 2021-11-07 PROCEDURE — 6370000000 HC RX 637 (ALT 250 FOR IP): Performed by: PSYCHIATRY & NEUROLOGY

## 2021-11-07 PROCEDURE — 6370000000 HC RX 637 (ALT 250 FOR IP): Performed by: EMERGENCY MEDICINE

## 2021-11-07 PROCEDURE — 1240000000 HC EMOTIONAL WELLNESS R&B

## 2021-11-07 PROCEDURE — 6370000000 HC RX 637 (ALT 250 FOR IP): Performed by: FAMILY MEDICINE

## 2021-11-07 PROCEDURE — 6370000000 HC RX 637 (ALT 250 FOR IP): Performed by: NURSE PRACTITIONER

## 2021-11-07 RX ORDER — LISINOPRIL 10 MG/1
10 TABLET ORAL DAILY
Status: DISCONTINUED | OUTPATIENT
Start: 2021-11-07 | End: 2021-11-09 | Stop reason: HOSPADM

## 2021-11-07 RX ORDER — HYDROXYZINE HYDROCHLORIDE 25 MG/1
25 TABLET, FILM COATED ORAL 3 TIMES DAILY PRN
Status: DISCONTINUED | OUTPATIENT
Start: 2021-11-07 | End: 2021-11-09 | Stop reason: HOSPADM

## 2021-11-07 RX ORDER — QUETIAPINE FUMARATE 200 MG/1
200 TABLET, FILM COATED ORAL NIGHTLY
Status: DISCONTINUED | OUTPATIENT
Start: 2021-11-07 | End: 2021-11-08

## 2021-11-07 RX ADMIN — QUETIAPINE FUMARATE 200 MG: 200 TABLET ORAL at 21:05

## 2021-11-07 RX ADMIN — ACETAMINOPHEN 650 MG: 325 TABLET ORAL at 12:01

## 2021-11-07 RX ADMIN — LISINOPRIL 10 MG: 10 TABLET ORAL at 08:38

## 2021-11-07 RX ADMIN — HYDROXYZINE HYDROCHLORIDE 25 MG: 25 TABLET, FILM COATED ORAL at 17:31

## 2021-11-07 RX ADMIN — PALIPERIDONE 12 MG: 6 TABLET, EXTENDED RELEASE ORAL at 08:21

## 2021-11-07 RX ADMIN — CYANOCOBALAMIN TAB 500 MCG 500 MCG: 500 TAB at 08:21

## 2021-11-07 RX ADMIN — DIVALPROEX SODIUM 1000 MG: 500 TABLET, EXTENDED RELEASE ORAL at 08:21

## 2021-11-07 RX ADMIN — TRAZODONE HYDROCHLORIDE 100 MG: 100 TABLET ORAL at 21:05

## 2021-11-07 RX ADMIN — HYDROXYZINE HYDROCHLORIDE 25 MG: 25 TABLET, FILM COATED ORAL at 23:16

## 2021-11-07 ASSESSMENT — PAIN SCALES - GENERAL
PAINLEVEL_OUTOF10: 0
PAINLEVEL_OUTOF10: 3
PAINLEVEL_OUTOF10: 1

## 2021-11-07 NOTE — PROGRESS NOTES
BHI Daily Shift Assessment  Nursing Progress Note    Room: 0606/606-01 Name: Kostas Dejesus Age: 41 y.o. Gender: female   Dx: <principal problem not specified>  Precautions: suicide risk  Target Symptoms:   Accu-Chek: NoSleep: Yes,Sleep Quality Fair SI No AVH Denies   [address] [place] [address]  ADLs: Claims to Speech: normal Depression: 0 Anxiety: 0   Participation LevelMinimal  Appetite: Good  Respiratory symptoms: No Headache: No Body aches: No Fever: No Cough: No  Patients encouraged to wear masks, wash hands frequently and practice social distancing while on the unit: Yes  Visitation: No   Participation QualityAppropriate and Attentive    Complaints:    Notes: patient walking around floor, wearing casual clothes and hair disheveled. Patient has flat affect but cooperative. When paitent asked about her depression level, patient responds by saying \"I'm wondering when I'm going home, and why I was brought here in the first place. \" Talked with patient about the hallucinations she reported having in the past and asked her about voices that she heard last night. Patient could not remember anything about hallucinations last night and denied that they happened today. Patient denies SI, HI and denies depression and anxiety this morning. Will continue to monitor patient for safety.     Signature:

## 2021-11-07 NOTE — BH NOTE
Group Therapy Note    Date: 11/7/2021  Start Time: 8026  End Time:  3140  Number of Participants: 5    Type of Group: Cognitive Skills    Wellness Binder Information  Module Name:    Session Number:  1    Patient's Goal:  BEATRIZ    Notes:  Pt very distracted during group, wanting to talk to her mother. Status After Intervention:  Unchanged    Participation Level: Minimal    Participation Quality: Inappropriate and Intrusive      Speech:  pressured      Thought Process/Content: Flight of ideas      Affective Functioning: Blunted      Mood: anxious      Level of consciousness:  Alert, Preoccupied and Inattentive      Response to Learning: Resistant      Endings: None Reported    Modes of Intervention: Education, Support, Socialization and Exploration      Discipline Responsible: Registered Nurse      Signature:   Mary Faria RN

## 2021-11-07 NOTE — PROGRESS NOTES
Around 930 pm patient began pacing more and talking to her self. Patient would say she is okay when asked. patient began to get louder as the time passed. patient would go to her room and either sing to her self or talk loudly to her self and then return to pace the unit. Patient encouraged/redirected to go lay down and try to sleep. Patient eventually laid down around 10 pm and is resting quietly.

## 2021-11-07 NOTE — PROGRESS NOTES
[facility] Adult Unit Daily Assessment  Nursing Progress Note    Room: 0606/606-01   Name: Steffen Muro   Age: 41 y.o. Gender: female   Dx: <principal problem not specified>  Precautions: suicide risk  Inpatient Status: voluntary       SLEEP:    Sleep Quality Good  Sleep Medications: Yes trazodone  PRN Sleep Meds: No       MEDICAL:    Other PRN Meds: No   Med Compliant: Yes  Accu-Chek: No  Oxygen/CPAP/BiPAP: No  CIWA/CINA: No   PAIN Assessment: none  Side Effects from medication: No    Is Patient experiencing any respiratory symptoms (headache, fever, body aches, cough. Tito Irwin ): no  Patient educated by nursing to practice social distancing, wear masks, wash hands frequently: yes      PSYCH:    Depression: 8   Anxiety: 6   SI denies suicidal ideation   HI Negative for homicidal ideation      AVH:hearing voices but will not elaborate on what they are saying      GENERAL:    Appetite: good    Social: No   Speech: normal   Appearance: appropriately dressed, good hygiene and healthy looking    GROUP:    Group Participation: Yes  Participation Quality: Minimal    Notes:      patient is alert, oriented, calm and cooperative with staff. Patient has been constantly pacing the unit this evening. patient does use the phone for a few minutes this evening. Patient has intermittent eye contact during interview. patient reported depression at a 8 and anxiety at a 6 tonight on a scale of 0-10. patient reports that she is olya voices but when asked what they are saying shakes her head vigoriously no. patient will not elaborate on what the voices are saying. patient reports that sh took a bath today and that she slept okay and has a good appetite. Patient denies going to groups today but did attend group this evening. No obvious s/s of distress voiced or noted. Will continue to monitor.      Electronically signed by Ezio Carrion RN on 11/6/21 at 8:44 PM CDT Head is atraumatic. Head shape is symmetrical.

## 2021-11-07 NOTE — GROUP NOTE
Group Therapy Note    Date: 11/7/2021    Group Start Time: 0600  Group End Time: 6809  Group Topic: Recreational    [facility]    Bhavani Eavns RN                                                                        Group Therapy Note    Date: 11/07/21  Start Time: 1600  End Time:1630    Number of Participants: 5    Type of Group: recreation     Patient's Goal:  Play the WII    Notes:  Participated well    Status After Intervention:  Improved    Participation Level:  Active Listener and Interactive    Participation Quality: Appropriate and Attentive    Speech:  normal    Thought Process/Content: Logical  Linear    Affective Functioning: Congruent    Mood: euthymic    Level of consciousness:  Alert and Oriented x4    Response to Learning: Able to verbalize current knowledge/experience and Able to verbalize/acknowledge new learning    Modes of Intervention: Education and Support    Discipline Responsible: Registered Nurse    Signature:  Bhavani Evans RN

## 2021-11-07 NOTE — PROGRESS NOTES
Group Note    Number of Participants in Group: 5  Number of Patients on Unit:6      Patient attended group:Yes  Reason for Absence:  Intervention for patient absence:        Type of Group:    Patient's Goal:     Participation Level: interactive         Patient Response to Learning: Yes    Patient's Behavior: Cooperative and Pleasant    Is Patient Social/Interacting: Yes    Relaxation:   Television:Yes   Reading:No   Game/Puzzle:No   Phone: No       Notes/Comments: Patient attended a group of expressing one's feelings.              Electronically signed by Mustapha Valdez RN on 11/7/21 at 1:39 AM CST

## 2021-11-07 NOTE — PROGRESS NOTES
Group Note    Number of Participants in Group: 5  Number of Patients on Unit:6      Patient attended group:Yes  Reason for Absence:  Intervention for patient absence:        Type of Group:   Wrap-Up/Relaxation    Patient's Goal: See wrap up group sheet    Participation Level:     Active Listener           Patient Response to Learning: No    Patient's Behavior: Withdrawn and Cooperative    Is Patient Social/Interacting: No    Relaxation:   Television:No   Reading:No   Game/Puzzle:No   Phone: No       Notes/Comments:    Patient filled out wrap up sheet    Please see patient's wrap up group sheet for patient's comments       Electronically signed by Marshall Vaz RN on 11/6/21 at 8:31 PM CDT

## 2021-11-07 NOTE — PROGRESS NOTES
S:  Requested by staff to see patient due to elevated BP readings. She has had readings 150/105 but some normal readings. Patient reports that she has not been told anything about elevated BP at her office visits. She denies headache, dizziness, or chest pain. She denies feeling anxious. O:  A&O x 3. Lungs:  CTAB. Heart:  S1&S2 RRR. Plan:  She will be started on Lisinopril 10 mg daily and will continue to monitor BP.

## 2021-11-08 PROCEDURE — 6370000000 HC RX 637 (ALT 250 FOR IP): Performed by: FAMILY MEDICINE

## 2021-11-08 PROCEDURE — 6370000000 HC RX 637 (ALT 250 FOR IP): Performed by: PSYCHIATRY & NEUROLOGY

## 2021-11-08 PROCEDURE — 1240000000 HC EMOTIONAL WELLNESS R&B

## 2021-11-08 PROCEDURE — 6370000000 HC RX 637 (ALT 250 FOR IP): Performed by: NURSE PRACTITIONER

## 2021-11-08 PROCEDURE — 6370000000 HC RX 637 (ALT 250 FOR IP): Performed by: EMERGENCY MEDICINE

## 2021-11-08 PROCEDURE — 99233 SBSQ HOSP IP/OBS HIGH 50: CPT | Performed by: PSYCHIATRY & NEUROLOGY

## 2021-11-08 RX ORDER — QUETIAPINE FUMARATE 300 MG/1
300 TABLET, FILM COATED ORAL NIGHTLY
Status: DISCONTINUED | OUTPATIENT
Start: 2021-11-08 | End: 2021-11-09 | Stop reason: HOSPADM

## 2021-11-08 RX ADMIN — QUETIAPINE FUMARATE 300 MG: 300 TABLET ORAL at 21:10

## 2021-11-08 RX ADMIN — LISINOPRIL 10 MG: 10 TABLET ORAL at 07:46

## 2021-11-08 RX ADMIN — HYDROXYZINE HYDROCHLORIDE 25 MG: 25 TABLET, FILM COATED ORAL at 21:10

## 2021-11-08 RX ADMIN — DOXEPIN HYDROCHLORIDE 75 MG: 50 CAPSULE ORAL at 21:10

## 2021-11-08 RX ADMIN — ACETAMINOPHEN 650 MG: 325 TABLET ORAL at 09:50

## 2021-11-08 RX ADMIN — ACETAMINOPHEN 650 MG: 325 TABLET ORAL at 17:22

## 2021-11-08 RX ADMIN — CYANOCOBALAMIN TAB 500 MCG 500 MCG: 500 TAB at 07:46

## 2021-11-08 RX ADMIN — HYDROXYZINE HYDROCHLORIDE 25 MG: 25 TABLET, FILM COATED ORAL at 15:09

## 2021-11-08 RX ADMIN — DIVALPROEX SODIUM 1000 MG: 500 TABLET, EXTENDED RELEASE ORAL at 07:47

## 2021-11-08 RX ADMIN — PALIPERIDONE 12 MG: 6 TABLET, EXTENDED RELEASE ORAL at 07:46

## 2021-11-08 RX ADMIN — DOXEPIN HYDROCHLORIDE 75 MG: 50 CAPSULE ORAL at 00:46

## 2021-11-08 ASSESSMENT — PAIN DESCRIPTION - ORIENTATION
ORIENTATION: LOWER

## 2021-11-08 ASSESSMENT — PAIN DESCRIPTION - LOCATION
LOCATION: BACK

## 2021-11-08 ASSESSMENT — PAIN DESCRIPTION - FREQUENCY
FREQUENCY: INTERMITTENT
FREQUENCY: INTERMITTENT

## 2021-11-08 ASSESSMENT — PAIN DESCRIPTION - PAIN TYPE
TYPE: ACUTE PAIN
TYPE: CHRONIC PAIN
TYPE: CHRONIC PAIN

## 2021-11-08 ASSESSMENT — PAIN - FUNCTIONAL ASSESSMENT
PAIN_FUNCTIONAL_ASSESSMENT: ACTIVITIES ARE NOT PREVENTED
PAIN_FUNCTIONAL_ASSESSMENT: ACTIVITIES ARE NOT PREVENTED

## 2021-11-08 ASSESSMENT — PAIN SCALES - GENERAL
PAINLEVEL_OUTOF10: 7
PAINLEVEL_OUTOF10: 5
PAINLEVEL_OUTOF10: 2
PAINLEVEL_OUTOF10: 2
PAINLEVEL_OUTOF10: 4
PAINLEVEL_OUTOF10: 0

## 2021-11-08 ASSESSMENT — PAIN DESCRIPTION - PROGRESSION
CLINICAL_PROGRESSION: NOT CHANGED
CLINICAL_PROGRESSION: NOT CHANGED

## 2021-11-08 ASSESSMENT — PAIN DESCRIPTION - ONSET
ONSET: ON-GOING
ONSET: SUDDEN

## 2021-11-08 ASSESSMENT — PAIN DESCRIPTION - DESCRIPTORS
DESCRIPTORS: ACHING
DESCRIPTORS: ACHING

## 2021-11-08 NOTE — PROGRESS NOTES
Patient has not slept at all tonight. patient has barely laid in her bed. Patient has been to the desk multiple times asking for different things and getting snacks. patient has been redirected to her room to lay down multiple times. Patient will go to her room and pace in her room until staff goes in room with patient and puts her to bed and covers her up. patient will then lay with her head propped up to stay awake for a short period before getting back up. MD was called around 1230 am and notified and orders received and given. Patient has continued to come to the desk for various reasons and paced in her room all night.

## 2021-11-08 NOTE — PROGRESS NOTES
[facility] Adult Unit Daily Assessment  Nursing Progress Note    Room: [facility]/606-01   Name: Andrea Singh   Age: 41 y.o. Gender: female   Dx: <principal problem not specified>  Precautions: suicide risk  Inpatient Status: voluntary       SLEEP:    Sleep Quality Good  Sleep Medications: Yes trazodone  PRN Sleep Meds: No       MEDICAL:    Other PRN Meds: No   Med Compliant: Yes  Accu-Chek: No  Oxygen/CPAP/BiPAP: No  CIWA/CINA: No   PAIN Assessment: none  Side Effects from medication: No    Is Patient experiencing any respiratory symptoms (headache, fever, body aches, cough. Almaz Casanova ): no  Patient educated by nursing to practice social distancing, wear masks, wash hands frequently: yes      PSYCH:    Depression: 8   Anxiety: 7  SI denies suicidal ideation   HI Negative for homicidal ideation      AVH:hearing voices and seeing things but will not elaborate on either      GENERAL:    Appetite: good    Social: No   Speech: normal   Appearance: appropriately dressed, good hygiene and healthy looking    GROUP:    Group Participation: Yes  Participation Quality: Minimal    Notes:      patient is alert, oriented, calm and cooperative with staff. Patient has been pacing the unit or sitting in the day area this evening. Patient has intermittent eye contact during interview. patient reported depression at a 8 and anxiety at a 7 tonight on a scale of 0-10. patient reports that she is hearing voices and seeing things but when asked what they are saying shakes her head vigoriously no. patient will not elaborate on what the voices are saying or what she is seeing. patient reports that sh took a bath today and that she slept okay and has a good appetite. Patient is evasive during interview acting as if she does not really want to speak with staff. No obvious s/s of distress voiced or noted. Will continue to monitor.      Electronically signed by Ruddy Farfan RN on 11/7/21 at 8:22 PM CDT

## 2021-11-08 NOTE — PROGRESS NOTES
Group Note    Number of Participants in Group: 7  Number of Patients on Unit:6      Patient attended group:Yes  Reason for Absence:  Intervention for patient absence:        Type of Group:   Wrap-Up/Relaxation    Patient's Goal: See wrap up group sheet    Participation Level:   interactive          Patient Response to Learning: Yes    Patient's Behavior: Cooperative    Is Patient Social/Interacting: Yes    Relaxation:   Television:No   Reading:No   Game/Puzzle:No   Phone: No       Notes/Comments: Patient attended another group of healthy life styles.        Please see patient's wrap up group sheet for patient's comments       Electronically signed by Malathi Mar RN on 11/8/21 at 1:09 AM CST

## 2021-11-08 NOTE — PROGRESS NOTES
Department of Psychiatry  Attending Progress Note      SUBJECTIVE:    41 y. o. female with previous psychiatric history of schizophrenia, who has been admitted to our psychiatric unit secondary to worsening of auditory hallucinations.     Patient has been seen in treatment team room. She reported that her condition did not significantly change during this hospital stay, however, stated that her mood is \"better\" today. Patient continues to report feeling of depression and anxiety, and rated both of them as 7 out of 10, with 10 being the worst.  2 days ago patient reported that her depression and anxiety is 6, however, she could not explain why she rated anxiety and depression higher today. Patient endorses good appetite. Initially, she stated that she slept well during the last night, however, when I informed the patient that medical staff reported the patient was not sleeping half of the night, she confirmed that she was not able to sleep until midnight. Patient stated that she slept the rest of the night well after she was provided with dose of doxepin. Patient attends group activities in the unit and participates in those activities. She is social with medical staff only when it is related to treatment plan. Patient is not social with other patients in the unit. She performs her ADLs daily. She denies current active suicidal or homicidal ideations, denies any plans. Patient continues to endorse auditory hallucinations, however could not describe her hallucinations, stated that she hears \"sounds and noises\". She denies any hallucinations at time of the interview, stated that last time when she experienced hallucinations is yesterday evening. Patient did not report any paranoid ideations or delusional thoughts.       OBJECTIVE    Physical  VITALS:  BP (!) 123/96   Pulse 99   Temp 97.5 °F (36.4 °C) (Temporal)   Resp 20   Ht 5' 2\" (1.575 m)   Wt 187 lb 6.4 oz (85 kg)   SpO2 97%   BMI 34.28 kg/m² TEMPERATURE:  Current - Temp: 97.5 °F (36.4 °C); Max - Temp  Av.4 °F (36.3 °C)  Min: 97.3 °F (36.3 °C)  Max: 97.5 °F (36.4 °C)  RESPIRATIONS RANGE: Resp  Av  Min: 18  Max: 20  PULSE RANGE: Pulse  Av  Min: 97  Max: 101  BLOOD PRESSURE RANGE:  Systolic (89XRG), OCK:555 , Min:123 , HYP:645   ; Diastolic (65BOK), BDE:54, Min:94, Max:107    PULSE OXIMETRY RANGE: SpO2  Av %  Min: 97 %  Max: 99 %    Review of Systems: 14 point review of systems is negative    Psychological ROS: Positive for presence of anxiety, depression, hallucinations    Mental Status Examination:   Appearance: Appropriately groomed, wearing casual civilian clothes. Made good eye contact. Behavior: Slightly withdrawn, calm, cooperative with interview, socially appropriate. Mild psychomotor retardation appreciated. Gait unremarkable. Speech: Normal in tone, volume and quality. Mood: \"Better\"   Affect: Mood congruent. Range is mildly restricted  Thought Process: Mostly circumstantial, sometimes linear and goal oriented. Thought Content: Patient does not have any current active suicidal and homicidal ideations. No overt delusions or paranoia appreciated. Perceptions: Seems patient does not have any auditory or visual hallucinations at present time. Patient did not appear to be responding to internal stimuli. Orientation: to person, place and situation. Alert. Impulsivity: Fair. Neurovegitative: Good appetite, decreased sleep.    Insight: Limited  Judgment: Limited    Data  No new lab test results to review    Medications  Current Facility-Administered Medications: lisinopril (PRINIVIL;ZESTRIL) tablet 10 mg, 10 mg, Oral, Daily  QUEtiapine (SEROQUEL) tablet 200 mg, 200 mg, Oral, Nightly  hydrOXYzine (ATARAX) tablet 25 mg, 25 mg, Oral, TID PRN  traZODone (DESYREL) tablet 100 mg, 100 mg, Oral, Nightly  loperamide (IMODIUM) capsule 2 mg, 2 mg, Oral, 4x Daily PRN  paliperidone (INVEGA) extended release tablet 12 mg, 12 mg, Oral, Daily  cloNIDine (CATAPRES) tablet 0.1 mg, 0.1 mg, Oral, Q4H PRN  vitamin B-12 (CYANOCOBALAMIN) tablet 500 mcg, 500 mcg, Oral, Daily  divalproex (DEPAKOTE ER) extended release tablet 1,000 mg, 1,000 mg, Oral, Daily  haloperidol lactate (HALDOL) injection 5 mg, 5 mg, IntraMUSCular, Q6H PRN  LORazepam (ATIVAN) injection 2 mg, 2 mg, IntraMUSCular, Q6H PRN  nicotine (NICODERM CQ) 21 MG/24HR 1 patch, 1 patch, TransDERmal, Daily  acetaminophen (TYLENOL) tablet 650 mg, 650 mg, Oral, Q4H PRN  polyethylene glycol (GLYCOLAX) packet 17 g, 17 g, Oral, Daily PRN    ASSESSMENT AND PLAN    DSM 5 DIAGNOSIS:  Chronic schizophrenia  Tobacco use disorder, severe  Family relationship issues  Vitamin B12 deficiency  Insomnia     Plan:   1. Psychiatric Medications:   Continue current psychotropic medications as recommended.    Patient denies side effect of currently prescribed medications. Will discontinue trazodone due to lack of the effect and will start patient on doxepin 75 mg at bedtime for insomnia. Will increase dose of Seroquel from 200 mg to 300 mg at bedtime for mood stabilization, psychosis and insomnia. The risks, benefits, side effects, indications, contraindications, alternatives and adverse effects of the medications have been discussed with patient.     2. Medical Issues:    Continue medical monitoring by Dr. Rodríguez Peña and associates.       3.  Disposition:    Discharge patient home when she will be in stable mental condition and adjustment psychotropic medications will be done.     Amount of time spent with patient:    35 minutes with greater than 50% of the time spent in counseling and collaboration of care

## 2021-11-08 NOTE — PROGRESS NOTES
Treatment Team Note:     SANTOS met with [address] team to discuss Pts TX and DC plans.      Progress/Behavior/Group Attendance: TBD     Target Symptoms/Reason for admission:  Patient admitted to [facility] due to female with a history of schizophrenia presents with increasing hallucinations. Jessica Soliman is quiet when I ask if any of them are threatening to her or if she had desire to hurt herself or anyone else. Jessica Soliman is cooperative.  Does not seem anxious.  Denies any injury or abuse.   Diagnoses: Chronic schizophrenia, Tobacco use disorder, severe, Family relationship issues  UDS: Neg  BAL:  Neg        AftercarePlan: [facility]     Pt lives with: fom     Collateral obtained from: mom  On:11/2/21     Family Session: YULY     Misc:

## 2021-11-08 NOTE — PROGRESS NOTES
Group Note    Number of Participants in Group: 6  Number of Patients on Unit:7      Patient attended group:Yes  Reason for Absence:  Intervention for patient absence:        Type of Group:   Wrap-Up/Relaxation    Patient's Goal: See wrap up group sheet    Participation Level:  interactive         Patient Response to Learning: Yes    Patient's Behavior: cooperative    Is Patient Social/Interacting: Yes    Relaxation:   Television:No   Reading:No   Game/Puzzle:No   Phone: No       Notes/Comments: Patient attended another group of healthy life styles.        Please see patient's wrap up group sheet for patient's comments       Electronically signed by Kellee Jiang RN on 11/8/21 at 12:30 AM CST

## 2021-11-08 NOTE — PROGRESS NOTES
Group Note  Date: 11/8/21  Start Time: 0800  End Time:  0830  Number of Participants in Group: 6  Number of Patients on Unit: 7  Reason for Absence: n/a  Intervention for patient absence: n/a       Type of Group:  Community __    [place] __    Psychoeducational __  Spirituality__                               Cognitive Skills__  Recovery __  Wellness _x_  Recreation __                                            Wrap-Up/Relaxation __       Problem: Chronic schizophrenia      Goal for Group: Self-inventory       Staff Intervention: Worksheet and communication      Participation Level:  High _x__   Medium ___   Low____     Patient Behavior:  Quiet __  Attentive __  Disruptive ___ Poor Eye-Contact ___                                  Good Eye-Contact___  Insightful ___  Negative ____                                 Distracted__  Defensive ___  Agitated___  Cooperative _x__                                 Needed prompted__  Monopolizing ___ Oppositional___       Mental Status/Mood: Depressed__  Tearful__  Manic__  Elevated ___ Flat_x__                                      Blunted __  Grandiose___  Angry__  Labile ___                                      Congruent___  Incongruent___ Sad ___ Hallucinations ___                                      Delusions___  Anxious ____       Patient Response to Learning: Good       Notes/Comments: Patient reports tiredness this am, did not sleep well last night. Reports \"just want to be happy\". Is cooperative this am, needs redirection at times.           Discipline Responsible: RN     Signature (with credentials): Electronically signed by Esperanza Adams RN on 11/8/2021 at 9:59 AM

## 2021-11-08 NOTE — BH NOTE
Group Therapy Note    Date: 11/8/2021  Start Time: 1330  End Time:  1400  Number of Participants: 3    Type of Group: Spirituality     Wellness Binder Information  Module Name:  Mindfulness  Session Number:      Patient's Goal:  To rest the mind by focusing on the present. Notes:      Status After Intervention:  Improved    Participation Level:  Active Listener    Participation Quality: Appropriate and Attentive      Speech:  normal      Thought Process/Content:       Affective Functioning: Congruent      Mood: euthymic, calm      Level of consciousness:  Oriented x4      Response to Learning: Able to verbalize current knowledge/experience and Capable of insight      Endings:     Modes of Intervention: Education, Support and Activity      Discipline Responsible:       Signature:  Jan Hawkins MA [facility]

## 2021-11-08 NOTE — BH NOTE
Group Therapy Note    Date: 11/7/2021  Start Time: 0800  End Time:  0830  Number of Participants: 5    Type of Group: Community Meeting    Wellness [address] [place] [address]  Module Name:  Self-Inventory   Session Number:  1    Patient's Goal:  Review thoughts, feelings, concerns    Notes:      Status After Intervention:  Unchanged    Participation Level: Active Listener    Participation Quality: Appropriate      Speech:  pressured      Thought Process/Content: Flight of ideas      Affective Functioning: Congruent      Mood: anxious      Level of consciousness:  Preoccupied      Response to Learning: Able to verbalize/acknowledge new learning      Endings: None Reported    Modes of Intervention: Exploration      Discipline Responsible: Registered Nurse      Signature:   Paola Smith RN

## 2021-11-08 NOTE — PROGRESS NOTES
B[facility] Daily Shift Assessment  Nursing Progress Note    [facility]    Room: [facility] Angelica Pierre    Name: Heydi Barboza  Age: 41   Gender: F   Dx: Hallucinations  Precautions: suicide  Target Symptoms: hallucinations   Accu-Chek: no  Sleep Quality: 8 hrs  SI: no  HI: no  AVH: Yes, voices - unclear. ADLs: no  Speech: blunt, minimal  Depression: mild  Anxiety: mild  Participation Quality: low  Appetite: 25%  Respiratory symptoms: no  Headache: no  Body aches: no  Fever: no  Cough: no  Patients encouraged to wear masks / wash hands frequently / practice social distancing: yes  Visitation: n/a  Complaints: no    Notes: Patient minimally responsive in conversation or non-verbal interaction, participating a small amount in activities, self care not done, thought organized, affect flat, reports no problems.     Signature: Natasha Oneal RN

## 2021-11-09 VITALS
RESPIRATION RATE: 18 BRPM | DIASTOLIC BLOOD PRESSURE: 76 MMHG | OXYGEN SATURATION: 95 % | BODY MASS INDEX: 34.48 KG/M2 | HEART RATE: 102 BPM | HEIGHT: 62 IN | SYSTOLIC BLOOD PRESSURE: 115 MMHG | TEMPERATURE: 97.7 F | WEIGHT: 187.4 LBS

## 2021-11-09 PROCEDURE — 6370000000 HC RX 637 (ALT 250 FOR IP): Performed by: NURSE PRACTITIONER

## 2021-11-09 PROCEDURE — 6370000000 HC RX 637 (ALT 250 FOR IP): Performed by: FAMILY MEDICINE

## 2021-11-09 PROCEDURE — 6370000000 HC RX 637 (ALT 250 FOR IP): Performed by: EMERGENCY MEDICINE

## 2021-11-09 PROCEDURE — 5130000000 HC BRIDGE APPOINTMENT

## 2021-11-09 PROCEDURE — 99239 HOSP IP/OBS DSCHRG MGMT >30: CPT | Performed by: PSYCHIATRY & NEUROLOGY

## 2021-11-09 PROCEDURE — 6370000000 HC RX 637 (ALT 250 FOR IP): Performed by: PSYCHIATRY & NEUROLOGY

## 2021-11-09 RX ORDER — DOXEPIN HYDROCHLORIDE 75 MG/1
75 CAPSULE ORAL NIGHTLY
Qty: 30 CAPSULE | Refills: 1 | Status: SHIPPED | OUTPATIENT
Start: 2021-11-09 | End: 2021-11-16 | Stop reason: SDUPTHER

## 2021-11-09 RX ORDER — DIVALPROEX SODIUM 500 MG/1
1000 TABLET, EXTENDED RELEASE ORAL DAILY
Qty: 60 TABLET | Refills: 1 | Status: SHIPPED | OUTPATIENT
Start: 2021-11-10 | End: 2021-11-18

## 2021-11-09 RX ORDER — LISINOPRIL 10 MG/1
10 TABLET ORAL DAILY
Qty: 30 TABLET | Refills: 1 | Status: SHIPPED | OUTPATIENT
Start: 2021-11-10 | End: 2022-02-08 | Stop reason: SDUPTHER

## 2021-11-09 RX ORDER — PALIPERIDONE 6 MG/1
12 TABLET, EXTENDED RELEASE ORAL DAILY
Qty: 60 TABLET | Refills: 1 | Status: SHIPPED | OUTPATIENT
Start: 2021-11-10

## 2021-11-09 RX ORDER — QUETIAPINE FUMARATE 300 MG/1
300 TABLET, FILM COATED ORAL NIGHTLY
Qty: 30 TABLET | Refills: 1 | Status: SHIPPED | OUTPATIENT
Start: 2021-11-09

## 2021-11-09 RX ADMIN — CYANOCOBALAMIN TAB 500 MCG 500 MCG: 500 TAB at 07:54

## 2021-11-09 RX ADMIN — LISINOPRIL 10 MG: 10 TABLET ORAL at 07:54

## 2021-11-09 RX ADMIN — PALIPERIDONE 12 MG: 6 TABLET, EXTENDED RELEASE ORAL at 07:54

## 2021-11-09 RX ADMIN — DIVALPROEX SODIUM 1000 MG: 500 TABLET, EXTENDED RELEASE ORAL at 07:54

## 2021-11-09 NOTE — PROGRESS NOTES
[facility]  Discharge Note  Bridge Appointment completed: Reviewed Discharge Instructions with patient. Patient verbalizes understanding and agreement with the discharge plan using the teachback method. Referral for Outpatient Tobacco Cessation Counseling, upon discharge (mayra X if applicable and completed):    ( )  Hospital staff assisted patient to call Quit Line or faxed referral                                   during hospitalization                  ( )  Recognizing danger situations (included triggers and roadblocks), if not completed on admission                    ( )  Coping skills (new ways to manage stress, exercise, relaxation techniques, changing routine, distraction), if not completed on admission                                                           ( )  Basic information about quitting (benefits of quitting, techniques in how to quit, available resources, if not completed on admission  ( ) [facility]   (x ) Patient refused referral  (x ) Patient refused counseling  (x ) Patient refused smoking cessation medication upon discharge    Vaccinations (mayra X if applicable and completed):  ( ) Patient states already received influenza vaccine elsewhere  ( ) Patient received influenza vaccine during this hospitalization  (x ) Patient refused influenza vaccine at this time      Pt discharged with followings belongings:   Dentures: None  Vision - Corrective Lenses: None  Hearing Aid: None  Jewelry: Other (Comment) (see belongings document)  Body Piercings Removed: N/A  Clothing: Other (Comment) (see belongings document)  Were All Patient Medications Collected?: Not Applicable  Other Valuables: Other (Comment) (see belongings document)   Valuables sent home with patient. Valuables retrieved from safe and returned to patient. Patient left department with sister   via car  , discharged to home  .  Patient education on aftercare instructions: yes  Patient verbalize understanding of AVS:  yes. Suicidal Ideations? No AVH? auditory HI? Negative for homicidal ideation       Given discharge instructions to sister and patient.

## 2021-11-09 NOTE — BH NOTE
Received PA request for Doxepin medication from [facility], Key: [facility] for PA request, once [employer] has reviewed submission, if further information is required will submit documentation for medication. Electronically signed, Chela Catherine.  Vernon CUNNINGHAM, Utilization Review , [facility]

## 2021-11-09 NOTE — PROGRESS NOTES
[facility] Adult Unit Daily Assessment  Nursing Progress Note    Room: [facility]/606-01   Name: Miguel A Lowery   Age: 41 y.o. Gender: female   Dx: <principal problem not specified>  Precautions: suicide risk  Inpatient Status: voluntary       SLEEP:    Sleep Quality poor  Sleep Medications: Yes sinequan  PRN Sleep Meds: No       MEDICAL:    Other PRN Meds: yes  Med Compliant: Yes  Accu-Chek: No  Oxygen/CPAP/BiPAP: No  CIWA/CINA: No   PAIN Assessment: none  Side Effects from medication: No    Is Patient experiencing any respiratory symptoms (headache, fever, body aches, cough. Lawernce Roughen ): no  Patient educated by nursing to practice social distancing, wear masks, wash hands frequently: yes      PSYCH:    Depression: 6  Anxiety: 7  SI denies suicidal ideation   HI Negative for homicidal ideation      AVH:hearing voices    GENERAL:    Appetite: good    Social: No   Speech: normal   Appearance: appropriately dressed, good hygiene and healthy looking    GROUP:    Group Participation: Yes  Participation Quality: Minimal    Notes:      patient is alert, oriented, calm and cooperative with staff. Patient has been pacing the unit or sitting in the day area this evening. Patient has intermittent eye contact during interview. patient reported depression at a 6 and anxiety at a 7 tonight on a scale of 0-10. patient reports that she is hearing voices but can not tell what  They are saying. patient reports that she took a shower today and has a good appetite. Patient is quiet during interview. patient is hoping for better sleep tonight. No obvious s/s of distress voiced or noted. Will continue to monitor.      Electronically signed by Conner Dick RN on 11/8/21 at 8:51 PM CDT

## 2021-11-09 NOTE — PROGRESS NOTES
Treatment Team Note:     SANTOS met with [address] team to discuss Pts TX and DC plans.      Progress/Behavior/Group Attendance: TBD     Target Symptoms/Reason for admission:  Patient admitted to [facility] due to female with a history of schizophrenia presents with increasing hallucinations. Cheyenne Hooper is quiet when I ask if any of them are threatening to her or if she had desire to hurt herself or anyone else. Cheyenne Hooper is cooperative.  Does not seem anxious.  Denies any injury or abuse.   Diagnoses: Chronic schizophrenia, Tobacco use disorder, severe, Family relationship issues  UDS: Neg  BAL:  Neg        AftercarePlan: [facility]     Pt lives with: fom     Collateral obtained from: mom  On:11/2/21     Family Session: YULY     Misc:

## 2021-11-09 NOTE — PROGRESS NOTES
Progress Note  Radha Medeiros  11/9/2021 9:12 AM  Subjective:   Admit Date:   11/1/2021      CC/ADMIT DX:       Interval History:   Reviewed overnight events and nursing notes. She denies any new physical complaints. I have reviewed all labs/diagnostics from the last 24hrs. ROS:   I have done a 10 point ROS and all are negative, except what is mentioned in the HPI. ADULT DIET; Regular; 5 carb choices (75 gm/meal)    Medications:      doxepin  75 mg Oral Nightly    QUEtiapine  300 mg Oral Nightly    lisinopril  10 mg Oral Daily    paliperidone  12 mg Oral Daily    vitamin B-12  500 mcg Oral Daily    divalproex  1,000 mg Oral Daily    nicotine  1 patch TransDERmal Daily           Objective:   Vitals: /76   Pulse 102   Temp 97.7 °F (36.5 °C) (Temporal)   Resp 18   Ht 5' 2\" (1.575 m)   Wt 187 lb 6.4 oz (85 kg)   SpO2 95%   BMI 34.28 kg/m²  No intake or output data in the 24 hours ending 11/09/21 0912  General appearance: alert and cooperative with exam  Extremities: extremities normal, atraumatic, no cyanosis or edema  Neurologic:  No obvious focal neurologic deficits. Skin: no rashes    Assessment and Plan: Active Problems:    Hallucinations    Schizophrenia ([address]  Resolved Problems:    * No resolved hospital problems. *    Elevated BP    Plan:  1. Continue present medication(s)   2. She is medically stable. I will monitor for any changes or concerns. 3.  Follow with Psych      Discharge planning:   her home     Reviewed treatment plans with the patient and/or family.              Electronically signed by Dedra Brower MD on 11/9/2021 at 9:12 AM

## 2021-11-09 NOTE — PLAN OF CARE
Problem: Anger Management/Homicidal Ideation:  Goal: Able to display appropriate communication and problem solving  Outcome: Completed  Goal: Ability to verbalize frustrations and anger appropriately will improve  Outcome: Completed  Goal: Absence of angry outbursts  Outcome: Completed  Goal: Absence of homicidal ideation  Outcome: Completed  Goal: Participates in care planning  Outcome: Completed     Problem: Risk of Harm:  Goal: Ability to remain free from injury will improve  Outcome: Completed     Problem: Pain:  Goal: Pain level will decrease  Outcome: Completed  Goal: Control of acute pain  Outcome: Completed  Goal: Control of chronic pain  Outcome: Completed

## 2021-11-09 NOTE — DISCHARGE SUMMARY
Discharge Summary     Patient ID:  Laith Garcia  [id number]  50 y.o.  1975    Admit date: 11/1/2021  Discharge date: 11/9/2021    Admitting Physician: Carlos Agrawal MD   Attending Physician: Carlos Agrawal MD  Discharge Provider: Lina Phillips MD     Admission Diagnoses: Schizophrenia, chronic condition with acute exacerbation ([facility] [address] [F20.9]  Hallucinations [R44.3]  Schizophrenia ([facility] [address] [F20.9]    Discharge Diagnoses: Chronic schizophrenia  Tobacco use disorder, severe  Family relationship issues  Vitamin B12 deficiency  Insomnia    Admission Condition: poor    Discharged Condition: stable    Indication for Admission: Worsening of hallucinations    HPI:   The patient is a 41 y.o. female with previous psychiatric history of schizophrenia, who has been admitted to our psychiatric unit secondary to worsening of auditory hallucinations.     Patient is well-known to psychiatry due to previous admissions to our psychiatric unit and consults, when patient was admitted to medical services.     Patient has been seen in treatment team room with presence of the patient's nurse. Patient reported that she has been diagnosed with schizophrenia. She stated that she continues to experience auditory and visual hallucinations, despite the fact that she was compliant with prescribed psychotropic medications, stated \"they probably stopped working\". She describes visual hallucinations as \"I see shadows\", and described her auditory hallucinations, as multiple unfamiliar voices, noises and sounds. Patient stated when she hears the voices, they talk to her and they talk to each other and sometimes tell here that she is the bad person. She reported that last time when she experienced auditory and visual hallucinations is yesterday before admission to the hospital.  Patient denies any auditory and visual hallucinations during the interview.   However, patient stated that voices getting worse recently.     Patient stated that she frequently has arguments with her mother and her sister and yesterday she was involved in a verbal fight with her older sister. Patient stated that her mother and her sister recommended to come to the hospital and ask for help due to being aggressive and agitated.     Today during the interview, patient reported mild feeling of depression and mild anxiety. She denies feeling of hopelessness, helplessness and worthlessness. Patient endorses good appetite and good quality of sleep at home. She denies current active suicidal or homicidal ideations, denies any plans.        PSYCHIATRIC HISTORY:    Diagnoses: Schizophrenia  Suicide attempts/gestures: Denies   Prior hospitalizations: Multiple, to a different psychiatric hospital in our [facility] hospital, including [facility] and [facility], with last admission 2-3 years ago  Medication trials: Paliperidone, Zyprexa, Depakote  [facility]  Head trauma: Denies      Hospital Course:   Patient was admitted to the adult psych behavioral health floor and was acclimated to the floor. Labs were reviewed and physical exam was completed by Dr. Nela Raman and associates. Home medications were reconciled. JEFFERSON was obtained and reviewed. Medication changes were made and patient tolerated well with no side effects. During the hospital stay patient's home medications have been restarted the previously prescribed and recommended dose, with an exception, dose of Depakote ER was increased from 500 mg to 1000 mg once a day for mood stabilization, considering the fact that Depakote level in ER was significantly below therapeutic level - 38.5. Also, Zyprexa was discontinued due to negative effect of smoking for concentration of medication, as patient smokes up to 3 packs of cigarettes a day, and she was started on Invega 6 mg daily for psychosis.   Dose of Invega was titrated up as indicated and tolerated by patient until it reached maximum approval dose of 12 mg once a day. Patient continued to report auditory and visual hallucinations and she has been started on Seroquel 50 mg at bedtime. Dose of Seroquel was titrated up and at the day of discharge dose of Seroquel is 300 mg at bedtime. Trazodone was discontinued due to lack of the effect, and patient was started on doxepin 75 mg at bedtime with beneficial effect. Patient was provided with nicotine patch 21 mg / 24 hours for nicotine replacement. Patient attended and participated in groups. The patient did interact well with other patients and staff on the unit. Behaviorally she was not a problem. Patient was compliant with her medications. Patient was sleeping through the night. This patient is not suicidal or homicidal at discharge. She does not have any visual hallucinations, continues to report auditory hallucinations, which significantly decreased in frequency and intensity. With the above mentioned medications changes as well as psychotherapeutic interventions, the patient reported considerable improvement in her condition. On 11/09/2021 it was therefore decided to discharge the patient, as it was felt that she received maximal benefit from her hospitalization.       Number of antipsychotic medication prescribed at discharge: Two - Invega, Seroquel  IF MORE THAN ONE IS USED: Yes    History of greater than 3 failed multiple monotherapy trials: NA  Monotherapy taper plan/ cross taper in progress: NA  Augmentation of Clozapine: NA    Referral to addiction treatment: NA    Prescription for Alcohol or Drug Disorder Medication: NA    Prescription for Tobacco Cessation medication: Patient refused    If no prescriptions for Tobacco Cessation must document why: Patient refused    Consults: Internal medicine    Significant Diagnostic Studies:     Lab Results   Component Value Date    WBC 9.1 11/01/2021    HGB 12.0 11/01/2021    HCT 38.6 11/01/2021    MCV 76.4 (L) 11/01/2021     11/01/2021 Lab Results   Component Value Date     11/01/2021    K 3.6 11/01/2021     11/01/2021    CO2 25 11/01/2021    BUN 5 (L) 11/01/2021    CREATININE 0.6 11/01/2021    GLUCOSE 134 (H) 11/01/2021    CALCIUM 9.7 11/01/2021    PROT 7.8 11/01/2021    LABALBU 4.1 11/01/2021    BILITOT <0.2 11/01/2021    ALKPHOS 87 11/01/2021    AST 8 11/01/2021    ALT 8 11/01/2021    LABGLOM >60 11/01/2021    GFRAA >59 11/01/2021    GLOB 3.0 05/02/2017       Lab Results   Component Value Date    TSHFT4 1.69 11/03/2021    TSH 1.720 09/20/2021     Lab Results   Component Value Date    RUFDRHJI88 363 11/03/2021     Lab Results   Component Value Date    VITD25 30.2 11/03/2021       Treatments: therapies: RN and SW    Mental Status Examination:  Alert, Oriented X 4  Appearance:  Proper Grooming and Hygiene  Speech with Regular Rate and Rhythm  Eye Contact:  Good  No Psychomotor Agitation/Retardation Noted  Attitude:  Cooperative  Mood:  \"better\"  Affective: Congruent, appropriate to the situation, with a normal range and intensity  Thought Processes: Mostly circumstantial, sometimes logical and goal oriented  Thought Content:  No Suicidal Ideation, No Homicidal Ideation, No Overt Delusions, No visual hallucinations, positive for presence of auditory hallucinations  Insight: Limited  Judgement: Limited  Memory is intact for both remote and recent  Intellectual Functioning: Below average  Fund of Knowledge: Below average  Attention and Concentration: Slightly decreased      Discharge Exam:  Please, see medical note    Disposition: home    Patient Instructions:   Current Discharge Medication List      START taking these medications    Details   doxepin (SINEQUAN) 75 MG capsule Take 1 capsule by mouth nightly  Qty: 30 capsule, Refills: 1      lisinopril (PRINIVIL;ZESTRIL) 10 MG tablet Take 1 tablet by mouth daily  Qty: 30 tablet, Refills: 1      paliperidone (INVEGA) 6 MG extended release tablet Take 2 tablets by mouth daily  Qty: 60 tablet, Refills: 1      QUEtiapine (SEROQUEL) 300 MG tablet Take 1 tablet by mouth nightly  Qty: 30 tablet, Refills: 1      vitamin B-12 500 MCG tablet Take 1 tablet by mouth daily  Qty: 30 tablet, Refills: 1         CONTINUE these medications which have CHANGED    Details   divalproex (DEPAKOTE ER) 500 MG extended release tablet Take 2 tablets by mouth daily  Qty: 60 tablet, Refills: 1         CONTINUE these medications which have NOT CHANGED    Details   albuterol sulfate  (90 Base) MCG/ACT inhaler Inhale 2 puffs into the lungs every 6 hours as needed for Wheezing  Qty: 8.5 g, Refills: 2    Associated Diagnoses: Stage 2 moderate COPD by GOLD classification ([facility])      ferrous sulfate (IRON 325) 325 (65 Fe) MG tablet Take 1 tablet by mouth 2 times daily (with meals)  Qty: 30 tablet, Refills: 0      docusate sodium (COLACE) 100 MG capsule TAKE 1 CAPSULE BY MOUTH TWICE DAILY  Qty: 60 capsule, Refills: 11    Associated Diagnoses: Slow transit constipation      metFORMIN (GLUCOPHAGE) 500 MG tablet TAKE 1 TABLET BY MOUTH TWICE DAILY WITH MEALS  Qty: 60 tablet, Refills: 5      vitamin D (ERGOCALCIFEROL) 1.25 MG (28392 UT) CAPS capsule TAKE 1 CAPSULE BY MOUTH 1 TIME A WEEK AFTER A MEAL  Qty: 4 capsule, Refills: 5    Associated Diagnoses: Vitamin D deficiency      INCRUSE ELLIPTA 62.5 MCG/INH AEPB INHALE 1 PUFF EVERY DAY AS DIRECTED  Qty: 30 each, Refills: 0    Comments: Patient will need appointment for further refills      tiotropium (SPIRIVA RESPIMAT) 2.5 MCG/ACT AERS inhaler Inhale 2 puffs into the lungs daily  Qty: 1 Inhaler, Refills: 2    Associated Diagnoses: Stage 2 moderate COPD by GOLD classification ([facility])      loratadine (CLARITIN) 10 MG tablet TAKE 1 TABLET BY MOUTH DAILY  Qty: 30 tablet, Refills: 3         STOP taking these medications       OLANZapine (ZYPREXA) 20 MG tablet Comments:   Reason for Stopping:             Activity: activity as tolerated  Diet: cardiac diet  Wound Care: none needed    Follow-up with [facility]  provider in 2 weeks.     Time worked: More than 31 minutes    Participation: good    Electronically signed by Jose Lipscomb MD on 11/9/2021 at 8:34 AM

## 2021-11-09 NOTE — PROGRESS NOTES
Group Note    Number of Participants in Group: 4  Number of Patients on Unit:6      Patient attended group:Yes  Reason for Absence:  Intervention for patient absence:        Type of Group:   Wrap-Up/Relaxation    Patient's Goal: See wrap up group sheet    Participation Level:     Active Listener and Interactive           Patient Response to Learning: Yes    Patient's Behavior: Withdrawn and Cooperative    Is Patient Social/Interacting: Yes    Relaxation:   Television:No   Reading:No   Game/Puzzle:No   Phone: No       Notes/Comments:      Please see patient's wrap up group sheet for patient's comments       Electronically signed by Lexii Barreto RN on 11/8/21 at 11:24 PM CST

## 2021-11-09 NOTE — PROGRESS NOTES
Discharge Note     Pt discharging on this date. Pt denies SI, HI, and AVH at this time. Pt reports improvement in behavior and is leaving unit in overall good condition. SW and pt discussed pt's follow up appointments and importance of attending appointments as scheduled, pt voiced understanding and agreement. Pt and SW also discussed pt safety plan and pt able to verbally identify: warning signs, coping strategies, places and people that help make the pt feel better/distract negative thoughts, friends/family/agencies/professionals the pt can reach out to in a crisis, and something that is important to the pt/worth living for. Pt provided the national suicide prevention hotline number (3-206-537-967-021-1088) as well as local community behavioral health [facility]) crisis number for emergencies (8-735-629-319-180-2473). Pt to follow up with: [address] ([address]) on 11/10/2021 at 4pm with Lu Montalvo. Patient will follow up with [address] for medication management, patient will be seen on 11/18/2021 at 8:30 am with [facility] for the med management appt. Referral to out patient tobacco cessation counseling treatment: Patient refused referral to outpatient tobacco cessation counseling. SW offered to assist pt with transportation, pt reports having transportation.      Electronically signed by Femi Fernandez LPC on 11/9/2021 at 10:59 AM

## 2021-11-09 NOTE — BH NOTE
Submitted additional clinical documentation for PA request, PA Case ID: [id number], possible response time 24 to 72 hours. Expedited request for medication related to patient's discharge. Electronically signed, Assunta Cheadle.  Vernon CUNNINGHAM, Utilization Review , [facility]

## 2021-11-09 NOTE — BH NOTE
Received notification that PA Case ID: [id number] was denied by [employer], an appeal was submitted with historical documentation from patient's chart to support that guidelines for medications was met per the historical record as well as patient's current record. Submitted for appeal of the decision at this time, uploaded supporting documentation to the web portal for review, asked for PA case appeal to be expedited at this time. Electronically signed, Lisa Da Silva.  Vernon CUNNINGHAM, Utilization Review , [facility]

## 2021-11-09 NOTE — PROGRESS NOTES
Group Note  Date: 11/9/21  Start Time: 0800  End Time:  0830  Number of Participants in Group: 4  Number of Patients on Unit: 8  Reason for Absence: n/a  Intervention for patient absence: n/a       Type of Group:  Community __    [place] __    Psychoeducational __  Spirituality__                               Cognitive Skills__  Recovery __  Wellness x__  Recreation __                                            Wrap-Up/Relaxation __       Problem: Schizophrenia      Goal for Group: Self-inventory      Staff Intervention: Worksheet and communication      Participation Level:  High _x__   Medium ___   Low____     Patient Behavior:  Quiet __  Attentive __  Disruptive ___ Poor Eye-Contact ___                                  Good Eye-Contact___  Insightful ___  Negative ____                                 Distracted__  Defensive ___  Agitated___  Cooperative _x__                                 Needed prompted_x_  Monopolizing ___ Oppositional___       Mental Status/Mood: Depressed__  Tearful__  Manic__  Elevated ___ Flat___                                      Blunted __  Grandiose___  Angry__  Labile ___                                      Congruent_x__  Incongruent___ Sad ___ Hallucinations ___                                      Delusions___  Anxious ____  Irritable__x_       Patient Response to Learning: Needs redirection. Patient wants to go home today.        Notes/Comments:            Discipline Responsible: RN     Signature (with credentials):  Electronically signed by Juvenal Morales RN on 11/9/2021 at 9:51 AM

## 2021-11-16 ENCOUNTER — OFFICE VISIT (OUTPATIENT)
Dept: PRIMARY CARE CLINIC | Age: 46
End: 2021-11-16
Payer: MEDICAID

## 2021-11-16 VITALS
BODY MASS INDEX: 39.72 KG/M2 | WEIGHT: 197 LBS | TEMPERATURE: 97.1 F | RESPIRATION RATE: 18 BRPM | DIASTOLIC BLOOD PRESSURE: 82 MMHG | OXYGEN SATURATION: 97 % | HEART RATE: 90 BPM | SYSTOLIC BLOOD PRESSURE: 134 MMHG | HEIGHT: 59 IN

## 2021-11-16 DIAGNOSIS — Z71.6 TOBACCO ABUSE COUNSELING: ICD-10-CM

## 2021-11-16 DIAGNOSIS — F20.9 SCHIZOPHRENIA, CHRONIC CONDITION WITH ACUTE EXACERBATION (HCC): Primary | ICD-10-CM

## 2021-11-16 DIAGNOSIS — F17.210 CIGARETTE NICOTINE DEPENDENCE WITHOUT COMPLICATION: ICD-10-CM

## 2021-11-16 DIAGNOSIS — I10 PRIMARY HYPERTENSION: ICD-10-CM

## 2021-11-16 PROCEDURE — 99214 OFFICE O/P EST MOD 30 MIN: CPT | Performed by: NURSE PRACTITIONER

## 2021-11-16 RX ORDER — DOXEPIN HYDROCHLORIDE 75 MG/1
75 CAPSULE ORAL NIGHTLY
Qty: 30 CAPSULE | Refills: 1 | Status: SHIPPED | OUTPATIENT
Start: 2021-11-16

## 2021-11-16 NOTE — PROGRESS NOTES
[address] [facility]  [address] [address]  [address] [address]  Dept: [phone]  Dept Fax: [phone]: [phone]    Wesley Ward is a 41 y.o. female who presents today for her medical conditions/complaints as noted below. Wesley Ward is c/o of COPD and Schizophrenia      Chief Complaint   Patient presents with    COPD    Schizophrenia       HPI:     HPI   Patient returns today for 6 month follow up for COPD and Schizophrenia. No concerns to address today. Medication list is current and up to date. Pt is now on lisinopril since last hospital stay and wants to know if that is because pt has HTN. Thursday with [address] They have adjusted her meds from the hospital discharge last week. She has not been able get the meds from the pharmacy however. Past Medical History:   Diagnosis Date    Diabetes mellitus ([facility] [address]     Nicotine dependence     Schizophrenia ([facility] [address]         History reviewed. No pertinent surgical history.     Social History     Tobacco Use    Smoking status: Current Every Day Smoker     Packs/day: 4.00     Types: Cigarettes    Smokeless tobacco: Never Used   Substance Use Topics    Alcohol use: Not Currently     Comment: Rare        Current Outpatient Medications   Medication Sig Dispense Refill    doxepin (SINEQUAN) 75 MG capsule Take 1 capsule by mouth nightly 30 capsule 1    lisinopril (PRINIVIL;ZESTRIL) 10 MG tablet Take 1 tablet by mouth daily 30 tablet 1    paliperidone (INVEGA) 6 MG extended release tablet Take 2 tablets by mouth daily 60 tablet 1    QUEtiapine (SEROQUEL) 300 MG tablet Take 1 tablet by mouth nightly 30 tablet 1    albuterol sulfate  (90 Base) MCG/ACT inhaler Inhale 2 puffs into the lungs every 6 hours as needed for Wheezing 8.5 g 2    ferrous sulfate (IRON 325) 325 (65 Fe) MG tablet Take 1 tablet by mouth 2 times daily (with meals) 30 tablet 0    docusate sodium (COLACE) 100 MG capsule TAKE 1 CAPSULE BY MOUTH TWICE DAILY 60 capsule 11    metFORMIN (GLUCOPHAGE) 500 MG tablet TAKE 1 TABLET BY MOUTH TWICE DAILY WITH MEALS 60 tablet 5    vitamin D (ERGOCALCIFEROL) 1.25 MG (05646 UT) CAPS capsule TAKE 1 CAPSULE BY MOUTH 1 TIME A WEEK AFTER A MEAL 4 capsule 5    INCRUSE ELLIPTA 62.5 MCG/INH AEPB INHALE 1 PUFF EVERY DAY AS DIRECTED 30 each 0    tiotropium (SPIRIVA RESPIMAT) 2.5 MCG/ACT AERS inhaler Inhale 2 puffs into the lungs daily 1 Inhaler 2    loratadine (CLARITIN) 10 MG tablet TAKE 1 TABLET BY MOUTH DAILY 30 tablet 3    divalproex (DEPAKOTE ER) 500 MG extended release tablet Take 2 tablets by mouth daily (Patient not taking: Reported on 11/16/2021) 60 tablet 1     No current facility-administered medications for this visit. No Known Allergies    Family History   Problem Relation Age of Onset    Heart Disease Mother                Subjective:      Review of Systems   Constitutional: Negative. HENT: Negative. Eyes: Negative. Respiratory: Negative. Cardiovascular: Negative. Gastrointestinal: Negative. Endocrine: Negative. Genitourinary: Negative. Musculoskeletal: Negative. Skin: Negative. Neurological: Negative. Hematological: Negative. Psychiatric/Behavioral: Positive for agitation (stable) and decreased concentration. The patient is nervous/anxious. Objective:     Physical Exam  Vitals and nursing note reviewed. Constitutional:       Appearance: Normal appearance. She is well-developed. HENT:      Head: Normocephalic and atraumatic. Right Ear: Hearing, tympanic membrane, ear canal and external ear normal.      Left Ear: Hearing, tympanic membrane, ear canal and external ear normal.      Nose: Nose normal.      Mouth/Throat:      Pharynx: Uvula midline. Eyes:      General: Lids are normal. No scleral icterus. Conjunctiva/sclera: Conjunctivae normal.      Pupils: Pupils are equal, round, and reactive to light.    Neck:      Thyroid: No

## 2021-11-18 ASSESSMENT — ENCOUNTER SYMPTOMS
RESPIRATORY NEGATIVE: 1
EYES NEGATIVE: 1
GASTROINTESTINAL NEGATIVE: 1

## 2021-12-03 DIAGNOSIS — E55.9 VITAMIN D DEFICIENCY: ICD-10-CM

## 2021-12-05 RX ORDER — ERGOCALCIFEROL 1.25 MG/1
CAPSULE ORAL
Qty: 4 CAPSULE | Refills: 5 | Status: SHIPPED | OUTPATIENT
Start: 2021-12-05 | End: 2022-02-08 | Stop reason: SDUPTHER

## 2021-12-07 ENCOUNTER — OFFICE VISIT (OUTPATIENT)
Dept: PRIMARY CARE CLINIC | Age: 46
End: 2021-12-07
Payer: MEDICAID

## 2021-12-07 VITALS
HEIGHT: 59 IN | WEIGHT: 198 LBS | TEMPERATURE: 98.5 F | BODY MASS INDEX: 39.92 KG/M2 | OXYGEN SATURATION: 97 % | HEART RATE: 104 BPM | DIASTOLIC BLOOD PRESSURE: 72 MMHG | SYSTOLIC BLOOD PRESSURE: 135 MMHG

## 2021-12-07 DIAGNOSIS — Z71.6 TOBACCO ABUSE COUNSELING: ICD-10-CM

## 2021-12-07 DIAGNOSIS — F17.210 CIGARETTE NICOTINE DEPENDENCE WITHOUT COMPLICATION: ICD-10-CM

## 2021-12-07 DIAGNOSIS — F20.9 SCHIZOPHRENIA, CHRONIC CONDITION WITH ACUTE EXACERBATION (HCC): ICD-10-CM

## 2021-12-07 DIAGNOSIS — I10 PRIMARY HYPERTENSION: Primary | ICD-10-CM

## 2021-12-07 PROCEDURE — 99406 BEHAV CHNG SMOKING 3-10 MIN: CPT | Performed by: NURSE PRACTITIONER

## 2021-12-07 PROCEDURE — 99213 OFFICE O/P EST LOW 20 MIN: CPT | Performed by: NURSE PRACTITIONER

## 2021-12-07 PROCEDURE — 91300 COVID-19, PFIZER VACCINE 30MCG/0.3ML DOSE: CPT | Performed by: NURSE PRACTITIONER

## 2021-12-07 PROCEDURE — 0004A COVID-19, PFIZER VACCINE 30MCG/0.3ML DOSE: CPT | Performed by: NURSE PRACTITIONER

## 2021-12-07 RX ORDER — THIAMINE MONONITRATE (VIT B1) 100 MG
100 TABLET ORAL DAILY
COMMUNITY

## 2021-12-07 ASSESSMENT — ENCOUNTER SYMPTOMS
EYES NEGATIVE: 1
GASTROINTESTINAL NEGATIVE: 1
RESPIRATORY NEGATIVE: 1

## 2021-12-07 NOTE — PROGRESS NOTES
[address] [facility]  [address] [address] [place] [address]  Dept: [phone]  Dept Fax: [phone]: [phone]    Nelson Earl is a 41 y.o. female who presents today for her medical conditions/complaints as noted below. Nelson Earl is c/o of Hypertension and Schizophrenia      Chief Complaint   Patient presents with    Hypertension    Schizophrenia       HPI:     HPI   Pt is here for a follow up on htn and schziphernia. Pt states no complaints. She has been taking her meds as prescribed. She was not able to  the Doxepin until today. She is still smoking 2 PPD. She denies headaches. Has not been monitoring BP at home. Past Medical History:   Diagnosis Date    Diabetes mellitus ([facility]     Nicotine dependence     Schizophrenia ([facility]         No past surgical history on file.     Social History     Tobacco Use    Smoking status: Current Every Day Smoker     Packs/day: 4.00     Types: Cigarettes    Smokeless tobacco: Never Used   Substance Use Topics    Alcohol use: Not Currently     Comment: Rare        Current Outpatient Medications   Medication Sig Dispense Refill    vitamin B-1 (THIAMINE) 100 MG tablet Take 100 mg by mouth daily      vitamin D (ERGOCALCIFEROL) 1.25 MG (14383 UT) CAPS capsule TAKE 1 CAPSULE BY MOUTH 1 TIME A WEEK AFTER A MEAL 4 capsule 5    doxepin (SINEQUAN) 75 MG capsule Take 1 capsule by mouth nightly 30 capsule 1    lisinopril (PRINIVIL;ZESTRIL) 10 MG tablet Take 1 tablet by mouth daily 30 tablet 1    paliperidone (INVEGA) 6 MG extended release tablet Take 2 tablets by mouth daily 60 tablet 1    QUEtiapine (SEROQUEL) 300 MG tablet Take 1 tablet by mouth nightly 30 tablet 1    albuterol sulfate  (90 Base) MCG/ACT inhaler Inhale 2 puffs into the lungs every 6 hours as needed for Wheezing 8.5 g 2    ferrous sulfate (IRON 325) 325 (65 Fe) MG tablet Take 1 tablet by mouth 2 times daily (with meals) 30 tablet 0    docusate sodium (COLACE) 100 MG capsule TAKE 1 CAPSULE BY MOUTH TWICE DAILY 60 capsule 11    metFORMIN (GLUCOPHAGE) 500 MG tablet TAKE 1 TABLET BY MOUTH TWICE DAILY WITH MEALS 60 tablet 5    INCRUSE ELLIPTA 62.5 MCG/INH AEPB INHALE 1 PUFF EVERY DAY AS DIRECTED 30 each 0    tiotropium (SPIRIVA RESPIMAT) 2.5 MCG/ACT AERS inhaler Inhale 2 puffs into the lungs daily 1 Inhaler 2    loratadine (CLARITIN) 10 MG tablet TAKE 1 TABLET BY MOUTH DAILY 30 tablet 3     No current facility-administered medications for this visit. No Known Allergies    Family History   Problem Relation Age of Onset    Heart Disease Mother                Subjective:      Review of Systems   Constitutional: Negative. HENT: Negative. Eyes: Negative. Respiratory: Negative. Cardiovascular: Negative. Gastrointestinal: Negative. Endocrine: Negative. Genitourinary: Negative. Musculoskeletal: Negative. Skin: Negative. Neurological: Negative. Hematological: Negative. Psychiatric/Behavioral: Positive for sleep disturbance. The patient is nervous/anxious. Objective:     Physical Exam  Vitals and nursing note reviewed. Constitutional:       Appearance: Normal appearance. She is well-developed. HENT:      Head: Normocephalic and atraumatic. Right Ear: Hearing, tympanic membrane, ear canal and external ear normal.      Left Ear: Hearing, tympanic membrane, ear canal and external ear normal.      Nose: Nose normal.      Mouth/Throat:      Pharynx: Uvula midline. Eyes:      General: Lids are normal. No scleral icterus. Conjunctiva/sclera: Conjunctivae normal.      Pupils: Pupils are equal, round, and reactive to light. Neck:      Thyroid: No thyroid mass or thyromegaly. Trachea: Trachea normal.   Cardiovascular:      Rate and Rhythm: Normal rate and regular rhythm. Pulses: Normal pulses. Heart sounds: Normal heart sounds.    Pulmonary:      Effort: Pulmonary effort is normal.      Breath sounds: Wheezing present. No decreased breath sounds, rhonchi or rales. Abdominal:      General: Bowel sounds are normal.      Palpations: Abdomen is soft. Musculoskeletal:         General: Normal range of motion. Cervical back: Normal range of motion and neck supple. No tenderness. Thoracic back: Normal. No tenderness. Normal range of motion. Lumbar back: Normal. No tenderness. Normal range of motion. Skin:     General: Skin is warm and dry. Neurological:      Mental Status: She is alert and oriented to person, place, and time. Psychiatric:         Mood and Affect: Mood is not anxious or depressed. Affect is flat. Speech: Speech is delayed. Behavior: Behavior is slowed and withdrawn. Thought Content: Thought content normal.         Judgment: Judgment normal.         /72 (Site: Left Upper Arm, Position: Sitting)   Pulse 104   Temp 98.5 °F (36.9 °C)   Ht 4' 11\" (1.499 m)   Wt 198 lb (89.8 kg)   SpO2 97%   BMI 39.99 kg/m²     Assessment:      Diagnosis Orders   1. Primary hypertension     2. Schizophrenia, chronic condition with acute exacerbation ([facility]     3. Cigarette nicotine dependence without complication     4. Tobacco abuse counseling         No results found for this visit on 12/07/21. Plan: Will continue current regimen. Approximately 5 minutes of education was provided about quit smoking and the harms of tobacco.  Patient does show understanding. Patient does not have  the desire to quit smoking in the future. More than 50% of the time was spent counseling and coordinating care for a total time of 23 mins face to face. Return in about 2 months (around 2/7/2022) for Follow up chronic conditions. Orders Placed This Encounter   Procedures    COVID-19, Pfizer Vaccine 30MCG/0.3mL Dose       No orders of the defined types were placed in this encounter.            Patient offered educational handouts and has had all questions answered. Patient voices understanding and agrees to plans along with risks and benefits of plan. Patient is instructed to continue prior meds, diet, and exercise plans as instructed. Patient agrees to follow up as instructed and sooner if needed. Patient agrees to go to ER if condition becomes emergent. EMR Dragon/transcription disclaimer: Some of this encounter note is an electronic transcription/translation of spoken language to printed text. The electronic translation of spoken language may permit erroneous, or at times, nonsensical words or phrases to be inadvertently transcribed.  Although I have reviewed the note for such errors, some may still exist.    Electronically signed by BARBARA Germain on 12/7/2021 at 3:21 PM

## 2021-12-31 DIAGNOSIS — J44.9 STAGE 2 MODERATE COPD BY GOLD CLASSIFICATION (HCC): ICD-10-CM

## 2022-02-08 ENCOUNTER — OFFICE VISIT (OUTPATIENT)
Dept: PRIMARY CARE CLINIC | Age: 47
End: 2022-02-08
Payer: MEDICAID

## 2022-02-08 VITALS
DIASTOLIC BLOOD PRESSURE: 68 MMHG | SYSTOLIC BLOOD PRESSURE: 116 MMHG | WEIGHT: 208 LBS | OXYGEN SATURATION: 96 % | TEMPERATURE: 97.2 F | HEIGHT: 59 IN | BODY MASS INDEX: 41.93 KG/M2 | HEART RATE: 105 BPM

## 2022-02-08 DIAGNOSIS — E55.9 VITAMIN D DEFICIENCY: ICD-10-CM

## 2022-02-08 DIAGNOSIS — J44.1 COPD EXACERBATION (HCC): Primary | ICD-10-CM

## 2022-02-08 PROCEDURE — 99213 OFFICE O/P EST LOW 20 MIN: CPT | Performed by: NURSE PRACTITIONER

## 2022-02-08 RX ORDER — DEXAMETHASONE SODIUM PHOSPHATE 4 MG/ML
4 INJECTION, SOLUTION INTRA-ARTICULAR; INTRALESIONAL; INTRAMUSCULAR; INTRAVENOUS; SOFT TISSUE ONCE
Status: COMPLETED | OUTPATIENT
Start: 2022-02-08 | End: 2022-02-08

## 2022-02-08 RX ORDER — ERGOCALCIFEROL 1.25 MG/1
CAPSULE ORAL
Qty: 4 CAPSULE | Refills: 5 | Status: SHIPPED | OUTPATIENT
Start: 2022-02-08

## 2022-02-08 RX ORDER — LISINOPRIL 10 MG/1
10 TABLET ORAL DAILY
Qty: 30 TABLET | Refills: 1 | Status: SHIPPED | OUTPATIENT
Start: 2022-02-08 | End: 2022-04-19

## 2022-02-08 RX ORDER — AZITHROMYCIN 250 MG/1
250 TABLET, FILM COATED ORAL SEE ADMIN INSTRUCTIONS
Qty: 6 TABLET | Refills: 0 | Status: SHIPPED | OUTPATIENT
Start: 2022-02-08 | End: 2022-02-13

## 2022-02-08 RX ORDER — IPRATROPIUM BROMIDE AND ALBUTEROL SULFATE 2.5; .5 MG/3ML; MG/3ML
1 SOLUTION RESPIRATORY (INHALATION) EVERY 4 HOURS
Qty: 360 ML | Refills: 0 | Status: SHIPPED | OUTPATIENT
Start: 2022-02-08

## 2022-02-08 RX ADMIN — DEXAMETHASONE SODIUM PHOSPHATE 4 MG: 4 INJECTION, SOLUTION INTRA-ARTICULAR; INTRALESIONAL; INTRAMUSCULAR; INTRAVENOUS; SOFT TISSUE at 14:55

## 2022-02-08 ASSESSMENT — PATIENT HEALTH QUESTIONNAIRE - PHQ9
SUM OF ALL RESPONSES TO PHQ QUESTIONS 1-9: 2
SUM OF ALL RESPONSES TO PHQ9 QUESTIONS 1 & 2: 2
SUM OF ALL RESPONSES TO PHQ QUESTIONS 1-9: 2
2. FEELING DOWN, DEPRESSED OR HOPELESS: 1
1. LITTLE INTEREST OR PLEASURE IN DOING THINGS: 1

## 2022-02-08 ASSESSMENT — ENCOUNTER SYMPTOMS
GASTROINTESTINAL NEGATIVE: 1
COUGH: 1
WHEEZING: 1
SHORTNESS OF BREATH: 1
EYES NEGATIVE: 1

## 2022-02-08 NOTE — PROGRESS NOTES
[facility]  [address] [address]  [address] [place] [address]  Dept: [phone]  Dept Fax: [phone]: [phone]    Shannan Hall is a 40 y.o. female who presents today for her medical conditions/complaints as noted below. Shannan Hall is c/o of Cough (pt states she has had this for over a week and is still a smoker. ) and Congestion (pt states she has chest congestion )      Chief Complaint   Patient presents with    Cough     pt states she has had this for over a week and is still a smoker.  Congestion     pt states she has chest congestion        HPI:     HPI  Patient is here for COPD exacerbation. She has developed a cough and congestion that has been worsening over the last few days. Patient does continue to smoke greater than 3-4 PPD. She has been using her inhalers at home, but they are not helping. Past Medical History:   Diagnosis Date    Diabetes mellitus ([facility]     Nicotine dependence     [facility]         History reviewed. No pertinent surgical history.     Social History     Tobacco Use    Smoking status: Current Every Day Smoker     Packs/day: 4.00     Types: Cigarettes    Smokeless tobacco: Never Used   Substance Use Topics    Alcohol use: Not Currently     Comment: Rare        Current Outpatient Medications   Medication Sig Dispense Refill    vitamin D (ERGOCALCIFEROL) 1.25 MG (02935 UT) CAPS capsule TAKE 1 CAPSULE BY MOUTH 1 TIME A WEEK AFTER A MEAL 4 capsule 5    lisinopril (PRINIVIL;ZESTRIL) 10 MG tablet Take 1 tablet by mouth daily 30 tablet 1    ipratropium-albuterol (DUONEB) 0.5-2.5 (3) MG/3ML SOLN nebulizer solution Inhale 3 mLs into the lungs every 4 hours 360 mL 0    azithromycin (ZITHROMAX) 250 MG tablet Take 1 tablet by mouth See Admin Instructions for 5 days 500mg on day 1 followed by 250mg on days 2 - 5 6 tablet 0    PROAIR  (90 Base) MCG/ACT inhaler INHALE 2 PUFFS INTO THE LUNGS EVERY 6 HOURS AS NEEDED FOR WHEEZING 8.5 g 2    vitamin B-1 (THIAMINE) 100 MG tablet Take 100 mg by mouth daily      doxepin (SINEQUAN) 75 MG capsule Take 1 capsule by mouth nightly 30 capsule 1    paliperidone (INVEGA) 6 MG extended release tablet Take 2 tablets by mouth daily 60 tablet 1    QUEtiapine (SEROQUEL) 300 MG tablet Take 1 tablet by mouth nightly 30 tablet 1    ferrous sulfate (IRON 325) 325 (65 Fe) MG tablet Take 1 tablet by mouth 2 times daily (with meals) 30 tablet 0    docusate sodium (COLACE) 100 MG capsule TAKE 1 CAPSULE BY MOUTH TWICE DAILY 60 capsule 11    metFORMIN (GLUCOPHAGE) 500 MG tablet TAKE 1 TABLET BY MOUTH TWICE DAILY WITH MEALS 60 tablet 5    INCRUSE ELLIPTA 62.5 MCG/INH AEPB INHALE 1 PUFF EVERY DAY AS DIRECTED 30 each 0    tiotropium (SPIRIVA RESPIMAT) 2.5 MCG/ACT AERS inhaler Inhale 2 puffs into the lungs daily 1 Inhaler 2    loratadine (CLARITIN) 10 MG tablet TAKE 1 TABLET BY MOUTH DAILY 30 tablet 3     Current Facility-Administered Medications   Medication Dose Route Frequency Provider Last Rate Last Admin    dexamethasone (DECADRON) injection 4 mg  4 mg IntraMUSCular Once Merryl Precise, APRN           No Known Allergies    Family History   Problem Relation Age of Onset    Heart Disease Mother                Subjective:      Review of Systems   Constitutional: Negative. Negative for fever. HENT: Positive for congestion. Eyes: Negative. Respiratory: Positive for cough, shortness of breath and wheezing. Cardiovascular: Negative. Gastrointestinal: Negative. Endocrine: Negative. Genitourinary: Negative. Musculoskeletal: Negative. Skin: Negative. Neurological: Negative. Hematological: Negative. Psychiatric/Behavioral: Negative. Objective:     Physical Exam  Vitals and nursing note reviewed. Constitutional:       Appearance: Normal appearance. She is well-developed. HENT:      Head: Normocephalic and atraumatic.       Right Ear: Hearing, tympanic membrane, ear canal and external ear normal.      Left Ear: Hearing, tympanic membrane, ear canal and external ear normal.      Nose: Nose normal.      Mouth/Throat:      Pharynx: Uvula midline. Eyes:      General: Lids are normal. No scleral icterus. Conjunctiva/sclera: Conjunctivae normal.      Pupils: Pupils are equal, round, and reactive to light. Neck:      Thyroid: No thyroid mass or thyromegaly. Trachea: Trachea normal.   Cardiovascular:      Rate and Rhythm: Normal rate and regular rhythm. Pulses: Normal pulses. Heart sounds: Normal heart sounds. Pulmonary:      Effort: Pulmonary effort is normal.      Breath sounds: Wheezing and rhonchi present. No decreased breath sounds or rales. Abdominal:      General: Bowel sounds are normal.      Palpations: Abdomen is soft. Musculoskeletal:         General: Normal range of motion. Cervical back: Normal range of motion and neck supple. No tenderness. Thoracic back: Normal. No tenderness. Normal range of motion. Lumbar back: Normal. No tenderness. Normal range of motion. Skin:     General: Skin is warm and dry. Neurological:      Mental Status: She is alert and oriented to person, place, and time. Psychiatric:         Mood and Affect: Mood is not anxious or depressed. Affect is flat. Speech: Speech is delayed. Behavior: Behavior is slowed and withdrawn. Thought Content: Thought content normal.         Judgment: Judgment normal.         /68 (Site: Left Upper Arm, Position: Sitting)   Pulse 105   Temp 97.2 °F (36.2 °C) (Temporal)   Ht 4' 11\" (1.499 m)   Wt 208 lb (94.3 kg)   SpO2 96%   BMI 42.01 kg/m²     Assessment:      Diagnosis Orders   1. COPD exacerbation (HCC)  azithromycin (ZITHROMAX) 250 MG tablet   2. Vitamin D deficiency  vitamin D (ERGOCALCIFEROL) 1.25 MG (12148 UT) CAPS capsule       No results found for this visit on 02/08/22.     Plan:     Steroid injection in office. Patient to use Duoneb treatments at home. [facility] machine sent home with patient. Oral abx to start today. Not willing to start oral steroids due to side effects. More than 50% of the time was spent counseling and coordinating care for a total time of 23 mins face to face. Return if symptoms worsen or fail to improve. No orders of the defined types were placed in this encounter. Orders Placed This Encounter   Medications    vitamin D (ERGOCALCIFEROL) 1.25 MG (00016 UT) CAPS capsule     Sig: TAKE 1 CAPSULE BY MOUTH 1 TIME A WEEK AFTER A MEAL     Dispense:  4 capsule     Refill:  5    lisinopril (PRINIVIL;ZESTRIL) 10 MG tablet     Sig: Take 1 tablet by mouth daily     Dispense:  30 tablet     Refill:  1    ipratropium-albuterol (DUONEB) 0.5-2.5 (3) MG/3ML SOLN nebulizer solution     Sig: Inhale 3 mLs into the lungs every 4 hours     Dispense:  360 mL     Refill:  0    dexamethasone (DECADRON) injection 4 mg    azithromycin (ZITHROMAX) 250 MG tablet     Sig: Take 1 tablet by mouth See Admin Instructions for 5 days 500mg on day 1 followed by 250mg on days 2 - 5     Dispense:  6 tablet     Refill:  0            Patient offered educational handouts and has had all questions answered. Patient voices understanding and agrees to plans along with risks and benefits of plan. Patient is instructed to continue prior meds, diet, and exercise plans as instructed. Patient agrees to follow up as instructed and sooner if needed. Patient agrees to go to ER if condition becomes emergent. EMR Dragon/transcription disclaimer: Some of this encounter note is an electronic transcription/translation of spoken language to printed text. The electronic translation of spoken language may permit erroneous, or at times, nonsensical words or phrases to be inadvertently transcribed.  Although I have reviewed the note for such errors, some may still exist.    Electronically signed by BARBARA Bright on 2/8/2022 at 2:45 PM               .

## 2022-02-17 NOTE — TELEPHONE ENCOUNTER
Jorge Peña called to request a refill on her medication.       Last office visit : 2/8/2022   Next office visit : Visit date not found     Requested Prescriptions     Pending Prescriptions Disp Refills    metFORMIN (GLUCOPHAGE) 500 MG tablet [Pharmacy Med Name: METFORMIN 500MG TABLETS] 60 tablet 5     Sig: TAKE 1 TABLET BY MOUTH TWICE DAILY WITH MEALS            Donald Lopes MA

## 2022-03-06 ENCOUNTER — HOSPITAL ENCOUNTER (EMERGENCY)
Age: 47
Discharge: HOME | End: 2022-03-06
Payer: MEDICAID

## 2022-03-06 VITALS
SYSTOLIC BLOOD PRESSURE: 158 MMHG | DIASTOLIC BLOOD PRESSURE: 95 MMHG | HEIGHT: 59 IN | HEART RATE: 90 BPM | OXYGEN SATURATION: 96 % | WEIGHT: 208 LBS | TEMPERATURE: 98.6 F | BODY MASS INDEX: 41.93 KG/M2 | RESPIRATION RATE: 18 BRPM

## 2022-03-06 DIAGNOSIS — F69 ADULT BEHAVIOR PROBLEM: Primary | ICD-10-CM

## 2022-03-06 DIAGNOSIS — F17.200 SMOKING ADDICTION: ICD-10-CM

## 2022-03-06 DIAGNOSIS — F20.89 OTHER SCHIZOPHRENIA (HCC): ICD-10-CM

## 2022-03-06 DIAGNOSIS — E11.9 TYPE 2 DIABETES MELLITUS WITHOUT COMPLICATION, WITHOUT LONG-TERM CURRENT USE OF INSULIN (HCC): ICD-10-CM

## 2022-03-06 LAB
ACETAMINOPHEN LEVEL: <15 UG/ML
ALBUMIN SERPL-MCNC: 3.9 G/DL (ref 3.5–5.2)
ALP BLD-CCNC: 98 U/L (ref 35–104)
ALT SERPL-CCNC: 14 U/L (ref 5–33)
AMPHETAMINE SCREEN, URINE: NEGATIVE
ANION GAP SERPL CALCULATED.3IONS-SCNC: 12 MMOL/L (ref 7–19)
AST SERPL-CCNC: 15 U/L (ref 5–32)
BARBITURATE SCREEN URINE: NEGATIVE
BASOPHILS ABSOLUTE: 0 K/UL (ref 0–0.2)
BASOPHILS RELATIVE PERCENT: 0.3 % (ref 0–1)
BENZODIAZEPINE SCREEN, URINE: NEGATIVE
BILIRUB SERPL-MCNC: <0.2 MG/DL (ref 0.2–1.2)
BUN BLDV-MCNC: 5 MG/DL (ref 6–20)
CALCIUM SERPL-MCNC: 9.4 MG/DL (ref 8.6–10)
CANNABINOID SCREEN URINE: NEGATIVE
CHLORIDE BLD-SCNC: 99 MMOL/L (ref 98–111)
CO2: 25 MMOL/L (ref 22–29)
COCAINE METABOLITE SCREEN URINE: NEGATIVE
CREAT SERPL-MCNC: 0.6 MG/DL (ref 0.5–0.9)
EOSINOPHILS ABSOLUTE: 0.1 K/UL (ref 0–0.6)
EOSINOPHILS RELATIVE PERCENT: 0.8 % (ref 0–5)
ETHANOL: <10 MG/DL (ref 0–0.08)
GFR AFRICAN AMERICAN: >59
GFR NON-AFRICAN AMERICAN: >60
GLUCOSE BLD-MCNC: 244 MG/DL (ref 70–99)
GLUCOSE BLD-MCNC: 317 MG/DL (ref 74–109)
HBA1C MFR BLD: 7.8 % (ref 4–6)
HCG(URINE) PREGNANCY TEST: NEGATIVE
HCT VFR BLD CALC: 40 % (ref 37–47)
HEMOGLOBIN: 12.5 G/DL (ref 12–16)
IMMATURE GRANULOCYTES #: 0.1 K/UL
LYMPHOCYTES ABSOLUTE: 2.3 K/UL (ref 1.1–4.5)
LYMPHOCYTES RELATIVE PERCENT: 23.4 % (ref 20–40)
Lab: NORMAL
MCH RBC QN AUTO: 26.5 PG (ref 27–31)
MCHC RBC AUTO-ENTMCNC: 31.3 G/DL (ref 33–37)
MCV RBC AUTO: 84.7 FL (ref 81–99)
MONOCYTES ABSOLUTE: 0.8 K/UL (ref 0–0.9)
MONOCYTES RELATIVE PERCENT: 8.5 % (ref 0–10)
NEUTROPHILS ABSOLUTE: 6.4 K/UL (ref 1.5–7.5)
NEUTROPHILS RELATIVE PERCENT: 66.3 % (ref 50–65)
OPIATE SCREEN URINE: NEGATIVE
PDW BLD-RTO: 20.1 % (ref 11.5–14.5)
PERFORMED ON: ABNORMAL
PLATELET # BLD: 326 K/UL (ref 130–400)
PMV BLD AUTO: 10.2 FL (ref 9.4–12.3)
POTASSIUM REFLEX MAGNESIUM: 4.4 MMOL/L (ref 3.5–5)
RBC # BLD: 4.72 M/UL (ref 4.2–5.4)
SALICYLATE, SERUM: <3 MG/DL (ref 3–10)
SARS-COV-2, NAAT: NOT DETECTED
SODIUM BLD-SCNC: 136 MMOL/L (ref 136–145)
TOTAL PROTEIN: 7.3 G/DL (ref 6.6–8.7)
WBC # BLD: 9.6 K/UL (ref 4.8–10.8)

## 2022-03-06 PROCEDURE — 82947 ASSAY GLUCOSE BLOOD QUANT: CPT

## 2022-03-06 PROCEDURE — 82077 ASSAY SPEC XCP UR&BREATH IA: CPT

## 2022-03-06 PROCEDURE — 83036 HEMOGLOBIN GLYCOSYLATED A1C: CPT

## 2022-03-06 PROCEDURE — 80179 DRUG ASSAY SALICYLATE: CPT

## 2022-03-06 PROCEDURE — 84703 CHORIONIC GONADOTROPIN ASSAY: CPT

## 2022-03-06 PROCEDURE — 99284 EMERGENCY DEPT VISIT MOD MDM: CPT

## 2022-03-06 PROCEDURE — 80307 DRUG TEST PRSMV CHEM ANLYZR: CPT

## 2022-03-06 PROCEDURE — 87635 SARS-COV-2 COVID-19 AMP PRB: CPT

## 2022-03-06 PROCEDURE — 36415 COLL VENOUS BLD VENIPUNCTURE: CPT

## 2022-03-06 PROCEDURE — 6370000000 HC RX 637 (ALT 250 FOR IP): Performed by: NURSE PRACTITIONER

## 2022-03-06 PROCEDURE — 80143 DRUG ASSAY ACETAMINOPHEN: CPT

## 2022-03-06 PROCEDURE — 80053 COMPREHEN METABOLIC PANEL: CPT

## 2022-03-06 PROCEDURE — 85025 COMPLETE CBC W/AUTO DIFF WBC: CPT

## 2022-03-06 RX ADMIN — METFORMIN HYDROCHLORIDE 500 MG: 500 TABLET ORAL at 19:24

## 2022-03-06 RX ADMIN — DOXEPIN HYDROCHLORIDE 75 MG: 25 CAPSULE ORAL at 20:06

## 2022-03-06 NOTE — ED TRIAGE NOTES
Pt states she is here due to extreme anger because her sister wont let her smoke more than 4 packs a day.  When asked if SI she said no but when asked if HI she said yes towards \"Facundo Hackett\"

## 2022-03-06 NOTE — PROGRESS NOTES
[facility] ADULT INITIAL INTAKE ASSESSMENT     3/6/22    Cherice Denver Quivers ,a 40 y.o. female, presents to the ED for a psychiatric assessment. ED Arrival time: Allen Freedman  ED physician: Benita Up  [facility] Notification time: in ED  [facility] start time: 1800  Psychiatrist call time: [phone] with Dr. To Moses    Patient is referred by: Sister    Reason for visit to ED - Presenting problem:     PT states reason for ED visit, \"I'm hearing voices, and I got mad because I needed cigarettes and couldn't get any. \"    Patient seen in ED exam room 15 lying on bed. Patient dressed in paper scrubs, has sister at bedside and one-to-one sitter at bedside. She reports history of schizophrenia, reports chronic auditory hallucinations, reports are not commanding, but does endorse are current and that she's \"hearing voices\". She reports medication compliance with her current regimen. Is current with [facility] and next appointment is 3/16/22. She denies suicidal ideations and homicidal ideations. She reports history of one suicide attempt about 10-11 years ago by cutting her wrists. Has had multiple psychiatric hospitalizations in the past at [facility], last in 11/2021. She denies alcohol and substance abuse, is a heavy smoker, smoking 3-4 packs per day.      Duration of symptoms: today    Current Stressors: family    C-SSRS Completed: yes    SI:  denies   Plan: no   Past SI attempts: yes   If yes, when and how many times: one attempt about 10-11 years ago by cutting wrist  Describe suicide attempts:   HI: denies  If yes describe:   Delusions: denies   If yes describe:   Hallucinations: has chronic hallucinations  If yes describe: \"I hear voices all the time\", are not commanding  Risk of Harm to self: Self injurious/self mutilation behaviorsno   If yes explain:   Was it within the past 6 months: no   Risk of Harm to others: no   If yes explain:   Was it within the past 6 months: no   Trauma History:   Anxiety 1-10:  0  Explain if increased: Depression 1-10:  8  Explain if increased:   Level of function outside hospital decreased: no   If yes explain: n/a      Psychiatric Hospitalizations: Yes   Where & When: multiple to [facility], last in November 2021  Outpatient Psychiatric Treatment: [facility], next appointment is 3/16/22    Family History:    Family history of mental illness: yes multiple members with schizophrenia   \"Depression\",\"Anxiety\",\"Bipolar\",\"Schizophrenia\",\"Borderline\",\"ADHD\"}  Family members with suicide attempt: no   If yes explain (attempted or completed):    Substance Abuse History:     SBIRT Completed: yes  Brief Intervention completed if needed:  (Yes/No)    Current ETOH LEVELS: < 10    ETOH Usage:     Amount drinking daily: denied    Date of last drink:   Longest period of sobriety:    Substance/Chemical Abuse/Recreational Drug History:  Substance used: denies  Date of last substance use: denies  Tobacco Use:  Yes heavy, smoking 3-4 packs per day   History of rehab treatment: denies  How many times in rehab:  Last time in rehab:  Family history of substance abuse:    Opiates: It was discussed with pt they would not be receiving opiates unless they were within 3 days post surgery/acute injury. Patient voiced understanding and agreed. Psychiatric Review Of Systems:     Recent Sleep changes: yes   Recent appetite changes: no   Recent weight changes/Pounds gained (+) or lost (-): no      Medical History:     Medical Diagnosis/Issues: Schizophrenia, DM, Nicotine dependence  CT today in ED:no  Use of 02 or CPAP: no  Ambulatory: yes  Independent or Need assistance with Self Care: yes    PCP: BARBARA Downey     Current Medications:   Scheduled Meds: No current facility-administered medications for this encounter.     Current Outpatient Medications:     metFORMIN (GLUCOPHAGE) 500 MG tablet, TAKE 1 TABLET BY MOUTH TWICE DAILY WITH MEALS, Disp: 60 tablet, Rfl: 5    vitamin D (ERGOCALCIFEROL) 1.25 MG (03464 UT) CAPS capsule, TAKE 1 CAPSULE BY MOUTH 1 TIME A WEEK AFTER A MEAL, Disp: 4 capsule, Rfl: 5    lisinopril (PRINIVIL;ZESTRIL) 10 MG tablet, Take 1 tablet by mouth daily, Disp: 30 tablet, Rfl: 1    ipratropium-albuterol (DUONEB) 0.5-2.5 (3) MG/3ML SOLN nebulizer solution, Inhale 3 mLs into the lungs every 4 hours, Disp: 360 mL, Rfl: 0    PROAIR  (90 Base) MCG/ACT inhaler, INHALE 2 PUFFS INTO THE LUNGS EVERY 6 HOURS AS NEEDED FOR WHEEZING, Disp: 8.5 g, Rfl: 2    vitamin B-1 (THIAMINE) 100 MG tablet, Take 100 mg by mouth daily, Disp: , Rfl:     doxepin (SINEQUAN) 75 MG capsule, Take 1 capsule by mouth nightly, Disp: 30 capsule, Rfl: 1    paliperidone (INVEGA) 6 MG extended release tablet, Take 2 tablets by mouth daily, Disp: 60 tablet, Rfl: 1    QUEtiapine (SEROQUEL) 300 MG tablet, Take 1 tablet by mouth nightly, Disp: 30 tablet, Rfl: 1    ferrous sulfate (IRON 325) 325 (65 Fe) MG tablet, Take 1 tablet by mouth 2 times daily (with meals), Disp: 30 tablet, Rfl: 0    docusate sodium (COLACE) 100 MG capsule, TAKE 1 CAPSULE BY MOUTH TWICE DAILY, Disp: 60 capsule, Rfl: 11    INCRUSE ELLIPTA 62.5 MCG/INH AEPB, INHALE 1 PUFF EVERY DAY AS DIRECTED, Disp: 30 each, Rfl: 0    tiotropium (SPIRIVA RESPIMAT) 2.5 MCG/ACT AERS inhaler, Inhale 2 puffs into the lungs daily, Disp: 1 Inhaler, Rfl: 2    loratadine (CLARITIN) 10 MG tablet, TAKE 1 TABLET BY MOUTH DAILY, Disp: 30 tablet, Rfl: 3     Mental Status Evaluation:     Appearance:  overweight   Behavior:  Within Normal Limits   Speech:  normal pitch and normal volume   Mood:  depressed   Affect:  Within normal limits   Thought Process:  circumstantial   Thought Content:  hallucinations   Sensorium:  person, place, time/date and situation   Cognition:  grossly intact   Insight:  age appropriate       Collateral Information:     Name: Jane Thornton  Relationship: Sister  Phone Number: [phone]  Collateral: She got mad today because she wanted more cigarettes and we wouldn't go get them for her. She was yelling, acting out and knocked a picture off the wall. She reports that patient currently lives with her mother, her sister and they keep some of her sister's great nieces and nephews. She reports patient has been compliant with her medications, as been out of her sleeping medication, doxepin for a couple days but just waiting on refill in the mail. Her sister takes care of her medications. She reports her sister is smoking about 3-4 packs per day. She has COPD, is on breathing treatments for this at home. Current living arrangement:Lives with mother, older sister and sister has custody of some of her great nieces and nephews. Current Support System:  Employment:    Disposition:     Choose one of the options below for disposition:     1. Decision to admit to :no      Does the patient have a guardian or Medical POA: no  Has the guardian been notified or Medical POA:       2. Decision to Discharge:   Does not meet criteria for acceptance to   unit due to: Denies SI, HI, has hallucinations but are chronic and not commanding. Patient is medication compliant. Recommend following up with [facility] for behavioral issues and PCP for smoking cessation. Safety plan completed and patient given copy with discharge paperwork.     3. Transferred:       Patient was transferred due to: n/a    Other follow up information provided:      Roni Breaux RN

## 2022-03-07 NOTE — ED PROVIDER NOTES
[facility] EMERGENCY DEPT  eMERGENCY dEPARTMENT eNCOUnter      Pt Name: Selina Landaverde  MRN: [id number]  [place] 1975  Date of evaluation: 3/6/2022  Provider: BARBARA Pittman    CHIEF COMPLAINT       Chief Complaint   Patient presents with   [address] Problem     anger issues per sister         HISTORY OF PRESENT ILLNESS   (Location/Symptom, Timing/Onset,Context/Setting, Quality, Duration, Modifying Factors, Severity)  Note limiting factors. Selina Landaverde is a 40 y.o. female who presents to the emergency department with her sister today. Pt lives with her sister and she says she was more angry than usual.  The patient was demanding cigarettes. She was being verbally aggressive with her family members. The patient has schizophrenia and has been taking her medications as prescribed. No alcohol or drug use. The sister points out that she is wheezing from smoking 2 to 3 packs a day. Patient denies SI or HI currently. She does have the hallucinations which have been stable. The history is provided by the patient and a caregiver. Mental Health Problem  Presenting symptoms: aggressive behavior and hallucinations    Patient accompanied by:  Family member  Degree of incapacity (severity): Moderate  Onset quality:  Sudden  Duration:  1 day  Timing:  Constant  Context: not alcohol use, not drug abuse, not noncompliant and not recent medication change    Risk factors: hx of mental illness        NursingNotes were reviewed. REVIEW OF SYSTEMS    (2-9 systems for level 4, 10 or more for level 5)     Review of Systems   Psychiatric/Behavioral: Positive for behavioral problems and hallucinations. Except as noted above the remainder of the review of systems was reviewed and negative. PAST MEDICAL HISTORY     Past Medical History:   Diagnosis Date    [facility] mellitus ([facility] [facility]     Nicotine dependence     Schizophrenia ([facility] [address]          SURGICALHISTORY     History reviewed.  No pertinent surgical history. CURRENT MEDICATIONS       Discharge Medication List as of 3/6/2022  8:06 PM      CONTINUE these medications which have NOT CHANGED    Details   metFORMIN (GLUCOPHAGE) 500 MG tablet TAKE 1 TABLET BY MOUTH TWICE DAILY WITH MEALS, Disp-60 tablet, R-5Normal      vitamin D (ERGOCALCIFEROL) 1.25 MG (23204 UT) CAPS capsule TAKE 1 CAPSULE BY MOUTH 1 TIME A WEEK AFTER A MEAL, Disp-4 capsule, R-5Normal      lisinopril (PRINIVIL;ZESTRIL) 10 MG tablet Take 1 tablet by mouth daily, Disp-30 tablet, R-1Normal      ipratropium-albuterol (DUONEB) 0.5-2.5 (3) MG/3ML SOLN nebulizer solution Inhale 3 mLs into the lungs every 4 hours, Disp-360 mL, R-0Normal      PROAIR  (90 Base) MCG/ACT inhaler INHALE 2 PUFFS INTO THE LUNGS EVERY 6 HOURS AS NEEDED FOR WHEEZING, Disp-8.5 g, R-2Normal      vitamin B-1 (THIAMINE) 100 MG tablet Take 100 mg by mouth dailyHistorical Med      doxepin (SINEQUAN) 75 MG capsule Take 1 capsule by mouth nightly, Disp-30 capsule, R-1Normal      paliperidone (INVEGA) 6 MG extended release tablet Take 2 tablets by mouth daily, Disp-60 tablet, R-1Normal      QUEtiapine (SEROQUEL) 300 MG tablet Take 1 tablet by mouth nightly, Disp-30 tablet, R-1Normal      ferrous sulfate (IRON 325) 325 (65 Fe) MG tablet Take 1 tablet by mouth 2 times daily (with meals), Disp-30 tablet, R-0Normal      docusate sodium (COLACE) 100 MG capsule TAKE 1 CAPSULE BY MOUTH TWICE DAILY, Disp-60 capsule, R-11Normal      INCRUSE ELLIPTA 62.5 MCG/INH AEPB INHALE 1 PUFF EVERY DAY AS DIRECTED, Disp-30 each, R-0Patient will need appointment for further refillsNormal      tiotropium (SPIRIVA RESPIMAT) 2.5 MCG/ACT AERS inhaler Inhale 2 puffs into the lungs daily, Disp-1 Inhaler, R-2Normal      loratadine (CLARITIN) 10 MG tablet TAKE 1 TABLET BY MOUTH DAILY, Disp-30 tablet, R-3Normal             ALLERGIES     Patient has no known allergies.     FAMILY HISTORY       Family History   Problem Relation Age of Onset    Heart Disease Mother           SOCIAL HISTORY       Social History     Socioeconomic History    Marital status: Single     Spouse name: None    Number of children: None    Years of education: None    Highest education level: None   Occupational History    None   Tobacco Use    Smoking status: Current Every Day Smoker     Packs/day: 4.00     Types: Cigarettes    Smokeless tobacco: Never Used   Vaping Use    Vaping Use: Never used   Substance and Sexual Activity    Alcohol use: Not Currently     Comment: Rare    Drug use: No    Sexual activity: None   Other Topics Concern    None   Social History Narrative    None     Social Determinants of Health     Financial Resource Strain: Low Risk     Difficulty of Paying Living Expenses: Not hard at all   Food Insecurity: No Food Insecurity    Worried About Running Out of Food in the Last Year: Never true    [place] of Food in the Last Year: Never true   Transportation Needs: No Transportation Needs    Lack of Transportation (Medical): No    Lack of Transportation (Non-Medical):  No   Physical Activity:     Days of Exercise per Week: Not on file    Minutes of Exercise per Session: Not on file   Stress:     Feeling of Stress : Not on file   Social Connections:     Frequency of Communication with Friends and Family: Not on file    Frequency of Social Gatherings with Friends and Family: Not on file    Attends Latter day Services: Not on file    Active Member of Clubs or Organizations: Not on file    Attends Club or Organization Meetings: Not on file    Marital Status: Not on file   Intimate Partner Violence:     Fear of Current or Ex-Partner: Not on file    Emotionally Abused: Not on file    Physically Abused: Not on file    Sexually Abused: Not on file   Housing Stability:     Unable to Pay for Housing in the Last Year: Not on file    Number of [place] in the Last Year: Not on file    Unstable Housing in the Last Year: Not on file       SCREENINGS [place] Coma Scale  Eye Opening: Spontaneous  Best Verbal Response: Oriented  Best Motor Response: Obeys commands  [place] Coma Scale Score: 15 @FLOW([id number])@      PHYSICAL EXAM    (up to 7 for level 4, 8 or more for level 5)     ED Triage Vitals [03/06/22 1710]   BP Temp Temp src Pulse Resp SpO2 Height Weight   -- -- -- -- -- -- 4' 11\" (1.499 m) 208 lb (94.3 kg)       Physical Exam  Vitals and nursing note reviewed. Constitutional:       Appearance: She is well-developed. She is obese. HENT:      Head: Normocephalic and atraumatic. Eyes:      General: No scleral icterus. Right eye: No discharge. Left eye: No discharge. Cardiovascular:      Rate and Rhythm: Normal rate and regular rhythm. Pulmonary:      Effort: No respiratory distress. Musculoskeletal:      Cervical back: Normal range of motion and neck supple. Skin:     General: Skin is dry. Neurological:      Mental Status: She is alert. Psychiatric:         Mood and Affect: Mood is depressed. Affect is flat. Behavior: Behavior normal. Behavior is cooperative. Thought Content: Thought content does not include homicidal or suicidal ideation.          DIAGNOSTIC RESULTS     EKG: All EKG's are interpreted by the Emergency Department Physician who either signs or Co-signsthis chart in the absence of a cardiologist.        RADIOLOGY:   [place] such as CT, Ultrasound and MRI are read by the radiologist. Plain radiographic images are visualized and preliminarily interpreted by the emergency physician with the below findings:      Interpretation per the Radiologist below, if available at the time of this note:    No orders to display         ED BEDSIDEULTRASOUND:   Performed by ED Physician -none    LABS:  Labs Reviewed   CBC WITH AUTO DIFFERENTIAL - Abnormal; Notable for the following components:       Result Value    MCH 26.5 (*)     MCHC 31.3 (*)     RDW 20.1 (*)     Neutrophils % 66.3 (*)     All other components within normal limits   COMPREHENSIVE METABOLIC PANEL W/ REFLEX TO MG FOR LOW K - Abnormal; Notable for the following components:    Glucose 317 (*)     BUN 5 (*)     All other components within normal limits   HEMOGLOBIN A1C - Abnormal; Notable for the following components:    Hemoglobin A1C 7.8 (*)     All other components within normal limits   POCT GLUCOSE - Abnormal; Notable for the following components:    POC Glucose 244 (*)     All other components within normal limits   COVID-19, RAPID   URINE DRUG SCREEN   ETHANOL   PREGNANCY, URINE   ACETAMINOPHEN LEVEL   SALICYLATE LEVEL       All other labs were within normal range or not returned as of this dictation. EMERGENCY DEPARTMENT COURSE and DIFFERENTIALDIAGNOSIS/MDM:   Vitals:    Vitals:    03/06/22 1710 03/06/22 1925 03/06/22 2011   BP:  (!) 151/89 (!) 158/95   Pulse:  95 90   Resp:  22 18   Temp:  98.6 °F (37 °C)    TempSrc:  Oral    SpO2:  95% 96%   Weight: 208 lb (94.3 kg)     Height: 4' 11\" (1.499 m)             MDMpt seen by Demetrius Paredes with mental health and cleared for discharge. Sister says she is out of he doxepin and asks for a dose of this. HAs appointment next week at [address] [place] [address]      CONSULTS:  None    PROCEDURES:  Unless otherwise noted below, none     Procedures    FINAL IMPRESSION      1. Adult behavior problem    2. Other schizophrenia ([facility] [address]    3.  Type 2 diabetes mellitus without complication, without long-term current use of insulin ([facility] [address]    4. Smoking addiction        DISPOSITION/PLAN   DISPOSITION        PATIENT REFERRED TO:  [address] [address] [phone]  Call  follow up for behavioral issues    BARBARA Martini  [address] [facility]  [phone]    Call  recommended for options for smoking cessation due to heavy tobacco use      DISCHARGE MEDICATIONS:  Discharge Medication List as of 3/6/2022  8:06 PM             (Please note that

## 2022-04-13 DIAGNOSIS — J44.9 STAGE 2 MODERATE COPD BY GOLD CLASSIFICATION (HCC): ICD-10-CM

## 2022-04-13 NOTE — TELEPHONE ENCOUNTER
Luis Enrique Black called to request a refill on her medication.       Last office visit : 2/8/2022   Next office visit : Visit date not found     Requested Prescriptions     Signed Prescriptions Disp Refills    PROAIR  (90 Base) MCG/ACT inhaler 8.5 g 2     Sig: INHALE 2 PUFFS INTO THE LUNGS EVERY 6 HOURS AS NEEDED FOR WHEEZING     Authorizing Provider: Phillip Day     Ordering User: Breann Saleh MA

## 2022-04-19 RX ORDER — LISINOPRIL 10 MG/1
10 TABLET ORAL DAILY
Qty: 30 TABLET | Refills: 1 | Status: SHIPPED | OUTPATIENT
Start: 2022-04-19 | End: 2022-07-20

## 2022-06-28 LAB
ALBUMIN SERPL-MCNC: 3.8 G/DL (ref 3.5–5.2)
ALP BLD-CCNC: 86 U/L (ref 35–104)
ALT SERPL-CCNC: 8 U/L (ref 5–33)
ANION GAP SERPL CALCULATED.3IONS-SCNC: 10 MMOL/L (ref 7–19)
AST SERPL-CCNC: 8 U/L (ref 5–32)
BILIRUB SERPL-MCNC: <0.2 MG/DL (ref 0.2–1.2)
BUN BLDV-MCNC: 5 MG/DL (ref 6–20)
CALCIUM SERPL-MCNC: 9 MG/DL (ref 8.6–10)
CHLORIDE BLD-SCNC: 104 MMOL/L (ref 98–111)
CHOLESTEROL, TOTAL: 160 MG/DL (ref 160–199)
CO2: 27 MMOL/L (ref 22–29)
CREAT SERPL-MCNC: 0.4 MG/DL (ref 0.5–0.9)
GFR AFRICAN AMERICAN: >59
GFR NON-AFRICAN AMERICAN: >60
GLUCOSE BLD-MCNC: 142 MG/DL (ref 74–109)
HBA1C MFR BLD: 6.9 % (ref 4–6)
HDLC SERPL-MCNC: 30 MG/DL (ref 65–121)
LDL CHOLESTEROL CALCULATED: 95 MG/DL
POTASSIUM SERPL-SCNC: 3.9 MMOL/L (ref 3.5–5)
SODIUM BLD-SCNC: 141 MMOL/L (ref 136–145)
TOTAL PROTEIN: 7.1 G/DL (ref 6.6–8.7)
TRIGL SERPL-MCNC: 175 MG/DL (ref 0–149)
VALPROIC ACID LEVEL: 47.3 UG/ML (ref 50–100)

## 2022-07-20 RX ORDER — LISINOPRIL 10 MG/1
10 TABLET ORAL DAILY
Qty: 30 TABLET | Refills: 1 | Status: SHIPPED | OUTPATIENT
Start: 2022-07-20 | End: 2022-10-19 | Stop reason: SDUPTHER

## 2022-09-05 DIAGNOSIS — K59.01 SLOW TRANSIT CONSTIPATION: ICD-10-CM

## 2022-09-05 RX ORDER — DOCUSATE SODIUM 100 MG/1
CAPSULE, LIQUID FILLED ORAL
Qty: 60 CAPSULE | Refills: 11 | OUTPATIENT
Start: 2022-09-05

## 2022-09-15 NOTE — TELEPHONE ENCOUNTER
Spoke with pt's mother, states her sister makes all her arrangements for appts and will discuss. [facility] desk number given, due to transportation may need to establish with a provider in [place].

## 2022-10-11 NOTE — TELEPHONE ENCOUNTER
Sam Yepez called to request a refill on her medication.       Last office visit : 2/8/2022   Next office visit : Visit date not found     Requested Prescriptions     Pending Prescriptions Disp Refills    metFORMIN (GLUCOPHAGE) 500 MG tablet [Pharmacy Med Name: METFORMIN 500MG TABLETS] 60 tablet 0     Sig: TAKE 1 TABLET BY MOUTH TWICE DAILY WITH MEALS            Shaw Tracy MA

## 2022-10-19 ENCOUNTER — OFFICE VISIT (OUTPATIENT)
Dept: FAMILY MEDICINE CLINIC | Age: 47
End: 2022-10-19
Payer: MEDICAID

## 2022-10-19 VITALS
BODY MASS INDEX: 40.92 KG/M2 | TEMPERATURE: 97.1 F | DIASTOLIC BLOOD PRESSURE: 82 MMHG | OXYGEN SATURATION: 96 % | WEIGHT: 203 LBS | SYSTOLIC BLOOD PRESSURE: 128 MMHG | HEIGHT: 59 IN | HEART RATE: 98 BPM

## 2022-10-19 DIAGNOSIS — F20.89 OTHER SCHIZOPHRENIA (HCC): Primary | ICD-10-CM

## 2022-10-19 DIAGNOSIS — I10 PRIMARY HYPERTENSION: ICD-10-CM

## 2022-10-19 DIAGNOSIS — Z71.6 TOBACCO ABUSE COUNSELING: ICD-10-CM

## 2022-10-19 DIAGNOSIS — E11.9 TYPE 2 DIABETES MELLITUS WITHOUT COMPLICATION, WITHOUT LONG-TERM CURRENT USE OF INSULIN (HCC): ICD-10-CM

## 2022-10-19 DIAGNOSIS — Z87.891 PERSONAL HISTORY OF NICOTINE DEPENDENCE: ICD-10-CM

## 2022-10-19 DIAGNOSIS — J44.9 STAGE 3 SEVERE COPD BY GOLD CLASSIFICATION (HCC): ICD-10-CM

## 2022-10-19 PROCEDURE — 99406 BEHAV CHNG SMOKING 3-10 MIN: CPT | Performed by: NURSE PRACTITIONER

## 2022-10-19 PROCEDURE — 3044F HG A1C LEVEL LT 7.0%: CPT | Performed by: NURSE PRACTITIONER

## 2022-10-19 PROCEDURE — 99214 OFFICE O/P EST MOD 30 MIN: CPT | Performed by: NURSE PRACTITIONER

## 2022-10-19 RX ORDER — BUDESONIDE, GLYCOPYRROLATE, AND FORMOTEROL FUMARATE 160; 9; 4.8 UG/1; UG/1; UG/1
2 AEROSOL, METERED RESPIRATORY (INHALATION) 2 TIMES DAILY
Qty: 1 EACH | Refills: 5 | Status: SHIPPED | OUTPATIENT
Start: 2022-10-19

## 2022-10-19 RX ORDER — LISINOPRIL 10 MG/1
10 TABLET ORAL DAILY
Qty: 90 TABLET | Refills: 3 | Status: SHIPPED | OUTPATIENT
Start: 2022-10-19

## 2022-10-19 ASSESSMENT — ENCOUNTER SYMPTOMS
GASTROINTESTINAL NEGATIVE: 1
EYES NEGATIVE: 1
RESPIRATORY NEGATIVE: 1

## 2022-10-19 NOTE — PROGRESS NOTES
[facility] PHYSICIAN SERVICES  [facility]  [place]  [address] [address]  Dept: [phone]  Dept Fax: [phone]  Loc: [phone]    Patrick Bryant is a 40 y.o. female who presents today for her medical conditions/complaints as noted below. Patrick Bryant is c/o of Follow-up (diabetes)      Chief Complaint   Patient presents with    Follow-up     diabetes       HPI:     HPI  Patient presents today for routine follow up of DM, HTN, and tobacco use. She has no other complaints today to note. She has not been checking blood sugars or BP at home. She denies chest pain or SOB at all. Patient is smoking 3-5 packs of cigarettes per day. She is not wanting to stop smoking any time soon. She does tell her sister that she has voices telling her she does not need to stop the smoking. The voices do not tell her anything else to do just to continue to smoke. Patient has been taking her medications including her seroquel, invega as prescribed. Past Medical History:   Diagnosis Date    Diabetes mellitus ([facility] [address]     Nicotine dependence     Schizophrenia ([facility] [address]         History reviewed. No pertinent surgical history.     Social History     Tobacco Use    Smoking status: Every Day     Packs/day: 4.00     Types: Cigarettes    Smokeless tobacco: Never   Substance Use Topics    Alcohol use: Not Currently     Comment: Rare        Current Outpatient Medications   Medication Sig Dispense Refill    lisinopril (PRINIVIL;ZESTRIL) 10 MG tablet Take 1 tablet by mouth daily 90 tablet 3    Budeson-Glycopyrrol-Formoterol (BREZTRI AEROSPHERE) 160-9-4.8 MCG/ACT AERO Inhale 2 puffs into the lungs in the morning and at bedtime 1 each 5    metFORMIN (GLUCOPHAGE) 500 MG tablet TAKE 1 TABLET BY MOUTH TWICE DAILY WITH MEALS 60 tablet 2    PROAIR  (90 Base) MCG/ACT inhaler INHALE 2 PUFFS INTO THE LUNGS EVERY 6 HOURS AS NEEDED FOR WHEEZING 8.5 g 2    vitamin D (ERGOCALCIFEROL) 1.25 MG (81961 UT) CAPS capsule TAKE 1 CAPSULE BY MOUTH 1 TIME A WEEK AFTER A MEAL 4 capsule 5    ipratropium-albuterol (DUONEB) 0.5-2.5 (3) MG/3ML SOLN nebulizer solution Inhale 3 mLs into the lungs every 4 hours 360 mL 0    vitamin B-1 (THIAMINE) 100 MG tablet Take 100 mg by mouth daily      doxepin (SINEQUAN) 75 MG capsule Take 1 capsule by mouth nightly 30 capsule 1    paliperidone (INVEGA) 6 MG extended release tablet Take 2 tablets by mouth daily 60 tablet 1    QUEtiapine (SEROQUEL) 300 MG tablet Take 1 tablet by mouth nightly 30 tablet 1    ferrous sulfate (IRON 325) 325 (65 Fe) MG tablet Take 1 tablet by mouth 2 times daily (with meals) 30 tablet 0    docusate sodium (COLACE) 100 MG capsule TAKE 1 CAPSULE BY MOUTH TWICE DAILY 60 capsule 11    loratadine (CLARITIN) 10 MG tablet TAKE 1 TABLET BY MOUTH DAILY 30 tablet 3     No current facility-administered medications for this visit. No Known Allergies    Family History   Problem Relation Age of Onset    Heart Disease Mother                Subjective:      Review of Systems   Constitutional: Negative. HENT: Negative. Eyes: Negative. Respiratory: Negative. Cardiovascular: Negative. Gastrointestinal: Negative. Endocrine: Negative. Genitourinary: Negative. Musculoskeletal: Negative. Skin: Negative. Neurological: Negative. Hematological: Negative. Psychiatric/Behavioral: Negative. Objective:     Physical Exam  Vitals and nursing note reviewed. Constitutional:       Appearance: Normal appearance. She is well-developed. HENT:      Head: Normocephalic and atraumatic. Right Ear: Hearing, tympanic membrane, ear canal and external ear normal.      Left Ear: Hearing, tympanic membrane, ear canal and external ear normal.      Nose: Nose normal.      Mouth/Throat:      Pharynx: Uvula midline. Eyes:      General: Lids are normal. No scleral icterus.      Conjunctiva/sclera: Conjunctivae normal.      Pupils: Pupils are equal, round, and reactive to light.   Neck:      Thyroid: No thyroid mass or thyromegaly. Trachea: Trachea normal.   Cardiovascular:      Rate and Rhythm: Normal rate and regular rhythm. Pulses: Normal pulses. Heart sounds: Normal heart sounds. Pulmonary:      Effort: Pulmonary effort is normal.      Breath sounds: Wheezing and rhonchi present. No decreased breath sounds or rales. Abdominal:      General: Bowel sounds are normal.      Palpations: Abdomen is soft. Musculoskeletal:         General: Normal range of motion. Cervical back: Normal range of motion and neck supple. No tenderness. Thoracic back: Normal. No tenderness. Normal range of motion. Lumbar back: Normal. No tenderness. Normal range of motion. Skin:     General: Skin is warm and dry. Neurological:      Mental Status: She is alert and oriented to person, place, and time. Psychiatric:         Mood and Affect: Mood is not anxious or depressed. Affect is flat. Speech: Speech is delayed. Behavior: Behavior is slowed and withdrawn. Thought Content: Thought content normal.         Judgment: Judgment normal.       /82   Pulse 98   Temp 97.1 °F (36.2 °C)   Ht 4' 11\" (1.499 m)   Wt 203 lb (92.1 kg)   SpO2 96%   BMI 41.00 kg/m²     Assessment:      Diagnosis Orders   1. Other schizophrenia ([facility] [address]        2. Stage 3 severe COPD by GOLD classification ([address]  Budeson-Glycopyrrol-Formoterol (BREZTRI AEROSPHERE) 160-9-4.8 MCG/ACT AERO      3. Primary hypertension  lisinopril (PRINIVIL;ZESTRIL) 10 MG tablet      4. Type 2 diabetes mellitus without complication, without long-term current use of insulin ([address]        5. Tobacco abuse counseling        6. Personal history of nicotine dependence            No results found for this visit on 10/19/22. Plan:     1. Other schizophrenia ([address]  stable    2.  Stage 3 severe COPD by GOLD classification ([address]  Starting Marietta Joshi for worsening symptoms  - Budeson-Glycopyrrol-Formoterol Joel Clause on 10/19/2022 at 2:56 PM

## 2023-02-01 NOTE — TELEPHONE ENCOUNTER
Rahda called requesting a refill of the below medication which has been pended for you:     Requested Prescriptions     Pending Prescriptions Disp Refills    metFORMIN (GLUCOPHAGE) 500 MG tablet [Pharmacy Med Name: METFORMIN 500MG TABLETS] 60 tablet 2     Sig: TAKE 1 TABLET BY MOUTH TWICE DAILY WITH MEALS       Last Appointment Date: 10/19/2022  Next Appointment Date: Visit date not found    No Known Allergies

## 2023-05-02 DIAGNOSIS — J44.9 STAGE 3 SEVERE COPD BY GOLD CLASSIFICATION (HCC): ICD-10-CM

## 2023-05-02 RX ORDER — BUDESONIDE, GLYCOPYRROLATE, AND FORMOTEROL FUMARATE 160; 9; 4.8 UG/1; UG/1; UG/1
2 AEROSOL, METERED RESPIRATORY (INHALATION) 2 TIMES DAILY
Qty: 10.7 G | OUTPATIENT
Start: 2023-05-02

## 2023-05-09 NOTE — TELEPHONE ENCOUNTER
Received fax from pharmacy requesting refill on pts medication(s). Pt was last seen in office on 2/8/2022  and has a follow up scheduled for Visit date not found. Will send request to  Chica Bradley  for authorization.      Requested Prescriptions     Pending Prescriptions Disp Refills    metFORMIN (GLUCOPHAGE) 500 MG tablet [Pharmacy Med Name: METFORMIN 500MG TABLETS] 60 tablet 2     Sig: TAKE 1 TABLET BY MOUTH TWICE DAILY WITH MEALS

## 2023-06-07 ENCOUNTER — OFFICE VISIT (OUTPATIENT)
Dept: FAMILY MEDICINE CLINIC | Age: 48
End: 2023-06-07
Payer: MEDICAID

## 2023-06-07 VITALS
BODY MASS INDEX: 40.32 KG/M2 | HEIGHT: 59 IN | DIASTOLIC BLOOD PRESSURE: 80 MMHG | OXYGEN SATURATION: 94 % | SYSTOLIC BLOOD PRESSURE: 126 MMHG | HEART RATE: 98 BPM | TEMPERATURE: 97.8 F | WEIGHT: 200 LBS

## 2023-06-07 DIAGNOSIS — J44.9 STAGE 3 SEVERE COPD BY GOLD CLASSIFICATION (HCC): ICD-10-CM

## 2023-06-07 DIAGNOSIS — Z87.891 PERSONAL HISTORY OF TOBACCO USE, PRESENTING HAZARDS TO HEALTH: ICD-10-CM

## 2023-06-07 DIAGNOSIS — I10 PRIMARY HYPERTENSION: ICD-10-CM

## 2023-06-07 DIAGNOSIS — E11.9 TYPE 2 DIABETES MELLITUS WITHOUT COMPLICATION, WITHOUT LONG-TERM CURRENT USE OF INSULIN (HCC): ICD-10-CM

## 2023-06-07 DIAGNOSIS — F20.89 OTHER SCHIZOPHRENIA (HCC): ICD-10-CM

## 2023-06-07 DIAGNOSIS — Z00.00 ENCOUNTER FOR WELL ADULT EXAM WITHOUT ABNORMAL FINDINGS: Primary | ICD-10-CM

## 2023-06-07 LAB
ALBUMIN SERPL-MCNC: 3.9 G/DL (ref 3.5–5.2)
ALP SERPL-CCNC: 77 U/L (ref 35–104)
ALT SERPL-CCNC: 15 U/L (ref 5–33)
ANION GAP SERPL CALCULATED.3IONS-SCNC: 14 MMOL/L (ref 7–19)
AST SERPL-CCNC: 16 U/L (ref 5–32)
BASOPHILS # BLD: 0 K/UL (ref 0–0.2)
BASOPHILS NFR BLD: 0.4 % (ref 0–1)
BILIRUB SERPL-MCNC: <0.2 MG/DL (ref 0.2–1.2)
BUN SERPL-MCNC: 7 MG/DL (ref 6–20)
CALCIUM SERPL-MCNC: 9.5 MG/DL (ref 8.6–10)
CHLORIDE SERPL-SCNC: 102 MMOL/L (ref 98–111)
CHOLEST SERPL-MCNC: 170 MG/DL (ref 160–199)
CO2 SERPL-SCNC: 24 MMOL/L (ref 22–29)
CREAT SERPL-MCNC: <0.5 MG/DL (ref 0.5–0.9)
EOSINOPHIL # BLD: 0.1 K/UL (ref 0–0.6)
EOSINOPHIL NFR BLD: 0.8 % (ref 0–5)
ERYTHROCYTE [DISTWIDTH] IN BLOOD BY AUTOMATED COUNT: 16 % (ref 11.5–14.5)
GLUCOSE SERPL-MCNC: 104 MG/DL (ref 74–109)
HBA1C MFR BLD: 6.1 % (ref 4–6)
HCT VFR BLD AUTO: 43.6 % (ref 37–47)
HDLC SERPL-MCNC: 31 MG/DL (ref 65–121)
HGB BLD-MCNC: 13.9 G/DL (ref 12–16)
IMM GRANULOCYTES # BLD: 0 K/UL
LDLC SERPL CALC-MCNC: 100 MG/DL
LYMPHOCYTES # BLD: 2.4 K/UL (ref 1.1–4.5)
LYMPHOCYTES NFR BLD: 32.7 % (ref 20–40)
MCH RBC QN AUTO: 29.4 PG (ref 27–31)
MCHC RBC AUTO-ENTMCNC: 31.9 G/DL (ref 33–37)
MCV RBC AUTO: 92.2 FL (ref 81–99)
MONOCYTES # BLD: 0.7 K/UL (ref 0–0.9)
MONOCYTES NFR BLD: 9.7 % (ref 0–10)
NEUTROPHILS # BLD: 4 K/UL (ref 1.5–7.5)
NEUTS SEG NFR BLD: 56.1 % (ref 50–65)
PLATELET # BLD AUTO: 271 K/UL (ref 130–400)
PMV BLD AUTO: 12 FL (ref 9.4–12.3)
POTASSIUM SERPL-SCNC: 3.8 MMOL/L (ref 3.5–5)
PROLACTIN SERPL-MCNC: 63.78 NG/ML (ref 4.79–23.3)
PROT SERPL-MCNC: 7.4 G/DL (ref 6.6–8.7)
RBC # BLD AUTO: 4.73 M/UL (ref 4.2–5.4)
SODIUM SERPL-SCNC: 140 MMOL/L (ref 136–145)
TRIGL SERPL-MCNC: 193 MG/DL (ref 0–149)
VALPROATE SERPL-MCNC: 92.3 UG/ML (ref 50–100)
WBC # BLD AUTO: 7.2 K/UL (ref 4.8–10.8)

## 2023-06-07 PROCEDURE — 3074F SYST BP LT 130 MM HG: CPT | Performed by: NURSE PRACTITIONER

## 2023-06-07 PROCEDURE — 99406 BEHAV CHNG SMOKING 3-10 MIN: CPT | Performed by: NURSE PRACTITIONER

## 2023-06-07 PROCEDURE — G0447 BEHAVIOR COUNSEL OBESITY 15M: HCPCS | Performed by: NURSE PRACTITIONER

## 2023-06-07 PROCEDURE — 99396 PREV VISIT EST AGE 40-64: CPT | Performed by: NURSE PRACTITIONER

## 2023-06-07 PROCEDURE — 3079F DIAST BP 80-89 MM HG: CPT | Performed by: NURSE PRACTITIONER

## 2023-06-07 ASSESSMENT — PATIENT HEALTH QUESTIONNAIRE - PHQ9
SUM OF ALL RESPONSES TO PHQ QUESTIONS 1-9: 0
2. FEELING DOWN, DEPRESSED OR HOPELESS: 0
SUM OF ALL RESPONSES TO PHQ9 QUESTIONS 1 & 2: 0
1. LITTLE INTEREST OR PLEASURE IN DOING THINGS: 0

## 2023-06-07 ASSESSMENT — ENCOUNTER SYMPTOMS
EYES NEGATIVE: 1
GASTROINTESTINAL NEGATIVE: 1
RESPIRATORY NEGATIVE: 1

## 2023-06-07 NOTE — PROGRESS NOTES
Obesity Counseling: Assessed behavioral health risks and factors affecting choice of behavior. Suggested weight control approaches, including dietary changes behavioral modification and follow up plan. Provided educational and support documentation. Time spent (minutes): ***    Tobacco Cessation Counseling: Patient advised about behavior change, including information about personal health harms, usage of appropriate cessation measures and benefits of cessation.   Time spent (minutes): ***
tablet     Refill:  5            Patient offered educational handouts and has had all questions answered. Patient voices understanding and agrees to plans along with risks and benefits of plan. Patient is instructed to continue prior meds, diet, and exercise plans as instructed. Patient agrees to follow up as instructed and sooner if needed. Patient agrees to go to ER if condition becomes emergent. EMR Dragon/transcription disclaimer: Some of this encounter note is an electronic transcription/translation of spoken language to printed text. The electronic translation of spoken language may permit erroneous, or at times, nonsensical words or phrases to be inadvertently transcribed. Although I have reviewed the note for such errors, some may still exist.    Electronically signed by BARBARA Ashford on 6/7/2023 at 9:25 AM                   Obesity Counseling: Assessed behavioral health risks and factors affecting choice of behavior. Suggested weight control approaches, including dietary changes behavioral modification and follow up plan. Provided educational and support documentation. Time spent (minutes): 15     Tobacco Cessation Counseling: Patient advised about behavior change, including information about personal health harms, usage of appropriate cessation measures and benefits of cessation.   Time spent (minutes): 5

## 2023-10-24 DIAGNOSIS — I10 PRIMARY HYPERTENSION: ICD-10-CM

## 2023-10-24 RX ORDER — LISINOPRIL 10 MG/1
10 TABLET ORAL DAILY
Qty: 90 TABLET | Refills: 3 | Status: SHIPPED | OUTPATIENT
Start: 2023-10-24

## 2023-10-24 NOTE — TELEPHONE ENCOUNTER
Received fax from pharmacy requesting refill on pts medication(s). Pt was last seen in office on 6/7/2023  and has a follow up scheduled for 12/7/2023. Will send request to  Una Nayak  for authorization.      Requested Prescriptions     Pending Prescriptions Disp Refills    lisinopril (PRINIVIL;ZESTRIL) 10 MG tablet [Pharmacy Med Name: LISINOPRIL 10MG TABLETS] 90 tablet 3     Sig: TAKE 1 TABLET BY MOUTH DAILY

## 2023-11-30 DIAGNOSIS — J44.9 STAGE 3 SEVERE COPD BY GOLD CLASSIFICATION (HCC): ICD-10-CM

## 2023-11-30 RX ORDER — BUDESONIDE, GLYCOPYRROLATE, AND FORMOTEROL FUMARATE 160; 9; 4.8 UG/1; UG/1; UG/1
2 AEROSOL, METERED RESPIRATORY (INHALATION) 2 TIMES DAILY
Qty: 1 EACH | Refills: 5 | Status: SHIPPED | OUTPATIENT
Start: 2023-11-30

## 2023-11-30 NOTE — TELEPHONE ENCOUNTER
Received call/My Chart Message from patient requesting refill on medication(s). Pt was last seen in office on 6/7/2023  and has a follow up scheduled for 12/7/2023. Will send request to provider for authorization.      Requested Prescriptions     Pending Prescriptions Disp Refills    Budeson-Glycopyrrol-Formoterol (BREZTRI AEROSPHERE) 160-9-4.8 MCG/ACT AERO 1 each 5     Sig: Inhale 2 puffs into the lungs in the morning and at bedtime

## 2023-12-04 NOTE — TELEPHONE ENCOUNTER
Radha called requesting a refill of the below medication which has been pended for you:     Requested Prescriptions     Pending Prescriptions Disp Refills    metFORMIN (GLUCOPHAGE) 1000 MG tablet [Pharmacy Med Name: METFORMIN 1000MG TABLETS] 60 tablet 5     Sig: TAKE 1 TABLET BY MOUTH TWICE DAILY WITH MEALS       Last Appointment Date: 6/7/2023  Next Appointment Date: 12/7/2023    No Known Allergies

## 2024-04-29 ENCOUNTER — TELEPHONE (OUTPATIENT)
Dept: FAMILY MEDICINE CLINIC | Age: 49
End: 2024-04-29

## 2024-04-29 NOTE — TELEPHONE ENCOUNTER
----- Message from BARBARA Flaherty sent at 4/29/2024  8:38 AM CDT -----  Please call patient and let them know results.   Cologuard sample unable to be processed.  Please ensure patient gets no collection kit.

## 2024-04-29 NOTE — TELEPHONE ENCOUNTER
Called patient, spoke with: Parent(s) regarding the results of the patients most recent Cologuard.  I advised Parent(s) of Stephanie Bertrand recommendations.   Parent(s) did voice understanding

## 2024-05-29 NOTE — TELEPHONE ENCOUNTER
Radha called requesting a refill of the below medication which has been pended for you:     Requested Prescriptions     Pending Prescriptions Disp Refills    metFORMIN (GLUCOPHAGE) 1000 MG tablet [Pharmacy Med Name: METFORMIN 1000MG TABLETS] 60 tablet 5     Sig: TAKE 1 TABLET BY MOUTH TWICE DAILY WITH MEALS       Last Appointment Date: 12/21/2023  Next Appointment Date: 6/21/2024    No Known Allergies

## 2024-06-04 DIAGNOSIS — J44.9 STAGE 3 SEVERE COPD BY GOLD CLASSIFICATION (HCC): ICD-10-CM

## 2024-06-04 RX ORDER — BUDESONIDE, GLYCOPYRROLATE, AND FORMOTEROL FUMARATE 160; 9; 4.8 UG/1; UG/1; UG/1
2 AEROSOL, METERED RESPIRATORY (INHALATION) 2 TIMES DAILY
Qty: 10.7 G | Refills: 2 | Status: SHIPPED | OUTPATIENT
Start: 2024-06-04

## 2024-06-04 NOTE — TELEPHONE ENCOUNTER
Radha called requesting a refill of the below medication which has been pended for you:     Requested Prescriptions     Pending Prescriptions Disp Refills    BRELooxciePHERE 160-9-4.8 MCG/ACT AERO [Pharmacy Med Name: BREZTRI AERO SPHERE INHALER 120 INH] 10.7 g      Sig: INHALE 2 PUFFS INTO THE LUNGS IN THE MORNING AND AT BEDTIME       Last Appointment Date: 12/21/2023  Next Appointment Date: 6/21/2024    No Known Allergies

## 2024-06-19 ASSESSMENT — PATIENT HEALTH QUESTIONNAIRE - PHQ9
SUM OF ALL RESPONSES TO PHQ QUESTIONS 1-9: 2
1. LITTLE INTEREST OR PLEASURE IN DOING THINGS: SEVERAL DAYS
SUM OF ALL RESPONSES TO PHQ9 QUESTIONS 1 & 2: 2
1. LITTLE INTEREST OR PLEASURE IN DOING THINGS: SEVERAL DAYS
2. FEELING DOWN, DEPRESSED OR HOPELESS: SEVERAL DAYS
SUM OF ALL RESPONSES TO PHQ QUESTIONS 1-9: 2
SUM OF ALL RESPONSES TO PHQ9 QUESTIONS 1 & 2: 2
2. FEELING DOWN, DEPRESSED OR HOPELESS: SEVERAL DAYS
SUM OF ALL RESPONSES TO PHQ QUESTIONS 1-9: 2
SUM OF ALL RESPONSES TO PHQ QUESTIONS 1-9: 2

## 2024-06-23 SDOH — ECONOMIC STABILITY: FOOD INSECURITY: WITHIN THE PAST 12 MONTHS, THE FOOD YOU BOUGHT JUST DIDN'T LAST AND YOU DIDN'T HAVE MONEY TO GET MORE.: NEVER TRUE

## 2024-06-23 SDOH — ECONOMIC STABILITY: FOOD INSECURITY: WITHIN THE PAST 12 MONTHS, YOU WORRIED THAT YOUR FOOD WOULD RUN OUT BEFORE YOU GOT MONEY TO BUY MORE.: NEVER TRUE

## 2024-06-23 SDOH — ECONOMIC STABILITY: INCOME INSECURITY: HOW HARD IS IT FOR YOU TO PAY FOR THE VERY BASICS LIKE FOOD, HOUSING, MEDICAL CARE, AND HEATING?: NOT HARD AT ALL

## 2024-06-23 SDOH — ECONOMIC STABILITY: HOUSING INSECURITY
IN THE LAST 12 MONTHS, WAS THERE A TIME WHEN YOU DID NOT HAVE A STEADY PLACE TO SLEEP OR SLEPT IN A SHELTER (INCLUDING NOW)?: NO

## 2024-06-23 SDOH — ECONOMIC STABILITY: TRANSPORTATION INSECURITY
IN THE PAST 12 MONTHS, HAS LACK OF TRANSPORTATION KEPT YOU FROM MEETINGS, WORK, OR FROM GETTING THINGS NEEDED FOR DAILY LIVING?: PATIENT DECLINED

## 2024-06-26 ENCOUNTER — OFFICE VISIT (OUTPATIENT)
Dept: FAMILY MEDICINE CLINIC | Age: 49
End: 2024-06-26
Payer: MEDICAID

## 2024-06-26 VITALS
DIASTOLIC BLOOD PRESSURE: 84 MMHG | OXYGEN SATURATION: 96 % | HEIGHT: 59 IN | HEART RATE: 98 BPM | BODY MASS INDEX: 36.69 KG/M2 | WEIGHT: 182 LBS | SYSTOLIC BLOOD PRESSURE: 128 MMHG | TEMPERATURE: 98.9 F

## 2024-06-26 DIAGNOSIS — F20.89 OTHER SCHIZOPHRENIA (HCC): ICD-10-CM

## 2024-06-26 DIAGNOSIS — Z72.89 OTHER PROBLEMS RELATED TO LIFESTYLE: ICD-10-CM

## 2024-06-26 DIAGNOSIS — Z87.891 PERSONAL HISTORY OF TOBACCO USE, PRESENTING HAZARDS TO HEALTH: ICD-10-CM

## 2024-06-26 DIAGNOSIS — Z12.12 SCREENING FOR COLORECTAL CANCER: ICD-10-CM

## 2024-06-26 DIAGNOSIS — Z12.11 SCREENING FOR COLORECTAL CANCER: ICD-10-CM

## 2024-06-26 DIAGNOSIS — Z12.31 ENCOUNTER FOR SCREENING MAMMOGRAM FOR BREAST CANCER: ICD-10-CM

## 2024-06-26 DIAGNOSIS — E55.9 VITAMIN D DEFICIENCY: ICD-10-CM

## 2024-06-26 DIAGNOSIS — E11.9 TYPE 2 DIABETES MELLITUS WITHOUT COMPLICATION, WITHOUT LONG-TERM CURRENT USE OF INSULIN (HCC): ICD-10-CM

## 2024-06-26 DIAGNOSIS — J44.9 STAGE 3 SEVERE COPD BY GOLD CLASSIFICATION (HCC): ICD-10-CM

## 2024-06-26 DIAGNOSIS — Z11.59 NEED FOR HEPATITIS C SCREENING TEST: ICD-10-CM

## 2024-06-26 DIAGNOSIS — Z00.00 ENCOUNTER FOR WELL ADULT EXAM WITHOUT ABNORMAL FINDINGS: Primary | ICD-10-CM

## 2024-06-26 PROCEDURE — 99396 PREV VISIT EST AGE 40-64: CPT | Performed by: NURSE PRACTITIONER

## 2024-06-26 PROCEDURE — 99406 BEHAV CHNG SMOKING 3-10 MIN: CPT | Performed by: NURSE PRACTITIONER

## 2024-06-26 RX ORDER — DIVALPROEX SODIUM 500 MG/1
TABLET, DELAYED RELEASE ORAL
COMMUNITY
Start: 2024-06-24

## 2024-06-26 RX ORDER — DIVALPROEX SODIUM 250 MG/1
TABLET, DELAYED RELEASE ORAL
COMMUNITY
Start: 2024-06-24

## 2024-06-26 ASSESSMENT — ENCOUNTER SYMPTOMS
GASTROINTESTINAL NEGATIVE: 1
EYES NEGATIVE: 1
SHORTNESS OF BREATH: 1

## 2024-06-26 NOTE — PATIENT INSTRUCTIONS
what did not?  What will be the most difficult situations for you after you quit? How will you plan to handle them?  Who can help you through the tough times? Your family? Friends? Caregiver?  What pleasures do you get from smoking? What ways can you still get pleasure if you quit?  Here are some questions to ask your caregiver:  How can you help me to be successful at quitting?  What medicine do you think would be best for me and how should I take it?  What should I do if I need more help?  What is smoking withdrawal like? How can I get information on withdrawal?  Quitting takes hard work and a lot of effort, but you can quit smoking.  FOR MORE INFORMATION   Smokefree.gov (http://www.smokefree.gov) provides free, accurate, evidence-based information and professional assistance to help support the immediate and long-term needs of people trying to quit smoking.  Document Released: 12/12/2002 Document Revised: 12/06/2012 Document Reviewed: 10/04/2010  ExitCare® Patient Information ©2012 [employer].

## 2024-06-26 NOTE — PROGRESS NOTES
[facility]  [address] [place] [address]  Dept: [phone]  Dept Fax: [phone]  Loc: [phone]    Radha Medeiros is a 48 y.o. female who presents today for her medical conditions/complaints as noted below.  Radha Medeiros is c/o of Annual Exam      Chief Complaint   Patient presents with    Annual Exam       HPI:     HPI  Patient presents today for annual exam.  She has no new complaints to be discussed today.  Patient continues to see [address] [place] [address] for her other chronic conditions such as schizophrenia.  They are managing these medications at this time.  She does not need any medication refills.    Past Medical History:   Diagnosis Date    Diabetes mellitus (HCC)     Nicotine dependence     Schizophrenia (HCC)         No past surgical history on file.    Social History     Tobacco Use    Smoking status: Every Day     Current packs/day: 4.00     Types: Cigarettes    Smokeless tobacco: Never   Substance Use Topics    Alcohol use: Not Currently     Comment: Rare        Current Outpatient Medications   Medication Sig Dispense Refill    divalproex (DEPAKOTE) 250 MG DR tablet       divalproex (DEPAKOTE) 500 MG DR tablet       Budeson-Glycopyrrol-Formoterol (BREZTRI AEROSPHERE) 160-9-4.8 MCG/ACT AERO INHALE 2 PUFFS INTO THE LUNGS IN THE MORNING AND AT BEDTIME 10.7 g 2    metFORMIN (GLUCOPHAGE) 1000 MG tablet TAKE 1 TABLET BY MOUTH TWICE DAILY WITH MEALS 60 tablet 5    lisinopril (PRINIVIL;ZESTRIL) 10 MG tablet TAKE 1 TABLET BY MOUTH DAILY 90 tablet 3    PROAIR  (90 Base) MCG/ACT inhaler INHALE 2 PUFFS INTO THE LUNGS EVERY 6 HOURS AS NEEDED FOR WHEEZING 8.5 g 2    ipratropium-albuterol (DUONEB) 0.5-2.5 (3) MG/3ML SOLN nebulizer solution Inhale 3 mLs into the lungs every 4 hours 360 mL 0    doxepin (SINEQUAN) 75 MG capsule Take 1 capsule by mouth nightly 30 capsule 1    paliperidone (INVEGA) 6 MG extended release tablet Take 2 tablets by mouth daily 60 tablet 1

## 2024-10-23 DIAGNOSIS — I10 PRIMARY HYPERTENSION: ICD-10-CM

## 2024-10-23 RX ORDER — LISINOPRIL 10 MG/1
10 TABLET ORAL DAILY
Qty: 90 TABLET | Refills: 3 | Status: SHIPPED | OUTPATIENT
Start: 2024-10-23

## 2024-10-23 NOTE — TELEPHONE ENCOUNTER
Pharmacy requests a refill on Radha Medeiros's medication.    Last Office Visit: 6/26/2024  Next Office Visit: Visit date not found     Requested Prescriptions     Pending Prescriptions Disp Refills    lisinopril (PRINIVIL;ZESTRIL) 10 MG tablet [Pharmacy Med Name: LISINOPRIL 10MG TABLETS] 90 tablet 3     Sig: TAKE 1 TABLET BY MOUTH DAILY       Linda Barlow LPN

## 2024-11-26 NOTE — TELEPHONE ENCOUNTER
Received fax from pharmacy requesting refill on pts medication(s). Pt was last seen in office on 6/26/2024  and has a follow up scheduled for Visit date not found. Will send request to  Stephanie Bertrand  for authorization.     Requested Prescriptions     Pending Prescriptions Disp Refills    metFORMIN (GLUCOPHAGE) 1000 MG tablet [Pharmacy Med Name: METFORMIN 1000MG TABLETS] 60 tablet 5     Sig: TAKE 1 TABLET BY MOUTH TWICE DAILY WITH MEALS

## 2025-02-09 SDOH — ECONOMIC STABILITY: FOOD INSECURITY: WITHIN THE PAST 12 MONTHS, YOU WORRIED THAT YOUR FOOD WOULD RUN OUT BEFORE YOU GOT MONEY TO BUY MORE.: NEVER TRUE

## 2025-02-09 SDOH — ECONOMIC STABILITY: INCOME INSECURITY: IN THE LAST 12 MONTHS, WAS THERE A TIME WHEN YOU WERE NOT ABLE TO PAY THE MORTGAGE OR RENT ON TIME?: NO

## 2025-02-09 SDOH — ECONOMIC STABILITY: TRANSPORTATION INSECURITY
IN THE PAST 12 MONTHS, HAS THE LACK OF TRANSPORTATION KEPT YOU FROM MEDICAL APPOINTMENTS OR FROM GETTING MEDICATIONS?: NO

## 2025-02-09 SDOH — ECONOMIC STABILITY: TRANSPORTATION INSECURITY
IN THE PAST 12 MONTHS, HAS LACK OF TRANSPORTATION KEPT YOU FROM MEETINGS, WORK, OR FROM GETTING THINGS NEEDED FOR DAILY LIVING?: NO

## 2025-02-09 SDOH — ECONOMIC STABILITY: FOOD INSECURITY: WITHIN THE PAST 12 MONTHS, THE FOOD YOU BOUGHT JUST DIDN'T LAST AND YOU DIDN'T HAVE MONEY TO GET MORE.: NEVER TRUE

## 2025-02-09 ASSESSMENT — PATIENT HEALTH QUESTIONNAIRE - PHQ9
SUM OF ALL RESPONSES TO PHQ9 QUESTIONS 1 & 2: 0
SUM OF ALL RESPONSES TO PHQ QUESTIONS 1-9: 0
SUM OF ALL RESPONSES TO PHQ QUESTIONS 1-9: 0
1. LITTLE INTEREST OR PLEASURE IN DOING THINGS: NOT AT ALL
1. LITTLE INTEREST OR PLEASURE IN DOING THINGS: NOT AT ALL
SUM OF ALL RESPONSES TO PHQ QUESTIONS 1-9: 0
2. FEELING DOWN, DEPRESSED OR HOPELESS: NOT AT ALL
2. FEELING DOWN, DEPRESSED OR HOPELESS: NOT AT ALL
SUM OF ALL RESPONSES TO PHQ9 QUESTIONS 1 & 2: 0
SUM OF ALL RESPONSES TO PHQ QUESTIONS 1-9: 0

## 2025-02-11 ENCOUNTER — OFFICE VISIT (OUTPATIENT)
Age: 50
End: 2025-02-11
Payer: MEDICAID

## 2025-02-11 VITALS
OXYGEN SATURATION: 96 % | HEIGHT: 59 IN | DIASTOLIC BLOOD PRESSURE: 84 MMHG | TEMPERATURE: 97.5 F | BODY MASS INDEX: 38.1 KG/M2 | HEART RATE: 70 BPM | WEIGHT: 189 LBS | SYSTOLIC BLOOD PRESSURE: 128 MMHG

## 2025-02-11 DIAGNOSIS — Z11.59 NEED FOR HEPATITIS C SCREENING TEST: ICD-10-CM

## 2025-02-11 DIAGNOSIS — J44.9 STAGE 3 SEVERE COPD BY GOLD CLASSIFICATION (HCC): ICD-10-CM

## 2025-02-11 DIAGNOSIS — I10 PRIMARY HYPERTENSION: ICD-10-CM

## 2025-02-11 DIAGNOSIS — Z87.891 PERSONAL HISTORY OF TOBACCO USE, PRESENTING HAZARDS TO HEALTH: ICD-10-CM

## 2025-02-11 DIAGNOSIS — F20.89 OTHER SCHIZOPHRENIA (HCC): ICD-10-CM

## 2025-02-11 DIAGNOSIS — F20.89 OTHER SCHIZOPHRENIA (HCC): Primary | ICD-10-CM

## 2025-02-11 LAB
25(OH)D3 SERPL-MCNC: 13.3 NG/ML
ALBUMIN SERPL-MCNC: 4.4 G/DL (ref 3.5–5.2)
ALP SERPL-CCNC: 75 U/L (ref 35–104)
ALT SERPL-CCNC: 8 U/L (ref 5–33)
ANION GAP SERPL CALCULATED.3IONS-SCNC: 13 MMOL/L (ref 8–16)
AST SERPL-CCNC: 9 U/L (ref 5–32)
BACTERIA URNS QL MICRO: NORMAL /HPF
BASOPHILS # BLD: 0 K/UL (ref 0–0.2)
BASOPHILS NFR BLD: 0.5 % (ref 0–1)
BILIRUB SERPL-MCNC: <0.2 MG/DL (ref 0.2–1.2)
BILIRUB UR QL STRIP: NEGATIVE
BUN SERPL-MCNC: 7 MG/DL (ref 6–20)
CALCIUM SERPL-MCNC: 10.4 MG/DL (ref 8.6–10)
CHLORIDE SERPL-SCNC: 100 MMOL/L (ref 98–107)
CHOLEST SERPL-MCNC: 190 MG/DL (ref 0–199)
CLARITY UR: ABNORMAL
CO2 SERPL-SCNC: 28 MMOL/L (ref 22–29)
COLOR UR: YELLOW
CREAT SERPL-MCNC: 0.6 MG/DL (ref 0.5–0.9)
CREAT UR-MCNC: 65 MG/DL (ref 28–217)
CRYSTALS URNS MICRO: NORMAL /HPF
EOSINOPHIL # BLD: 0.1 K/UL (ref 0–0.6)
EOSINOPHIL NFR BLD: 0.9 % (ref 0–5)
EPI CELLS #/AREA URNS AUTO: 14 /HPF (ref 0–5)
ERYTHROCYTE [DISTWIDTH] IN BLOOD BY AUTOMATED COUNT: 15.2 % (ref 11.5–14.5)
GLUCOSE SERPL-MCNC: 85 MG/DL (ref 70–99)
GLUCOSE UR STRIP.AUTO-MCNC: NEGATIVE MG/DL
HBA1C MFR BLD: 5.7 % (ref 4–5.6)
HCT VFR BLD AUTO: 42.1 % (ref 37–47)
HCV AB SERPL QL IA: NORMAL
HDLC SERPL-MCNC: 37 MG/DL (ref 40–60)
HGB BLD-MCNC: 13.9 G/DL (ref 12–16)
HGB UR STRIP.AUTO-MCNC: NEGATIVE MG/L
HYALINE CASTS #/AREA URNS AUTO: 5 /HPF (ref 0–8)
IMM GRANULOCYTES # BLD: 0 K/UL
KETONES UR STRIP.AUTO-MCNC: ABNORMAL MG/DL
LDLC SERPL CALC-MCNC: 120 MG/DL
LEUKOCYTE ESTERASE UR QL STRIP.AUTO: ABNORMAL
LYMPHOCYTES # BLD: 2 K/UL (ref 1.1–4.5)
LYMPHOCYTES NFR BLD: 35.9 % (ref 20–40)
MCH RBC QN AUTO: 30 PG (ref 27–31)
MCHC RBC AUTO-ENTMCNC: 33 G/DL (ref 33–37)
MCV RBC AUTO: 90.7 FL (ref 81–99)
MICROALBUMIN UR-MCNC: <1.2 MG/DL (ref 0–1.99)
MICROALBUMIN/CREAT UR-RTO: NORMAL MG/G (ref 0–29)
MONOCYTES # BLD: 0.6 K/UL (ref 0–0.9)
MONOCYTES NFR BLD: 11.3 % (ref 0–10)
NEUTROPHILS # BLD: 2.9 K/UL (ref 1.5–7.5)
NEUTS SEG NFR BLD: 51 % (ref 50–65)
NITRITE UR QL STRIP.AUTO: NEGATIVE
PH UR STRIP.AUTO: 5.5 [PH] (ref 5–8)
PLATELET # BLD AUTO: 282 K/UL (ref 130–400)
PMV BLD AUTO: 10.9 FL (ref 9.4–12.3)
POTASSIUM SERPL-SCNC: 4.3 MMOL/L (ref 3.5–5.1)
PROT SERPL-MCNC: 7.7 G/DL (ref 6.4–8.3)
PROT UR STRIP.AUTO-MCNC: NEGATIVE MG/DL
RBC # BLD AUTO: 4.64 M/UL (ref 4.2–5.4)
RBC #/AREA URNS AUTO: 4 /HPF (ref 0–4)
SODIUM SERPL-SCNC: 141 MMOL/L (ref 136–145)
SP GR UR STRIP.AUTO: 1.01 (ref 1–1.03)
TRIGL SERPL-MCNC: 167 MG/DL (ref 0–149)
TSH SERPL DL<=0.005 MIU/L-ACNC: 2.26 UIU/ML (ref 0.27–4.2)
UROBILINOGEN UR STRIP.AUTO-MCNC: 0.2 E.U./DL
WBC # BLD AUTO: 5.7 K/UL (ref 4.8–10.8)
WBC #/AREA URNS AUTO: 2 /HPF (ref 0–5)

## 2025-02-11 PROCEDURE — 99214 OFFICE O/P EST MOD 30 MIN: CPT | Performed by: NURSE PRACTITIONER

## 2025-02-11 PROCEDURE — 3079F DIAST BP 80-89 MM HG: CPT | Performed by: NURSE PRACTITIONER

## 2025-02-11 PROCEDURE — 3074F SYST BP LT 130 MM HG: CPT | Performed by: NURSE PRACTITIONER

## 2025-02-11 ASSESSMENT — ENCOUNTER SYMPTOMS
RESPIRATORY NEGATIVE: 1
EYES NEGATIVE: 1
GASTROINTESTINAL NEGATIVE: 1

## 2025-02-11 NOTE — PROGRESS NOTES
Radha Medeiros (:  1975) is a 49 y.o. female, Established patient, here for evaluation of the following chief complaint(s):  COPD, Diabetes, Schizophrenia, and Health Maintenance (Defers C-Scope, will call and schedule mammogram.  Is fasting today )         Assessment & Plan  1. Routine six-month follow-up on schizophrenia, hypertension, COPD.  She is not yet eligible for insurance-covered lung screening due to her age. A comprehensive laboratory workup will be conducted today. She has been advised to schedule an appointment with a dentist at [facility], where Dr. Polk and Dr. Denton are currently accepting new Medicaid patients.    2. Tobacco use.  She has been smoking for approximately 16 to 17 years and continues to smoke more than 2 packs a day. She reports no significant issues with coughing or shortness of breath during daily activities but experiences some difficulty with tasks like climbing stairs. She has been advised to continue using her inhaler to keep her airways open, especially if she continues to smoke. She has been counseled on the risks of tobacco use, including lung cancer, and the benefits of quitting smoking. Lung screening will be recommended once she becomes eligible.    3. Medication management.  She is currently taking Depakote 500 mg twice daily, one in the morning and two at night. A lab order for Depakote level will be placed to ensure appropriate monitoring.    Follow-up  The patient will follow up in 6 months.    Results    No results found for this visit on 25.    ICD-10-CM    1. Other schizophrenia (HCC)  F20.89 Hepatitis C Antibody     CBC with Auto Differential     Lipid Panel     Hemoglobin A1C     TSH     Vitamin D 25 Hydroxy     Albumin/Creatinine Ratio, Urine     Urinalysis     CANCELED: Valproic Acid Level, Total      2. Primary hypertension  I10 Lipid Panel     Hemoglobin A1C     TSH     Albumin/Creatinine Ratio, Urine     Urinalysis     Comprehensive

## 2025-02-12 ENCOUNTER — TELEPHONE (OUTPATIENT)
Age: 50
End: 2025-02-12

## 2025-02-12 RX ORDER — ERGOCALCIFEROL 1.25 MG/1
50000 CAPSULE ORAL WEEKLY
Qty: 4 CAPSULE | Refills: 3 | Status: SHIPPED | OUTPATIENT
Start: 2025-02-12

## 2025-02-12 NOTE — TELEPHONE ENCOUNTER
Sister returned call, verified name and date of birth of patient, and gave results in detail as per provider notes below. She verbalized understanding and had no further questions.

## 2025-02-12 NOTE — TELEPHONE ENCOUNTER
----- Message from BARBARA Sierra sent at 2/12/2025  7:38 AM CST -----  Vitamin D level was too low.  Will start her on once weekly supplement.  A1c was better at 5.7%  CMP was great  Lipid panel did show worsening LDL.  I would recommend dietary changes if possible.  CBC was normal  Thyroid was normal

## 2025-02-14 LAB
VALPROATE FREE MFR SERPL: 18 % (ref 5–18)
VALPROATE FREE SERPL-MCNC: 22 UG/ML (ref 7–23)
VALPROATE SERPL-MCNC: 119 UG/ML (ref 50–125)

## 2025-04-24 ENCOUNTER — TELEPHONE (OUTPATIENT)
Age: 50
End: 2025-04-24

## 2025-04-24 NOTE — TELEPHONE ENCOUNTER
Called patient's contact (sister) to discuss that patient is due for C-Scope and mammogram.  Sister reports that they are going to defer at this time and will follow up at a later time in regards to these.

## 2025-05-20 ENCOUNTER — RESULTS FOLLOW-UP (OUTPATIENT)
Age: 50
End: 2025-05-20

## 2025-05-20 ENCOUNTER — HOSPITAL ENCOUNTER (OUTPATIENT)
Dept: WOMENS IMAGING | Age: 50
Discharge: HOME | End: 2025-05-20
Payer: MEDICAID

## 2025-05-20 DIAGNOSIS — Z12.31 ENCOUNTER FOR SCREENING MAMMOGRAM FOR BREAST CANCER: ICD-10-CM

## 2025-05-20 LAB
ALBUMIN SERPL-MCNC: 4.3 G/DL (ref 3.5–5.2)
ALP SERPL-CCNC: 77 U/L (ref 35–104)
ALT SERPL-CCNC: 9 U/L (ref 10–35)
ANION GAP SERPL CALCULATED.3IONS-SCNC: 16 MMOL/L (ref 8–16)
AST SERPL-CCNC: 13 U/L (ref 10–35)
BASOPHILS # BLD: 0 K/UL (ref 0–0.2)
BASOPHILS NFR BLD: 0.4 % (ref 0–1)
BILIRUB SERPL-MCNC: <0.2 MG/DL (ref 0.2–1.2)
BUN SERPL-MCNC: 8 MG/DL (ref 6–20)
CALCIUM SERPL-MCNC: 10.6 MG/DL (ref 8.6–10)
CHLORIDE SERPL-SCNC: 102 MMOL/L (ref 98–107)
CHOLEST SERPL-MCNC: 189 MG/DL (ref 0–199)
CO2 SERPL-SCNC: 25 MMOL/L (ref 22–29)
CREAT SERPL-MCNC: 0.6 MG/DL (ref 0.5–0.9)
EOSINOPHIL # BLD: 0.1 K/UL (ref 0–0.6)
EOSINOPHIL NFR BLD: 0.9 % (ref 0–5)
ERYTHROCYTE [DISTWIDTH] IN BLOOD BY AUTOMATED COUNT: 15.5 % (ref 11.5–14.5)
GLUCOSE SERPL-MCNC: 100 MG/DL (ref 70–99)
HBA1C MFR BLD: 5.9 % (ref 4–5.6)
HCT VFR BLD AUTO: 45.2 % (ref 37–47)
HDLC SERPL-MCNC: 36 MG/DL (ref 40–60)
HGB BLD-MCNC: 14.4 G/DL (ref 12–16)
IMM GRANULOCYTES # BLD: 0 K/UL
LDLC SERPL CALC-MCNC: 107 MG/DL
LYMPHOCYTES # BLD: 2.5 K/UL (ref 1.1–4.5)
LYMPHOCYTES NFR BLD: 36.6 % (ref 20–40)
MCH RBC QN AUTO: 29.9 PG (ref 27–31)
MCHC RBC AUTO-ENTMCNC: 31.9 G/DL (ref 33–37)
MCV RBC AUTO: 93.8 FL (ref 81–99)
MONOCYTES # BLD: 0.6 K/UL (ref 0–0.9)
MONOCYTES NFR BLD: 9.4 % (ref 0–10)
NEUTROPHILS # BLD: 3.5 K/UL (ref 1.5–7.5)
NEUTS SEG NFR BLD: 52.3 % (ref 50–65)
PLATELET # BLD AUTO: 326 K/UL (ref 130–400)
PMV BLD AUTO: 11.3 FL (ref 9.4–12.3)
POTASSIUM SERPL-SCNC: 3.9 MMOL/L (ref 3.5–5.1)
PROLACTIN SERPL-MCNC: 59.6 NG/ML (ref 4.79–23.3)
PROT SERPL-MCNC: 7.4 G/DL (ref 6.4–8.3)
RBC # BLD AUTO: 4.82 M/UL (ref 4.2–5.4)
SODIUM SERPL-SCNC: 143 MMOL/L (ref 136–145)
TRIGL SERPL-MCNC: 231 MG/DL (ref 0–149)
WBC # BLD AUTO: 6.7 K/UL (ref 4.8–10.8)

## 2025-05-20 PROCEDURE — 77063 BREAST TOMOSYNTHESIS BI: CPT

## 2025-05-25 DIAGNOSIS — J44.9 STAGE 3 SEVERE COPD BY GOLD CLASSIFICATION (HCC): ICD-10-CM

## 2025-05-27 RX ORDER — BUDESONIDE, GLYCOPYRROLATE, AND FORMOTEROL FUMARATE 160; 9; 4.8 UG/1; UG/1; UG/1
2 AEROSOL, METERED RESPIRATORY (INHALATION) 2 TIMES DAILY
Qty: 10.7 G | Refills: 2 | Status: SHIPPED | OUTPATIENT
Start: 2025-05-27

## 2025-05-27 NOTE — TELEPHONE ENCOUNTER
Radha called requesting a refill of the below medication which has been pended for you:     Requested Prescriptions     Pending Prescriptions Disp Refills    BREN[employer] WorldwidePHERE 160-9-4.8 MCG/ACT AERO [Pharmacy Med Name: BREZTRI AERO SPHERE INHALER 120 INH] 10.7 g 2     Sig: INHALE 2 PUFFS INTO THE LUNGS IN THE MORNING AND AT BEDTIME       Last Appointment Date: 2/11/2025  Next Appointment Date: Visit date not found    No Known Allergies

## 2025-06-02 NOTE — TELEPHONE ENCOUNTER
Received fax from pharmacy requesting refill on pts medication(s). Pt was last seen in office on 2/11/2025  and has a follow up scheduled for Visit date not found. Will send request to  Stephanie Bertrand  for authorization.     Requested Prescriptions     Pending Prescriptions Disp Refills    vitamin D (ERGOCALCIFEROL) 1.25 MG (48751 UT) CAPS capsule [Pharmacy Med Name: VITAMIN D2 50,000IU (ERGO) CAP RX] 4 capsule 3     Sig: TAKE 1 CAPSULE BY MOUTH 1 TIME A WEEK    metFORMIN (GLUCOPHAGE) 1000 MG tablet [Pharmacy Med Name: METFORMIN 1000MG TABLETS] 60 tablet 5     Sig: TAKE 1 TABLET BY MOUTH TWICE DAILY WITH MEALS

## 2025-06-03 RX ORDER — ERGOCALCIFEROL 1.25 MG/1
50000 CAPSULE, LIQUID FILLED ORAL WEEKLY
Qty: 4 CAPSULE | Refills: 3 | Status: SHIPPED | OUTPATIENT
Start: 2025-06-03

## 2025-08-01 DIAGNOSIS — I10 PRIMARY HYPERTENSION: ICD-10-CM

## 2025-08-01 RX ORDER — LISINOPRIL 10 MG/1
10 TABLET ORAL DAILY
Qty: 90 TABLET | Refills: 3 | Status: SHIPPED | OUTPATIENT
Start: 2025-08-01

## 2025-08-01 NOTE — TELEPHONE ENCOUNTER
Received fax from pharmacy requesting refill on pts medication(s). Pt was last seen in office on 2/11/2025  and has a follow up scheduled for Visit date not found. Will send request to  Stephanie Bretrand  for authorization.     Requested Prescriptions     Pending Prescriptions Disp Refills    lisinopril (PRINIVIL;ZESTRIL) 10 MG tablet [Pharmacy Med Name: LISINOPRIL 10MG TABLETS] 90 tablet 3     Sig: TAKE 1 TABLET BY MOUTH DAILY